# Patient Record
Sex: FEMALE | Race: BLACK OR AFRICAN AMERICAN | Employment: OTHER | URBAN - METROPOLITAN AREA
[De-identification: names, ages, dates, MRNs, and addresses within clinical notes are randomized per-mention and may not be internally consistent; named-entity substitution may affect disease eponyms.]

---

## 2017-01-04 ENCOUNTER — OFFICE VISIT (OUTPATIENT)
Dept: CARDIOLOGY CLINIC | Age: 82
End: 2017-01-04

## 2017-01-04 VITALS
SYSTOLIC BLOOD PRESSURE: 130 MMHG | OXYGEN SATURATION: 99 % | WEIGHT: 142 LBS | BODY MASS INDEX: 22.29 KG/M2 | DIASTOLIC BLOOD PRESSURE: 68 MMHG | HEIGHT: 67 IN | HEART RATE: 77 BPM

## 2017-01-04 DIAGNOSIS — R00.2 PALPITATIONS: ICD-10-CM

## 2017-01-04 DIAGNOSIS — I11.9 BENIGN HYPERTENSIVE HEART DISEASE WITHOUT HEART FAILURE: ICD-10-CM

## 2017-01-04 DIAGNOSIS — R53.83 FATIGUE, UNSPECIFIED TYPE: Primary | ICD-10-CM

## 2017-01-04 NOTE — MR AVS SNAPSHOT
Visit Information Date & Time Provider Department Dept. Phone Encounter #  
 1/4/2017  9:20 AM Jeane Naylor MD [facility] [address] [id number] Upcoming Health Maintenance Date Due DTaP/Tdap/Td series (1 - Tdap) 8/22/1949 ZOSTER VACCINE AGE 60> 8/22/1988 GLAUCOMA SCREENING Q2Y 8/22/1993 Pneumococcal 65+ Low/Medium Risk (1 of 2 - PCV13) 8/22/1993 MEDICARE YEARLY EXAM 8/22/1993 INFLUENZA AGE 9 TO ADULT 8/1/2016 Allergies as of 1/4/2017  Review Complete On: 1/4/2017 By: Jeane Naylor MD  
  
 Severity Noted Reaction Type Reactions Other Medication  04/11/2011    Other (comments) Allergy:  Most generic medication (dizziness, presyncope, equilibrium off, mental confusion/dazed feeling) Verapamil  04/11/2011    Other (comments) Symptomatic bradycardia and dizziness Zetia [Ezetimibe]  04/11/2011    Other (comments) Became weak and unable to walk, muscle aches Current Immunizations  Never Reviewed No immunizations on file. Not reviewed this visit You Were Diagnosed With   
  
 Codes Comments Palpitations    -  Primary ICD-10-CM: R00.2 ICD-9-CM: 785.1 Benign hypertensive heart disease without heart failure     ICD-10-CM: I11.9 ICD-9-CM: 402.10 Vitals BP Pulse Height(growth percentile) Weight(growth percentile) SpO2 BMI  
 130/68 77 5' 7\" (1.702 m) 142 lb (64.4 kg) 99% 22.24 kg/m2 Smoking Status Never Smoker Vitals History BMI and BSA Data Body Mass Index Body Surface Area  
 22.24 kg/m 2 1.74 m 2 Preferred Pharmacy Pharmacy Name Phone 100 [facility] [phone] Your Updated Medication List  
  
   
This list is accurate as of: 1/4/17 10:12 AM.  Always use your most recent med list.  
  
  
  
  
 ARTHROTEC 50  mg-mcg per tablet Generic drug:  diclofenac-miSOPROStol Take 1 Tab by mouth daily. aspirin 325 mg tablet Commonly known as:  ASPIRIN Take 325 mg by mouth daily. calcium-vitamin D 500 mg(1,250mg) -200 unit per tablet Commonly known as:  OYSTER SHELL Take 1 Tab by mouth two (2) times daily (with meals). metFORMIN 500 mg tablet Commonly known as:  GLUCOPHAGE Take 500 mg by mouth two (2) times daily (with meals). niacin  mg Tb24 Commonly known as:  Jammie Hashimoto Take  by mouth nightly. PROCARDIA XL 60 mg ER tablet Generic drug:  NIFEdipine ER  
TAKE 1 TABLET DAILY  
  
 triamterene-hydroCHLOROthiazide 37.5-25 mg per capsule Commonly known as:  [place] Shraddha[place] TAKE 1 CAPSULE DAILY Introducing [facility]! Marcie Huber introduces [employer] patient portal. Now you can access parts of your medical record, email your doctor's office, and request medication refills online. 1. In your internet browser, go to [email or web address] 2. Click on the First Time User? Click Here link in the Sign In box. You will see the New Member Sign Up page. 3. Enter your [employer] Access Code exactly as it appears below. You will not need to use this code after youve completed the sign-up process. If you do not sign up before the expiration date, you must request a new code. · [employer] Access Code: [email or web address] Expires: 1/29/2017  9:01 AM 
 
4. Enter the last four digits of your Social Security Number (xxxx) and Date of Birth (mm/dd/yyyy) as indicated and click Submit. You will be taken to the next sign-up page. 5. Create a [employer] ID. This will be your [employer] login ID and cannot be changed, so think of one that is secure and easy to remember. 6. Create a [employer] password. You can change your password at any time. 7. Enter your Password Reset Question and Answer. This can be used at a later time if you forget your password. 8. Enter your e-mail address.  You will receive e-mail notification when new information is available in [employer]. 9. Click Sign Up. You can now view and download portions of your medical record. 10. Click the Download Summary menu link to download a portable copy of your medical information. If you have questions, please visit the Frequently Asked Questions section of the [employer] website. Remember, [employer] is NOT to be used for urgent needs. For medical emergencies, dial 911. Now available from your iPhone and Android! Please provide this summary of care documentation to your next provider. Your primary care clinician is listed as Nilda Sosa. If you have any questions after today's visit, please call [phone].

## 2017-01-04 NOTE — PROGRESS NOTES
HISTORY OF PRESENT ILLNESS  Vannessa Douglas is a 80 y.o. female. HPI  She is complaining of fatigue. She has had this for a number of years. She denies chest pain, dyspnea, orthopnea or PND. She has had no palpitations, dizziness or syncope. She has had no symptoms to indicate TIA or amaurosis fugax. Her activity level has been above average. She saw my nurse practitioner at the end of October of 2016 with palpitations. At that time, she underwent a Holter monitor, which demonstrated sinus rhythm with occasional PVCs and also PACs. She had nine runs of supraventricular tachycardia  maximum eight beats in duration. She has since had no recurrent episodes of palpitations. This patient has a history of palpitations and hypertension. She was treated with verapamil in the past, which had to be discontinued because of symptomatic bradycardia with dizziness. Her Holter monitor revealed bradycardia with a heart rate as low as the upper 30s which has improved since verapamil has been discontinued. She has since been on Procardia with good blood pressure control. She has had no anginal chest pain and has a history of a negative stress test in the past. She had a great deal of myalgia from Zocor and finally stopped taking Zocor, with improvement. Review of Systems   Constitutional: Positive for malaise/fatigue. Negative for weight loss. HENT: Negative for hearing loss. Eyes: Negative for blurred vision and double vision. Respiratory: Negative for shortness of breath. Cardiovascular: Positive for palpitations. Negative for chest pain, orthopnea, claudication, leg swelling and PND. Gastrointestinal: Negative for blood in stool, heartburn and melena. Genitourinary: Negative for dysuria, frequency, hematuria and urgency. Musculoskeletal: Negative for back pain and joint pain. Skin: Negative for itching and rash.    Neurological: Negative for dizziness, loss of consciousness, weakness and headaches. Psychiatric/Behavioral: Negative for depression and memory loss. Physical Exam   Constitutional: She is oriented to person, place, and time. She appears well-developed and well-nourished. HENT:   Head: Normocephalic and atraumatic. Eyes: Conjunctivae are normal. Pupils are equal, round, and reactive to light. Neck: Normal range of motion. Neck supple. No JVD present. Cardiovascular: Normal rate, regular rhythm, S1 normal and S2 normal.   No extrasystoles are present. PMI is not displaced. Exam reveals no gallop and no friction rub. No murmur heard. Pulses:       Carotid pulses are 3+ on the right side, and 3+ on the left side. Pulmonary/Chest: Effort normal. She has no rales. Abdominal: Soft. There is no tenderness. Musculoskeletal: She exhibits no edema. Neurological: She is alert and oriented to person, place, and time. No cranial nerve deficit. Skin: Skin is warm and dry. Psychiatric: She has a normal mood and affect. Her behavior is normal.     Visit Vitals    /68    Pulse 77    Ht 5' 7\" (1.702 m)    Wt 64.4 kg (142 lb)    SpO2 99%    BMI 22.24 kg/m2       Past Medical History   Diagnosis Date    Cardiac echocardiogram 02/13/2007     [facility]:  EF 65-70%. No WMA. Mild LVH. No significant valve abnormalities.  Cardiac Holter monitor 11/10/2016     Sinus rhythm, avg 71 bpm (range 57-96 bpm). Occasional SVEs (mainly single, w/paired and 9 runs of SVT, max 8 beats).  History of bradycardia      with verapamil    Hypercholesteremia     Hypertension     Palpitations        Social History     Social History    Marital status:      Spouse name: N/A    Number of children: N/A    Years of education: N/A     Occupational History    Not on file.      Social History Main Topics    Smoking status: Never Smoker    Smokeless tobacco: Never Used    Alcohol use Yes      Comment: drinks alcohol in moderation    Drug use: No    Sexual activity: Not on file     Other Topics Concern    Not on file     Social History Narrative       Family History   Problem Relation Age of Onset    Hypertension Mother     Stroke Mother     Heart Disease Father        Past Surgical History   Procedure Laterality Date    Hx cataract removal  2001     bilateral       Current Outpatient Prescriptions   Medication Sig Dispense Refill    triamterene-hydrochlorothiazide (DYAZIDE) 37.5-25 mg per capsule TAKE 1 CAPSULE DAILY 90 Cap 3    PROCARDIA XL 60 mg ER tablet TAKE 1 TABLET DAILY 90 Tab 3    niacin ER (NIASPAN) 750 mg Tb24 Take  by mouth nightly.  calcium-vitamin D (OYSTER SHELL) 500 mg(1,250mg) -200 unit per tablet Take 1 Tab by mouth two (2) times daily (with meals).  metformin (GLUCOPHAGE) 500 mg tablet Take 500 mg by mouth two (2) times daily (with meals).  diclofenac-misoprostol (ARTHROTEC 50)  mg-mcg per tablet Take 1 Tab by mouth daily.  aspirin (ASPIRIN) 325 mg tablet Take 325 mg by mouth daily. EKG: unchanged from previous tracings, normal sinus rhythm, nonspecific ST and T waves changes. ASSESSMENT and PLAN  Encounter Diagnoses   Name Primary?  Fatigue, unspecified type Yes    Palpitations     Hypertension    Overall, she looks good for her age. Despite her fatigue, her activity level seems to be way above average for her age of [de-identified]. She shows no signs of cardiac decompensation. Her blood pressure has been under control. She has had no exertional chest discomfort or shortness of breath. She was advised that her fatigue may well be related to her advancing age yet her activity level seems to be way above average for her age. We discussed the atypical presentation of angina and we could consider a stress test, although, clinically, I doubt we are dealing with ischemic heart disease.   The patient would like to wait a while before proceeding with the stress test and I concurred with her with no reservation.

## 2017-01-04 NOTE — PROGRESS NOTES
1. Have you been to the ER, urgent care clinic since your last visit? Hospitalized since your last visit? No  2. Have you seen or consulted any other health care providers outside of the [address] [place] [address] since your last visit? Include any pap smears or colon screening.  No

## 2017-01-25 ENCOUNTER — HOSPITAL ENCOUNTER (OUTPATIENT)
Dept: LAB | Age: 82
Discharge: HOME | End: 2017-01-25
Payer: MEDICARE

## 2017-01-25 DIAGNOSIS — I10 ESSENTIAL HYPERTENSION, MALIGNANT: ICD-10-CM

## 2017-01-25 DIAGNOSIS — M13.0 UNSPECIFIED POLYARTHROPATHY OR POLYARTHRITIS, SITE UNSPECIFIED: ICD-10-CM

## 2017-01-25 LAB
ALBUMIN SERPL BCP-MCNC: 4.2 G/DL (ref 3.4–5)
ALBUMIN/GLOB SERPL: 1.2 {RATIO} (ref 0.8–1.7)
ALP SERPL-CCNC: 81 U/L (ref 45–117)
ALT SERPL-CCNC: 20 U/L (ref 13–56)
ANION GAP BLD CALC-SCNC: 10 MMOL/L (ref 3–18)
APPEARANCE UR: CLEAR
AST SERPL W P-5'-P-CCNC: 16 U/L (ref 15–37)
BACTERIA URNS QL MICRO: ABNORMAL /HPF
BASOPHILS # BLD AUTO: 0.1 K/UL (ref 0–0.1)
BASOPHILS # BLD: 2 % (ref 0–2)
BILIRUB SERPL-MCNC: 0.2 MG/DL (ref 0.2–1)
BILIRUB UR QL: NEGATIVE
BUN SERPL-MCNC: 19 MG/DL (ref 7–18)
BUN/CREAT SERPL: 17 (ref 12–20)
CALCIUM SERPL-MCNC: 9.4 MG/DL (ref 8.5–10.1)
CHLORIDE SERPL-SCNC: 103 MMOL/L (ref 100–108)
CK SERPL-CCNC: 146 U/L (ref 26–192)
CO2 SERPL-SCNC: 26 MMOL/L (ref 21–32)
COLOR UR: YELLOW
CREAT SERPL-MCNC: 1.13 MG/DL (ref 0.6–1.3)
CRP SERPL-MCNC: <0.3 MG/DL (ref 0–0.3)
DIFFERENTIAL METHOD BLD: ABNORMAL
EOSINOPHIL # BLD: 0.1 K/UL (ref 0–0.4)
EOSINOPHIL NFR BLD: 2 % (ref 0–5)
EPITH CASTS URNS QL MICRO: ABNORMAL /LPF (ref 0–5)
ERYTHROCYTE [DISTWIDTH] IN BLOOD BY AUTOMATED COUNT: 18.4 % (ref 11.6–14.5)
ERYTHROCYTE [SEDIMENTATION RATE] IN BLOOD: 33 MM/HR (ref 0–30)
GLOBULIN SER CALC-MCNC: 3.6 G/DL (ref 2–4)
GLUCOSE SERPL-MCNC: 117 MG/DL (ref 74–99)
GLUCOSE UR STRIP.AUTO-MCNC: NEGATIVE MG/DL
HCT VFR BLD AUTO: 32.5 % (ref 35–45)
HGB BLD-MCNC: 10.7 G/DL (ref 12–16)
HGB UR QL STRIP: NEGATIVE
KETONES UR QL STRIP.AUTO: NEGATIVE MG/DL
LEUKOCYTE ESTERASE UR QL STRIP.AUTO: ABNORMAL
LYMPHOCYTES # BLD AUTO: 43 % (ref 21–52)
LYMPHOCYTES # BLD: 2.7 K/UL (ref 0.9–3.6)
MCH RBC QN AUTO: 28.8 PG (ref 24–34)
MCHC RBC AUTO-ENTMCNC: 32.9 G/DL (ref 31–37)
MCV RBC AUTO: 87.4 FL (ref 74–97)
MONOCYTES # BLD: 0.5 K/UL (ref 0.05–1.2)
MONOCYTES NFR BLD AUTO: 8 % (ref 3–10)
NEUTS SEG # BLD: 2.9 K/UL (ref 1.8–8)
NEUTS SEG NFR BLD AUTO: 45 % (ref 40–73)
NITRITE UR QL STRIP.AUTO: NEGATIVE
PH UR STRIP: 6 [PH] (ref 5–8)
PLATELET # BLD AUTO: 359 K/UL (ref 135–420)
PMV BLD AUTO: 10.3 FL (ref 9.2–11.8)
POTASSIUM SERPL-SCNC: 4.5 MMOL/L (ref 3.5–5.5)
PROT SERPL-MCNC: 7.8 G/DL (ref 6.4–8.2)
PROT UR STRIP-MCNC: NEGATIVE MG/DL
RBC # BLD AUTO: 3.72 M/UL (ref 4.2–5.3)
RBC #/AREA URNS HPF: ABNORMAL /HPF (ref 0–5)
SODIUM SERPL-SCNC: 139 MMOL/L (ref 136–145)
SP GR UR REFRACTOMETRY: 1.02 (ref 1–1.03)
UROBILINOGEN UR QL STRIP.AUTO: 0.2 EU/DL (ref 0.2–1)
WBC # BLD AUTO: 6.3 K/UL (ref 4.6–13.2)
WBC URNS QL MICRO: ABNORMAL /HPF (ref 0–4)

## 2017-01-25 PROCEDURE — 85652 RBC SED RATE AUTOMATED: CPT | Performed by: SPECIALIST

## 2017-01-25 PROCEDURE — 80053 COMPREHEN METABOLIC PANEL: CPT | Performed by: SPECIALIST

## 2017-01-25 PROCEDURE — 81001 URINALYSIS AUTO W/SCOPE: CPT | Performed by: SPECIALIST

## 2017-01-25 PROCEDURE — 86140 C-REACTIVE PROTEIN: CPT | Performed by: SPECIALIST

## 2017-01-25 PROCEDURE — 36415 COLL VENOUS BLD VENIPUNCTURE: CPT | Performed by: SPECIALIST

## 2017-01-25 PROCEDURE — 82550 ASSAY OF CK (CPK): CPT | Performed by: SPECIALIST

## 2017-01-25 PROCEDURE — 85025 COMPLETE CBC W/AUTO DIFF WBC: CPT | Performed by: SPECIALIST

## 2017-02-06 RX ORDER — NIFEDIPINE 60 MG
TABLET, EXTENDED RELEASE 24 HR ORAL
Qty: 90 TAB | Refills: 2 | Status: SHIPPED | OUTPATIENT
Start: 2017-02-06 | End: 2017-11-07 | Stop reason: SDUPTHER

## 2017-03-02 RX ORDER — TRIAMTERENE AND HYDROCHLOROTHIAZIDE 37.5; 25 MG/1; MG/1
CAPSULE ORAL
Qty: 90 CAP | Refills: 3 | Status: SHIPPED | OUTPATIENT
Start: 2017-03-02 | End: 2018-02-27 | Stop reason: SDUPTHER

## 2017-07-11 ENCOUNTER — OFFICE VISIT (OUTPATIENT)
Dept: CARDIOLOGY CLINIC | Age: 82
End: 2017-07-11

## 2017-07-11 VITALS
HEIGHT: 67 IN | HEART RATE: 69 BPM | OXYGEN SATURATION: 98 % | DIASTOLIC BLOOD PRESSURE: 60 MMHG | WEIGHT: 142 LBS | SYSTOLIC BLOOD PRESSURE: 140 MMHG | BODY MASS INDEX: 22.29 KG/M2

## 2017-07-11 DIAGNOSIS — R00.2 PALPITATIONS: ICD-10-CM

## 2017-07-11 DIAGNOSIS — R53.83 FATIGUE, UNSPECIFIED TYPE: Primary | ICD-10-CM

## 2017-07-11 DIAGNOSIS — I11.9 BENIGN HYPERTENSIVE HEART DISEASE WITHOUT HEART FAILURE: ICD-10-CM

## 2017-07-11 NOTE — PROGRESS NOTES
1. Have you been to the ER, urgent care clinic since your last visit? Hospitalized since your last visit? No  2. Have you seen or consulted any other health care providers outside of the [facility] since your last visit? Include any pap smears or colon screening.  no

## 2017-07-11 NOTE — PATIENT INSTRUCTIONS
Continue current medications. No changes.    If you have any further questions or concerns, please contact our office. [phone]

## 2017-07-11 NOTE — PROGRESS NOTES
HISTORY OF PRESENT ILLNESS  Connor Walker is a 80 y.o. female. HPI  She is still complaining of easy fatigability. However, she seems to be reasonably active for her age. She is able to do a lot of household chores at home. She denies any cardiac symptoms. She denies chest pain, dyspnea, orthopnea or PND. She denies palpitations, dizziness or syncope. She used to have palpitations, but she is no longer experiencing palpitations. She denies any symptoms to indicate TIA or amaurosis fugax. She has a new family doctor and she apparently discontinued the metformin because of adequate hemoglobin A1Cs. This patient has a history of palpitations and hypertension. She was treated with verapamil in the past, which had to be discontinued because of symptomatic bradycardia with dizziness. Her Holter monitor revealed bradycardia with a heart rate as low as the upper 30s which has improved since verapamil has been discontinued. She has since been on Procardia with good blood pressure control. She has had no anginal chest pain and has a history of a negative stress test in the past. She had a great deal of myalgia from Zocor and finally stopped taking Zocor, with improvement. She saw my nurse practitioner at the end of October of 2016 with palpitations. At that time, she underwent a Holter monitor, which demonstrated sinus rhythm with occasional PVCs and also PACs. She had nine runs of supraventricular tachycardia  maximum eight beats in duration. She has since had no recurrent episodes of palpitations. Review of Systems   Constitutional: Positive for malaise/fatigue. Negative for weight loss. HENT: Negative for hearing loss. Eyes: Negative for blurred vision and double vision. Respiratory: Negative for shortness of breath. Cardiovascular: Negative for chest pain, palpitations, orthopnea, claudication, leg swelling and PND.    Gastrointestinal: Negative for blood in stool, heartburn and melena. Genitourinary: Negative for dysuria, frequency, hematuria and urgency. Musculoskeletal: Negative for back pain and joint pain. Skin: Negative for itching and rash. Neurological: Negative for dizziness, loss of consciousness, weakness and headaches. Psychiatric/Behavioral: Negative for depression and memory loss. Physical Exam   Constitutional: She is oriented to person, place, and time. She appears well-developed and well-nourished. HENT:   Head: Normocephalic and atraumatic. Eyes: Conjunctivae are normal. Pupils are equal, round, and reactive to light. Neck: Normal range of motion. Neck supple. No JVD present. Cardiovascular: Normal rate, regular rhythm, S1 normal and S2 normal.   No extrasystoles are present. PMI is not displaced. Exam reveals no gallop and no friction rub. No murmur heard. Pulses:       Carotid pulses are 3+ on the right side, and 3+ on the left side. Pulmonary/Chest: Effort normal. She has no rales. Abdominal: Soft. There is no tenderness. Musculoskeletal: She exhibits no edema. Neurological: She is alert and oriented to person, place, and time. No cranial nerve deficit. Skin: Skin is warm and dry. Psychiatric: She has a normal mood and affect. Her behavior is normal.     Visit Vitals    /60 (BP 1 Location: Left arm, BP Patient Position: Sitting)    Pulse 69    Ht 5' 7\" (1.702 m)    Wt 64.4 kg (142 lb)    SpO2 98%    BMI 22.24 kg/m2       Past Medical History:   Diagnosis Date    Cardiac echocardiogram 02/13/2007    [facility]:  EF 65-70%. No WMA. Mild LVH. No significant valve abnormalities.  Cardiac Holter monitor 11/10/2016    Sinus rhythm, avg 71 bpm (range 57-96 bpm). Occasional SVEs (mainly single, w/paired and 9 runs of SVT, max 8 beats).       History of bradycardia     with verapamil    Hypercholesteremia     Hypertension     Palpitations        Social History     Social History    Marital status:  Spouse name: N/A    Number of children: N/A    Years of education: N/A     Occupational History    Not on file. Social History Main Topics    Smoking status: Never Smoker    Smokeless tobacco: Never Used    Alcohol use Yes      Comment: drinks alcohol in moderation    Drug use: No    Sexual activity: Not on file     Other Topics Concern    Not on file     Social History Narrative       Family History   Problem Relation Age of Onset    Hypertension Mother     Stroke Mother     Heart Disease Father        Past Surgical History:   Procedure Laterality Date    HX CATARACT REMOVAL  2001    bilateral       Current Outpatient Prescriptions   Medication Sig Dispense Refill    triamterene-hydroCHLOROthiazide (DYAZIDE) 37.5-25 mg per capsule TAKE 1 CAPSULE DAILY 90 Cap 3    PROCARDIA XL 60 mg ER tablet TAKE 1 TABLET DAILY 90 Tab 2    niacin ER (NIASPAN) 750 mg Tb24 Take  by mouth nightly.  calcium-vitamin D (OYSTER SHELL) 500 mg(1,250mg) -200 unit per tablet Take 1 Tab by mouth two (2) times daily (with meals).  diclofenac-misoprostol (ARTHROTEC 50)  mg-mcg per tablet Take 1 Tab by mouth daily.  aspirin (ASPIRIN) 325 mg tablet Take 325 mg by mouth daily. EKG: unchanged from previous tracings, normal sinus rhythm, nonspecific ST and T waves changes, sinus arrhythmia vs PAC's  . ASSESSMENT and PLAN  Encounter Diagnoses   Name Primary?  Fatigue, unspecified type Yes    Palpitations     Hypertension    She has been doing quite well. She continues with fatigue; however, her activity level appears to be way above average for her age of [de-identified]. She has no cardiac findings to account for her fatigue. Her blood pressure has been under control. She is no longer experiencing palpitations. She did have some PVCs and PACs and short runs of supraventricular tachycardia in October of 2016. The metformin has been discontinued because of good hemoglobin A1Cs.   Her glucose control remains to be seen. I am in favor of continuing the metformin at this time.

## 2017-07-11 NOTE — MR AVS SNAPSHOT
Visit Information Date & Time Provider Department Dept. Phone Encounter #  
 7/11/2017 10:20 AM Nessa Hicks MD [facility] [phone] [id number] Upcoming Health Maintenance Date Due DTaP/Tdap/Td series (1 - Tdap) 8/22/1949 ZOSTER VACCINE AGE 60> 8/22/1988 GLAUCOMA SCREENING Q2Y 8/22/1993 Pneumococcal 65+ Low/Medium Risk (1 of 2 - PCV13) 8/22/1993 MEDICARE YEARLY EXAM 8/22/1993 INFLUENZA AGE 9 TO ADULT 8/1/2017 Allergies as of 7/11/2017  Review Complete On: 7/11/2017 By: Nessa Hicks MD  
  
 Severity Noted Reaction Type Reactions Other Medication  04/11/2011    Other (comments) Allergy:  Most generic medication (dizziness, presyncope, equilibrium off, mental confusion/dazed feeling) Verapamil  04/11/2011    Other (comments) Symptomatic bradycardia and dizziness Zetia [Ezetimibe]  04/11/2011    Other (comments) Became weak and unable to walk, muscle aches Current Immunizations  Never Reviewed No immunizations on file. Not reviewed this visit You Were Diagnosed With   
  
 Codes Comments Fatigue, unspecified type    -  Primary ICD-10-CM: R53.83 ICD-9-CM: 780.79 Palpitations     ICD-10-CM: R00.2 ICD-9-CM: 785.1 Benign hypertensive heart disease without heart failure     ICD-10-CM: I11.9 ICD-9-CM: 402.10 Vitals BP Pulse Height(growth percentile) Weight(growth percentile) SpO2 BMI  
 140/60 (BP 1 Location: Left arm, BP Patient Position: Sitting) 69 5' 7\" (1.702 m) 142 lb (64.4 kg) 98% 22.24 kg/m2 Smoking Status Never Smoker Vitals History BMI and BSA Data Body Mass Index Body Surface Area  
 22.24 kg/m 2 1.74 m 2 Preferred Pharmacy Pharmacy Name Phone 100 [facility] [phone] Your Updated Medication List  
  
   
 This list is accurate as of: 7/11/17 11:00 AM.  Always use your most recent med list.  
  
  
  
  
 ARTHROTEC 50  mg-mcg per tablet Generic drug:  diclofenac-miSOPROStol Take 1 Tab by mouth daily. aspirin 325 mg tablet Commonly known as:  ASPIRIN Take 325 mg by mouth daily. calcium-vitamin D 500 mg(1,250mg) -200 unit per tablet Commonly known as:  OYSTER SHELL Take 1 Tab by mouth two (2) times daily (with meals). niacin  mg Tb24 Commonly known as:  Llana Doris Take  by mouth nightly. PROCARDIA XL 60 mg ER tablet Generic drug:  NIFEdipine ER  
TAKE 1 TABLET DAILY  
  
 triamterene-hydroCHLOROthiazide 37.5-25 mg per capsule Commonly known as:  Phelan Shoemaker TAKE 1 CAPSULE DAILY We Performed the Following AMB POC EKG ROUTINE W/ 12 LEADS, INTER & REP [78540 CPT(R)] Please provide this summary of care documentation to your next provider. Your primary care clinician is listed as Meme Medellin. If you have any questions after today's visit, please call [phone].

## 2017-11-07 RX ORDER — NIFEDIPINE 60 MG
60 TABLET, EXTENDED RELEASE 24 HR ORAL DAILY
Qty: 90 TAB | Refills: 3 | Status: SHIPPED | OUTPATIENT
Start: 2017-11-07 | End: 2018-11-08 | Stop reason: SDUPTHER

## 2018-01-15 ENCOUNTER — OFFICE VISIT (OUTPATIENT)
Dept: CARDIOLOGY CLINIC | Age: 83
End: 2018-01-15

## 2018-01-15 VITALS
SYSTOLIC BLOOD PRESSURE: 138 MMHG | HEIGHT: 67 IN | WEIGHT: 150 LBS | BODY MASS INDEX: 23.54 KG/M2 | HEART RATE: 59 BPM | DIASTOLIC BLOOD PRESSURE: 70 MMHG | OXYGEN SATURATION: 94 %

## 2018-01-15 DIAGNOSIS — I11.9 BENIGN HYPERTENSIVE HEART DISEASE WITHOUT HEART FAILURE: ICD-10-CM

## 2018-01-15 DIAGNOSIS — R00.2 PALPITATIONS: Primary | ICD-10-CM

## 2018-01-15 DIAGNOSIS — Z78.9 INTOLERANCE OF DRUG: ICD-10-CM

## 2018-01-15 RX ORDER — NIACIN 750 MG/1
TABLET, EXTENDED RELEASE ORAL
COMMUNITY
End: 2019-01-28 | Stop reason: ALTCHOICE

## 2018-01-15 RX ORDER — METFORMIN HYDROCHLORIDE 500 MG/1
TABLET ORAL 2 TIMES DAILY WITH MEALS
COMMUNITY
End: 2018-01-15

## 2018-01-15 NOTE — PROGRESS NOTES
1. Have you been to the ER, urgent care clinic since your last visit? Hospitalized since your last visit? No    2. Have you seen or consulted any other health care providers outside of the [facility] since your last visit? Include any pap smears or colon screening.  no

## 2018-01-15 NOTE — MR AVS SNAPSHOT
Visit Information Date & Time Provider Department Dept. Phone Encounter #  
 1/15/2018  9:40 AM Sravan Davis MD [facility] [phone] [id number] Upcoming Health Maintenance Date Due DTaP/Tdap/Td series (1 - Tdap) 8/22/1949 ZOSTER VACCINE AGE 60> 6/22/1988 GLAUCOMA SCREENING Q2Y 8/22/1993 Pneumococcal 65+ Low/Medium Risk (1 of 2 - PCV13) 8/22/1993 MEDICARE YEARLY EXAM 8/22/1993 Influenza Age 5 to Adult 8/1/2017 Allergies as of 1/15/2018  Review Complete On: 1/15/2018 By: Sravan Davis MD  
  
 Severity Noted Reaction Type Reactions Other Medication  04/11/2011    Other (comments) Allergy:  Most generic medication (dizziness, presyncope, equilibrium off, mental confusion/dazed feeling) Verapamil  04/11/2011    Other (comments) Symptomatic bradycardia and dizziness Zetia [Ezetimibe]  04/11/2011    Other (comments) Became weak and unable to walk, muscle aches Current Immunizations  Never Reviewed No immunizations on file. Not reviewed this visit You Were Diagnosed With   
  
 Codes Comments Other fatigue    -  Primary ICD-10-CM: R53.83 ICD-9-CM: 780.79 Palpitations     ICD-10-CM: R00.2 ICD-9-CM: 785.1 Benign hypertensive heart disease without heart failure     ICD-10-CM: I11.9 ICD-9-CM: 402.10 Intolerance of drug     ICD-10-CM: Z78.9 ICD-9-CM: 995.27 Vitals BP Pulse Height(growth percentile) Weight(growth percentile) SpO2 BMI  
 138/70 (!) 59 5' 7\" (1.702 m) 150 lb (68 kg) 94% 23.49 kg/m2 Smoking Status Never Smoker Vitals History BMI and BSA Data Body Mass Index Body Surface Area  
 23.49 kg/m 2 1.79 m 2 Preferred Pharmacy Pharmacy Name Phone 100 [facility] [phone] Your Updated Medication List  
  
   
 This list is accurate as of: 1/15/18 10:33 AM.  Always use your most recent med list.  
  
  
  
  
 aspirin 325 mg tablet Commonly known as:  ASPIRIN Take 325 mg by mouth daily. niacin  mg Tb24 Commonly known as:  Sinda Poll Take  by mouth nightly. PROCARDIA XL 60 mg ER tablet Generic drug:  NIFEdipine ER Take 1 Tab by mouth daily. triamterene-hydroCHLOROthiazide 37.5-25 mg per capsule Commonly known as:  Kala Stephanie TAKE 1 CAPSULE DAILY We Performed the Following AMB POC EKG ROUTINE W/ 12 LEADS, INTER & REP [56717 CPT(R)] Please provide this summary of care documentation to your next provider. Your primary care clinician is listed as Chalino Hsu. If you have any questions after today's visit, please call [phone].

## 2018-01-15 NOTE — PROGRESS NOTES
HISTORY OF PRESENT ILLNESS  Connor Ureña is a 80 y.o. female. HPI  She has been feeling well. She denies any cardiac symptoms. She has had no chest pain, dyspnea, orthopnea or PND. She denies palpitations, dizziness or syncope. She has had no symptoms to indicate TIA or amaurosis fugax. Her blood pressure has been under control. She has been off metformin altogether as her diabetes has been under control. This patient has a history of palpitations and hypertension. She was treated with verapamil in the past, which had to be discontinued because of symptomatic bradycardia with dizziness. Her Holter monitor revealed bradycardia with a heart rate as low as the upper 30s which has improved since verapamil has been discontinued. She has since been on Procardia with good blood pressure control. She has had no anginal chest pain and has a history of a negative stress test in the past. She had a great deal of myalgia from Zocor and finally stopped taking Zocor, with improvement. She saw my nurse practitioner at the end of October of 2016 with palpitations.  At that time, she underwent a Holter monitor, which demonstrated sinus rhythm with occasional PVCs and also PACs.  She had nine runs of supraventricular tachycardia  maximum eight beats in duration.  She has since had no recurrent episodes of palpitations. Review of Systems   Constitutional: Negative for malaise/fatigue and weight loss. HENT: Negative for hearing loss. Eyes: Negative for blurred vision and double vision. Respiratory: Negative for shortness of breath. Cardiovascular: Negative for chest pain, palpitations, orthopnea, claudication, leg swelling and PND. Gastrointestinal: Negative for blood in stool, heartburn and melena. Genitourinary: Negative for dysuria, frequency, hematuria and urgency. Musculoskeletal: Negative for back pain and joint pain. Skin: Negative for itching and rash.    Neurological: Negative for dizziness, loss of consciousness and weakness. Psychiatric/Behavioral: Negative for depression and memory loss. Physical Exam   Constitutional: She is oriented to person, place, and time. She appears well-developed and well-nourished. HENT:   Head: Normocephalic and atraumatic. Eyes: Conjunctivae are normal. Pupils are equal, round, and reactive to light. Neck: Normal range of motion. Neck supple. No JVD present. Cardiovascular: Normal rate, regular rhythm, S1 normal and S2 normal.   No extrasystoles are present. PMI is not displaced. Exam reveals no gallop and no friction rub. No murmur heard. Pulses:       Carotid pulses are 3+ on the right side, and 3+ on the left side. Pulmonary/Chest: Effort normal. She has no rales. Abdominal: Soft. There is no tenderness. Musculoskeletal: She exhibits no edema. Neurological: She is alert and oriented to person, place, and time. No cranial nerve deficit. Skin: Skin is warm and dry. Psychiatric: She has a normal mood and affect. Her behavior is normal.     Visit Vitals    /70    Pulse (!) 59    Ht 5' 7\" (1.702 m)    Wt 68 kg (150 lb)    SpO2 94%    BMI 23.49 kg/m2       Past Medical History:   Diagnosis Date    Cardiac echocardiogram 02/13/2007    [facility]:  EF 65-70%. No WMA. Mild LVH. No significant valve abnormalities.  Cardiac Holter monitor 11/10/2016    Sinus rhythm, avg 71 bpm (range 57-96 bpm). Occasional SVEs (mainly single, w/paired and 9 runs of SVT, max 8 beats).  History of bradycardia     with verapamil    Hypercholesteremia     Hypertension     Palpitations        Social History     Social History    Marital status:      Spouse name: N/A    Number of children: N/A    Years of education: N/A     Occupational History    Not on file.      Social History Main Topics    Smoking status: Never Smoker    Smokeless tobacco: Never Used    Alcohol use Yes      Comment: drinks alcohol in moderation  Drug use: No    Sexual activity: Not on file     Other Topics Concern    Not on file     Social History Narrative       Family History   Problem Relation Age of Onset    Hypertension Mother     Stroke Mother     Heart Disease Father        Past Surgical History:   Procedure Laterality Date    HX CATARACT REMOVAL  2001    bilateral       Current Outpatient Prescriptions   Medication Sig Dispense Refill    niacin ER (NIASPAN) 750 mg Tb24 Take  by mouth nightly.  PROCARDIA XL 60 mg ER tablet Take 1 Tab by mouth daily. 90 Tab 3    triamterene-hydroCHLOROthiazide (DYAZIDE) 37.5-25 mg per capsule TAKE 1 CAPSULE DAILY 90 Cap 3    aspirin (ASPIRIN) 325 mg tablet Take 325 mg by mouth daily. EKG: unchanged from previous tracings, normal sinus rhythm, nonspecific ST and T waves changes, sinus arrhythmia  . ASSESSMENT and PLAN  Encounter Diagnoses   Name Primary?  Palpitations Yes    Hypertension     Intolerance to statin because of myalgia. She has been doing very well. She is no longer experiencing palpitations. She has had no symptoms to indicate angina or cardiac decompensation. Her blood pressure has been under control. She looks good for her age of 80. At this point, I would not pursue further cardiac diagnostic workup.

## 2018-02-27 RX ORDER — TRIAMTERENE AND HYDROCHLOROTHIAZIDE 37.5; 25 MG/1; MG/1
CAPSULE ORAL
Qty: 90 CAP | Refills: 3 | Status: SHIPPED | OUTPATIENT
Start: 2018-02-27 | End: 2019-02-22 | Stop reason: SDUPTHER

## 2018-07-24 ENCOUNTER — OFFICE VISIT (OUTPATIENT)
Dept: CARDIOLOGY CLINIC | Age: 83
End: 2018-07-24

## 2018-07-24 VITALS
HEART RATE: 75 BPM | OXYGEN SATURATION: 98 % | WEIGHT: 147 LBS | HEIGHT: 67 IN | SYSTOLIC BLOOD PRESSURE: 112 MMHG | BODY MASS INDEX: 23.07 KG/M2 | DIASTOLIC BLOOD PRESSURE: 60 MMHG

## 2018-07-24 DIAGNOSIS — R00.2 PALPITATIONS: Primary | ICD-10-CM

## 2018-07-24 DIAGNOSIS — Z78.9 INTOLERANCE OF DRUG: ICD-10-CM

## 2018-07-24 DIAGNOSIS — I11.9 BENIGN HYPERTENSIVE HEART DISEASE WITHOUT HEART FAILURE: ICD-10-CM

## 2018-07-24 NOTE — MR AVS SNAPSHOT
[place] [id number] [place] [id number] [phone] Patient: Jareth Navarrete MRN: [id number] IPP:[id number] Visit Information Date & Time Provider Department Dept. Phone Encounter #  
 7/24/2018  9:20 AM Ezekiel Benz MD [facility] [id number] Follow-up Instructions Follow-up and Disposition History Upcoming Health Maintenance Date Due DTaP/Tdap/Td series (1 - Tdap) 8/22/1949 ZOSTER VACCINE AGE 60> 6/22/1988 GLAUCOMA SCREENING Q2Y 8/22/1993 Pneumococcal 65+ Low/Medium Risk (1 of 2 - PCV13) 8/22/1993 MEDICARE YEARLY EXAM 3/14/2018 Influenza Age 5 to Adult 8/1/2018 Allergies as of 7/24/2018  Review Complete On: 7/24/2018 By: Ezekiel Benz MD  
  
 Severity Noted Reaction Type Reactions Other Medication  04/11/2011    Other (comments) Allergy:  Most generic medication (dizziness, presyncope, equilibrium off, mental confusion/dazed feeling) Verapamil  04/11/2011    Other (comments) Symptomatic bradycardia and dizziness Zetia [Ezetimibe]  04/11/2011    Other (comments) Became weak and unable to walk, muscle aches Current Immunizations  Never Reviewed No immunizations on file. Not reviewed this visit You Were Diagnosed With   
  
 Codes Comments Palpitations    -  Primary ICD-10-CM: R00.2 ICD-9-CM: 785.1 Benign hypertensive heart disease without heart failure     ICD-10-CM: I11.9 ICD-9-CM: 402.10 Intolerance of drug     ICD-10-CM: Z78.9 ICD-9-CM: 995.27 Vitals BP Pulse Height(growth percentile) Weight(growth percentile) SpO2 BMI  
 112/60 75 5' 7\" (1.702 m) 147 lb (66.7 kg) 98% 23.02 kg/m2 Smoking Status Never Smoker Vitals History BMI and BSA Data Body Mass Index Body Surface Area 23.02 kg/m 2 1.78 m 2 Preferred Pharmacy Pharmacy Name Phone [facility], [facility] [phone] Your Updated Medication List  
  
   
This list is accurate as of 7/24/18 10:05 AM.  Always use your most recent med list.  
  
  
  
  
 aspirin 325 mg tablet Commonly known as:  ASPIRIN Take 325 mg by mouth daily. niacin  mg Tb24 Commonly known as:  Lajune Drape Take  by mouth nightly. PROCARDIA XL 60 mg ER tablet Generic drug:  NIFEdipine ER Take 1 Tab by mouth daily. triamterene-hydroCHLOROthiazide 37.5-25 mg per capsule Commonly known as:  Lavenia Stoddard TAKE 1 CAPSULE DAILY We Performed the Following AMB POC EKG ROUTINE W/ 12 LEADS, INTER & REP [35791 CPT(R)] Introducing [facility]! [facility] introduces [employer] patient portal. Now you can access parts of your medical record, email your doctor's office, and request medication refills online. 1. In your internet browser, go to [email or web address] 2. Click on the First Time User? Click Here link in the Sign In box. You will see the New Member Sign Up page. 3. Enter your [employer] Access Code exactly as it appears below. You will not need to use this code after youve completed the sign-up process. If you do not sign up before the expiration date, you must request a new code. · [employer] Access Code: [email or web address] Expires: 10/22/2018  8:45 AM 
 
4. Enter the last four digits of your Social Security Number (xxxx) and Date of Birth (mm/dd/yyyy) as indicated and click Submit. You will be taken to the next sign-up page. 5. Create a [employer] ID. This will be your [employer] login ID and cannot be changed, so think of one that is secure and easy to remember. 6. Create a [employer] password. You can change your password at any time. 7. Enter your Password Reset Question and Answer. This can be used at a later time if you forget your password. 8. Enter your e-mail address. You will receive e-mail notification when new information is available in [address] 9. Click Sign Up. You can now view and download portions of your medical record. 10. Click the Download Summary menu link to download a portable copy of your medical information. If you have questions, please visit the Frequently Asked Questions section of the [employer] website. Remember, [employer] is NOT to be used for urgent needs. For medical emergencies, dial 911. Now available from your iPhone and Android! Please provide this summary of care documentation to your next provider. Your primary care clinician is listed as Rocky [place]. If you have any questions after today's visit, please call [phone].

## 2018-07-24 NOTE — PROGRESS NOTES
HISTORY OF PRESENT ILLNESS  Connor Sepulveda is a 80 y.o. female. HPI  She has been feeling well. She denies any cardiac symptoms. She has had no recurrent palpitation. She denies chest pain, dyspnea, orthopnea, PND. She denies dizziness or syncope. She denies any symptoms of TIA or amaurosis fugax. She remains active for her age of 80 going on 80. This patient has a history of palpitations and hypertension. She was treated with verapamil in the past, which had to be discontinued because of symptomatic bradycardia with dizziness. Her Holter monitor revealed bradycardia with a heart rate as low as the upper 30s which has improved since verapamil has been discontinued. She has since been on Procardia with good blood pressure control. She has had no anginal chest pain and has a history of a negative stress test in the past. She had a great deal of myalgia from Zocor and finally stopped taking Zocor, with improvement. She saw my nurse practitioner at the end of October of 2016 with palpitations.  At that time, she underwent a Holter monitor, which demonstrated sinus rhythm with occasional PVCs and also PACs.  She had nine runs of supraventricular tachycardia  maximum eight beats in duration.  She has since had no recurrent episodes of palpitations. Review of Systems   Constitutional: Negative for malaise/fatigue and weight loss. HENT: Negative for hearing loss. Eyes: Negative for blurred vision and double vision. Respiratory: Negative for shortness of breath. Cardiovascular: Negative for chest pain, palpitations, orthopnea, claudication, leg swelling and PND. Gastrointestinal: Negative for blood in stool, heartburn and melena. Genitourinary: Negative for dysuria, frequency, hematuria and urgency. Musculoskeletal: Negative for back pain and joint pain. Skin: Negative for itching and rash. Neurological: Negative for dizziness, loss of consciousness and weakness.    Psychiatric/Behavioral: Negative for depression and memory loss. Physical Exam   Constitutional: She is oriented to person, place, and time. She appears well-developed and well-nourished. HENT:   Head: Normocephalic and atraumatic. Eyes: Conjunctivae are normal. Pupils are equal, round, and reactive to light. Neck: Normal range of motion. Neck supple. No JVD present. Cardiovascular: Normal rate, regular rhythm, S1 normal and S2 normal.   No extrasystoles are present. PMI is not displaced. Exam reveals no gallop and no friction rub. No murmur heard. Pulses:       Carotid pulses are 3+ on the right side, and 3+ on the left side. Pulmonary/Chest: Effort normal. She has no rales. Abdominal: Soft. There is no tenderness. Musculoskeletal: She exhibits no edema. Neurological: She is alert and oriented to person, place, and time. No cranial nerve deficit. Skin: Skin is warm and dry. Psychiatric: She has a normal mood and affect. Her behavior is normal.     Visit Vitals    /60    Pulse 75    Ht 5' 7\" (1.702 m)    Wt 66.7 kg (147 lb)    SpO2 98%    BMI 23.02 kg/m2       Past Medical History:   Diagnosis Date    Cardiac echocardiogram 02/13/2007    [facility]:  EF 65-70%. No WMA. Mild LVH. No significant valve abnormalities.  Cardiac Holter monitor 11/10/2016    Sinus rhythm, avg 71 bpm (range 57-96 bpm). Occasional SVEs (mainly single, w/paired and 9 runs of SVT, max 8 beats).  History of bradycardia     with verapamil    Hypercholesteremia     Hypertension     Palpitations        Social History     Social History    Marital status:      Spouse name: N/A    Number of children: N/A    Years of education: N/A     Occupational History    Not on file.      Social History Main Topics    Smoking status: Never Smoker    Smokeless tobacco: Never Used    Alcohol use Yes      Comment: drinks alcohol in moderation    Drug use: No    Sexual activity: Not on file     Other Topics Concern  Not on file     Social History Narrative       Family History   Problem Relation Age of Onset    Hypertension Mother     Stroke Mother     Heart Disease Father        Past Surgical History:   Procedure Laterality Date    HX CATARACT REMOVAL  2001    bilateral       Current Outpatient Prescriptions   Medication Sig Dispense Refill    triamterene-hydroCHLOROthiazide (DYAZIDE) 37.5-25 mg per capsule TAKE 1 CAPSULE DAILY 90 Cap 3    niacin ER (NIASPAN) 750 mg Tb24 Take  by mouth nightly.  PROCARDIA XL 60 mg ER tablet Take 1 Tab by mouth daily. 90 Tab 3    aspirin (ASPIRIN) 325 mg tablet Take 325 mg by mouth daily. EKG: unchanged from previous tracings, normal sinus rhythm, nonspecific ST and T waves changes, PAC's noted, or sinus arrhythmia  . ASSESSMENT and PLAN  Encounter Diagnoses   Name Primary?  Palpitations Yes    Hypertension     Intolerance to statin because of myalgia. She has been doing well. She has had no significant palpitation lately She does have some PAC's or sinus arrhythmia. Her blood pressure has been under control. Her activity level is satisfactory for her age. For now, she will continue current medical regimen.

## 2018-11-08 RX ORDER — NIFEDIPINE 60 MG
60 TABLET, EXTENDED RELEASE 24 HR ORAL DAILY
Qty: 90 TAB | Refills: 3 | Status: SHIPPED | OUTPATIENT
Start: 2018-11-08 | End: 2019-11-18 | Stop reason: SDUPTHER

## 2019-01-28 ENCOUNTER — OFFICE VISIT (OUTPATIENT)
Dept: CARDIOLOGY CLINIC | Age: 84
End: 2019-01-28

## 2019-01-28 VITALS
OXYGEN SATURATION: 99 % | DIASTOLIC BLOOD PRESSURE: 64 MMHG | HEART RATE: 70 BPM | HEIGHT: 67 IN | BODY MASS INDEX: 23.39 KG/M2 | WEIGHT: 149 LBS | SYSTOLIC BLOOD PRESSURE: 150 MMHG

## 2019-01-28 DIAGNOSIS — I11.9 BENIGN HYPERTENSIVE HEART DISEASE WITHOUT HEART FAILURE: ICD-10-CM

## 2019-01-28 DIAGNOSIS — Z78.9 INTOLERANCE OF DRUG: ICD-10-CM

## 2019-01-28 DIAGNOSIS — R00.2 PALPITATIONS: Primary | ICD-10-CM

## 2019-01-28 RX ORDER — GLUCOSAMINE/CHONDR SU A SOD 167-133 MG
500 CAPSULE ORAL
COMMUNITY
End: 2020-08-06

## 2019-01-28 NOTE — PROGRESS NOTES
HISTORY OF PRESENT ILLNESS Momo Nguyen is a 80 y.o. female. HPI She has been doing well. She denies any cardiac symptoms. She denies chest pain, dyspnea, orthopnea, PND. She denies palpitations at this time. No dizziness or syncope. She denies any symptoms of TIA or amaurosis fugax. She looks very good for her age of 80. Her renal function is adequate with creatinine 1.2. Her HbA1c was 6.2%. Her blood pressure has been very good for her age. This patient has a history of palpitations and hypertension. She was treated with verapamil in the past, which had to be discontinued because of symptomatic bradycardia with dizziness. Her Holter monitor revealed bradycardia with a heart rate as low as the upper 30s which has improved since verapamil has been discontinued. She has since been on Procardia with good blood pressure control. She has had no anginal chest pain and has a history of a negative stress test in the past. She had a great deal of myalgia from Zocor and finally stopped taking Zocor, with improvement. She saw my nurse practitioner at the end of October of 2016 with palpitations.  At that time, she underwent a Holter monitor, which demonstrated sinus rhythm with occasional PVCs and also PACs.  She had nine runs of supraventricular tachycardia  maximum eight beats in duration.  She has since had no recurrent episodes of palpitations. Review of Systems Constitutional: Negative for malaise/fatigue and weight loss. HENT: Negative for hearing loss. Eyes: Negative for blurred vision and double vision. Respiratory: Negative for shortness of breath. Cardiovascular: Negative for chest pain, palpitations, orthopnea, claudication, leg swelling and PND. Gastrointestinal: Negative for blood in stool, heartburn and melena. Genitourinary: Negative for dysuria, frequency, hematuria and urgency. Musculoskeletal: Negative for back pain and joint pain. Skin: Negative for itching and rash. Neurological: Negative for dizziness, loss of consciousness and weakness. Psychiatric/Behavioral: Negative for depression and memory loss. Physical Exam  
Constitutional: She is oriented to person, place, and time. She appears well-developed and well-nourished. HENT:  
Head: Normocephalic and atraumatic. Eyes: Conjunctivae are normal. Pupils are equal, round, and reactive to light. Neck: Normal range of motion. Neck supple. No JVD present. Cardiovascular: Normal rate, regular rhythm, S1 normal and S2 normal.  No extrasystoles are present. PMI is not displaced. Exam reveals no gallop and no friction rub. No murmur heard. Pulses: 
     Carotid pulses are 3+ on the right side, and 3+ on the left side. Pulmonary/Chest: Effort normal. She has no rales. Abdominal: Soft. There is no tenderness. Musculoskeletal: She exhibits no edema. Neurological: She is alert and oriented to person, place, and time. No cranial nerve deficit. Skin: Skin is warm and dry. Psychiatric: She has a normal mood and affect. Her behavior is normal.  
 
Visit Vitals /64 Pulse 70 Ht 5' 7\" (1.702 m) Wt 67.6 kg (149 lb) SpO2 99% BMI 23.34 kg/m² Past Medical History:  
Diagnosis Date  Cardiac echocardiogram 02/13/2007 [facility]:  EF 65-70%. No WMA. Mild LVH. No significant valve abnormalities.  Cardiac Holter monitor 11/10/2016 Sinus rhythm, avg 71 bpm (range 57-96 bpm). Occasional SVEs (mainly single, w/paired and 9 runs of SVT, max 8 beats).  History of bradycardia   
 with verapamil  Hypercholesteremia  Hypertension  Palpitations Social History Socioeconomic History  Marital status:  Spouse name: Not on file  Number of children: Not on file  Years of education: Not on file  Highest education level: Not on file Social Needs  Financial resource strain: Not on file  Food insecurity - worry: Not on file  Food insecurity - inability: Not on file  Transportation needs - medical: Not on file  Transportation needs - non-medical: Not on file Occupational History  Not on file Tobacco Use  Smoking status: Never Smoker  Smokeless tobacco: Never Used Substance and Sexual Activity  Alcohol use: Yes Comment: drinks alcohol in moderation  Drug use: No  
 Sexual activity: Not on file Other Topics Concern  Not on file Social History Narrative  Not on file Family History Problem Relation Age of Onset  Hypertension Mother  Stroke Mother  Heart Disease Father Past Surgical History:  
Procedure Laterality Date  HX CATARACT REMOVAL  2001  
 bilateral  
 
 
Current Outpatient Medications Medication Sig Dispense Refill  nicotinic acid (NIACIN) 500 mg tablet Take 500 mg by mouth Daily (before breakfast).  PROCARDIA XL 60 mg ER tablet Take 1 Tab by mouth daily. 90 Tab 3  
 triamterene-hydroCHLOROthiazide (DYAZIDE) 37.5-25 mg per capsule TAKE 1 CAPSULE DAILY 90 Cap 3  
 aspirin (ASPIRIN) 325 mg tablet Take 325 mg by mouth daily. EKG: unchanged from previous tracings, normal sinus rhythm, nonspecific ST and T waves changes, PAC's noted, sinus arrhythmia [facility] ASSESSMENT and PLAN Encounter Diagnoses Name Primary?  Palpitations Yes  Hypertension  Intolerance to statin because of myalgia. She has been doing well. She has had no symptoms to indicate angina or cardiac decompensation. Her blood pressure has been under control for her age. She has had no significant palpitations at this time but continues to have some PAC's. For now, continue on current medical regimen.

## 2019-01-28 NOTE — PROGRESS NOTES
Ana Gee presents today for Chief Complaint Patient presents with  Follow-up 6 month - no cardiac complaints Connor [place] Every preferred language for health care discussion is english/other. Is someone accompanying this pt? No  
 
Is the patient using any DME equipment during OV? No  
 
Depression Screening: No flowsheet data found. Learning Assessment: 
Learning Assessment 11/24/2014 PRIMARY LEARNER Patient HIGHEST LEVEL OF EDUCATION - PRIMARY LEARNER  -  
BARRIERS PRIMARY LEARNER -  
CO-LEARNER CAREGIVER -  
PRIMARY LANGUAGE ENGLISH  NEED -  
LEARNER PREFERENCE PRIMARY READING  
ANSWERED BY Patient RELATIONSHIP SELF Abuse Screening: No flowsheet data found. Fall Risk No flowsheet data found. Pt currently taking Antiplatelet therapy? ASA Coordination of Care: 1. Have you been to the ER, urgent care clinic since your last visit? Hospitalized since your last visit? No  
 
2. Have you seen or consulted any other health care providers outside of the [address] [place] [address] since your last visit? Include any pap smears or colon screening.  NO

## 2019-02-25 RX ORDER — TRIAMTERENE AND HYDROCHLOROTHIAZIDE 37.5; 25 MG/1; MG/1
CAPSULE ORAL
Qty: 90 CAP | Refills: 3 | Status: SHIPPED | OUTPATIENT
Start: 2019-02-25 | End: 2020-02-18

## 2019-07-29 ENCOUNTER — OFFICE VISIT (OUTPATIENT)
Dept: CARDIOLOGY CLINIC | Age: 84
End: 2019-07-29

## 2019-07-29 VITALS
SYSTOLIC BLOOD PRESSURE: 138 MMHG | HEIGHT: 67 IN | BODY MASS INDEX: 23.7 KG/M2 | DIASTOLIC BLOOD PRESSURE: 66 MMHG | WEIGHT: 151 LBS | OXYGEN SATURATION: 98 % | HEART RATE: 73 BPM

## 2019-07-29 DIAGNOSIS — I11.9 BENIGN HYPERTENSIVE HEART DISEASE WITHOUT HEART FAILURE: ICD-10-CM

## 2019-07-29 DIAGNOSIS — Z78.9 INTOLERANCE OF DRUG: ICD-10-CM

## 2019-07-29 DIAGNOSIS — R00.2 PALPITATIONS: Primary | ICD-10-CM

## 2019-07-29 NOTE — PROGRESS NOTES
HISTORY OF PRESENT ILLNESS  Connor Sullivan is a 80 y.o. female. HPI  She looks very good for her age of 80 going on 80. She has had no chest pain, dyspnea orthopnea, PND. She denies palpitations, dizziness or syncope. She has had no symptoms to indicate TIA or amaurosis fugax. She remains reasonably active. She walked from the parking lot to our office which was a long way with no difficulties. She has normal renal function and hemoglobin A1c is in the lower 6% range. Her blood pressure has been under control. This patient has a history of palpitations and hypertension. She was treated with verapamil in the past, which had to be discontinued because of symptomatic bradycardia with dizziness. Her Holter monitor revealed bradycardia with a heart rate as low as the upper 30s which has improved since verapamil has been discontinued. She has since been on Procardia with good blood pressure control. She has had no anginal chest pain and has a history of a negative stress test in the past. She had a great deal of myalgia from Zocor and finally stopped taking Zocor, with improvement. She saw my nurse practitioner at the end of October of 2016 with palpitations.  At that time, she underwent a Holter monitor, which demonstrated sinus rhythm with occasional PVCs and also PACs.  She had nine runs of supraventricular tachycardia  maximum eight beats in duration.  She has since had no recurrent episodes of palpitations. Review of Systems   Constitutional: Negative for malaise/fatigue and weight loss. HENT: Negative for hearing loss. Eyes: Negative for blurred vision and double vision. Respiratory: Negative for shortness of breath. Cardiovascular: Negative for chest pain, palpitations, orthopnea, claudication, leg swelling and PND. Gastrointestinal: Negative for blood in stool, heartburn and melena. Genitourinary: Negative for dysuria, frequency, hematuria and urgency.    Musculoskeletal: Negative for back pain and joint pain. Skin: Negative for itching and rash. Neurological: Negative for dizziness and loss of consciousness. Psychiatric/Behavioral: Negative for depression and memory loss. Physical Exam   Constitutional: She is oriented to person, place, and time. She appears well-developed and well-nourished. HENT:   Head: Normocephalic and atraumatic. Eyes: Pupils are equal, round, and reactive to light. Conjunctivae are normal.   Neck: Normal range of motion. Neck supple. No JVD present. Cardiovascular: Normal rate, regular rhythm, S1 normal and S2 normal.  No extrasystoles are present. PMI is not displaced. Exam reveals no gallop and no friction rub. No murmur heard. Pulses:       Carotid pulses are 3+ on the right side, and 3+ on the left side. Pulmonary/Chest: Effort normal. She has no rales. Abdominal: Soft. There is no tenderness. Musculoskeletal: She exhibits no edema. Neurological: She is alert and oriented to person, place, and time. No cranial nerve deficit. Skin: Skin is warm and dry. Psychiatric: She has a normal mood and affect. Her behavior is normal.     Visit Vitals  /66   Pulse 73   Ht 5' 7\" (1.702 m)   Wt 68.5 kg (151 lb)   SpO2 98%   BMI 23.65 kg/m²       Past Medical History:   Diagnosis Date    Cardiac echocardiogram 02/13/2007    [facility]:  EF 65-70%. No WMA. Mild LVH. No significant valve abnormalities.  Cardiac Holter monitor 11/10/2016    Sinus rhythm, avg 71 bpm (range 57-96 bpm). Occasional SVEs (mainly single, w/paired and 9 runs of SVT, max 8 beats).       History of bradycardia     with verapamil    Hypercholesteremia     Hypertension     Palpitations        Social History     Socioeconomic History    Marital status:      Spouse name: Not on file    Number of children: Not on file    Years of education: Not on file    Highest education level: Not on file   Occupational History    Not on file   Social Needs    Financial resource strain: Not on file    Food insecurity:     Worry: Not on file     Inability: Not on file    Transportation needs:     Medical: Not on file     Non-medical: Not on file   Tobacco Use    Smoking status: Never Smoker    Smokeless tobacco: Never Used   Substance and Sexual Activity    Alcohol use: Yes     Comment: drinks alcohol in moderation    Drug use: No    Sexual activity: Not on file   Lifestyle    Physical activity:     Days per week: Not on file     Minutes per session: Not on file    Stress: Not on file   Relationships    Social connections:     Talks on phone: Not on file     Gets together: Not on file     Attends Cheondoism service: Not on file     Active member of club or organization: Not on file     Attends meetings of clubs or organizations: Not on file     Relationship status: Not on file    Intimate partner violence:     Fear of current or ex partner: Not on file     Emotionally abused: Not on file     Physically abused: Not on file     Forced sexual activity: Not on file   Other Topics Concern    Not on file   Social History Narrative    Not on file       Family History   Problem Relation Age of Onset    Hypertension Mother     Stroke Mother     Heart Disease Father        Past Surgical History:   Procedure Laterality Date    HX CATARACT REMOVAL  2001    bilateral       Current Outpatient Medications   Medication Sig Dispense Refill    triamterene-hydroCHLOROthiazide (DYAZIDE) 37.5-25 mg per capsule TAKE 1 CAPSULE DAILY 90 Cap 3    nicotinic acid (NIACIN) 500 mg tablet Take 500 mg by mouth Daily (before breakfast).  PROCARDIA XL 60 mg ER tablet Take 1 Tab by mouth daily. 90 Tab 3    aspirin (ASPIRIN) 325 mg tablet Take 325 mg by mouth daily. EKG: unchanged from previous tracings, normal sinus rhythm, nonspecific ST and T waves changes, occasional PVC noted, unifocal, sinus arrhythmia  . ASSESSMENT and PLAN  Encounter Diagnoses   Name Primary?     Palpitations Yes    Hypertension     Intolerance to statin because of myalgia. She has been feeling quite well. She has had no symptoms to indicate angina or cardiac decompensation. She has had very little if any palpitation at this time. Her blood pressure has been under control. Her renal function is still normal for her age of [address] going on 80. For now, continue on current medical regimen.

## 2019-07-29 NOTE — PROGRESS NOTES
Berta Zarate presents today for   Chief Complaint   Patient presents with    Palpitations     6 month        Celmari Headley preferred language for health care discussion is english/other. Is someone accompanying this pt? no    Is the patient using any DME equipment during [address]? no    Depression Screening:  3 most recent PHQ Screens 7/29/2019   Little interest or pleasure in doing things Not at all   Feeling down, depressed, irritable, or hopeless Not at all   Total Score PHQ 2 0       Learning Assessment:  Learning Assessment 11/24/2014   PRIMARY LEARNER Patient   HIGHEST LEVEL OF EDUCATION - PRIMARY LEARNER  -   BARRIERS PRIMARY LEARNER -   CO-LEARNER CAREGIVER -   PRIMARY LANGUAGE ENGLISH    NEED -   LEARNER PREFERENCE PRIMARY READING   ANSWERED BY Patient    RELATIONSHIP SELF       Abuse Screening:  Abuse Screening Questionnaire 7/29/2019   Do you ever feel afraid of your partner? N   Are you in a relationship with someone who physically or mentally threatens you? N   Is it safe for you to go home? Y       Fall Risk  Fall Risk Assessment, last 12 mths 7/29/2019   Able to walk? Yes   Fall in past 12 months? No       Pt currently taking Anticoagulant therapy? no    Coordination of Care:  1. Have you been to the ER, urgent care clinic since your last visit? Hospitalized since your last visit? no    2. Have you seen or consulted any other health care providers outside of the [address] [place] [address] since your last visit? Include any pap smears or colon screening.  no

## 2019-11-18 RX ORDER — NIFEDIPINE 60 MG
60 TABLET, EXTENDED RELEASE 24 HR ORAL DAILY
Qty: 90 TAB | Refills: 3 | Status: SHIPPED | OUTPATIENT
Start: 2019-11-18 | End: 2020-10-27

## 2020-02-06 ENCOUNTER — OFFICE VISIT (OUTPATIENT)
Dept: CARDIOLOGY CLINIC | Age: 85
End: 2020-02-06

## 2020-02-06 VITALS
BODY MASS INDEX: 23.39 KG/M2 | OXYGEN SATURATION: 98 % | HEIGHT: 67 IN | WEIGHT: 149 LBS | HEART RATE: 72 BPM | DIASTOLIC BLOOD PRESSURE: 58 MMHG | SYSTOLIC BLOOD PRESSURE: 150 MMHG

## 2020-02-06 DIAGNOSIS — R00.2 PALPITATIONS: Primary | ICD-10-CM

## 2020-02-06 DIAGNOSIS — I11.9 BENIGN HYPERTENSIVE HEART DISEASE WITHOUT HEART FAILURE: ICD-10-CM

## 2020-02-06 DIAGNOSIS — Z78.9 INTOLERANCE OF DRUG: ICD-10-CM

## 2020-02-06 RX ORDER — CETIRIZINE HCL 10 MG
TABLET ORAL
COMMUNITY
Start: 2020-01-08 | End: 2021-05-05

## 2020-02-06 RX ORDER — DICLOFENAC SODIUM AND MISOPROSTOL 50; 200 MG/1; UG/1
TABLET, DELAYED RELEASE ORAL
Refills: 0 | COMMUNITY
Start: 2019-11-05

## 2020-02-06 NOTE — PROGRESS NOTES
HISTORY OF PRESENT ILLNESS  Connor Echeverria is a 80 y.o. female. HPI  She has been feeling well. She does indicate that she is slowing down. She gets tired more easily than she used to. She denies any cardiac symptoms other than rare palpitations which last for a few seconds at a time. She has had no chest pain, dyspnea, orthopnea, PND. She has had no dizziness or syncope. She has had no symptoms to indicate TIA or amaurosis fugax. Her blood pressure has been good and under control for her age. This patient has a history of palpitations and hypertension. She was treated with verapamil in the past, which had to be discontinued because of symptomatic bradycardia with dizziness. Her Holter monitor revealed bradycardia with a heart rate as low as the upper 30s which has improved since verapamil has been discontinued. She has since been on Procardia with good blood pressure control. She has had no anginal chest pain and has a history of a negative stress test in the past. She had a great deal of myalgia from Zocor and finally stopped taking Zocor, with improvement. She saw my nurse practitioner at the end of October of 2016 with palpitations.  At that time, she underwent a Holter monitor, which demonstrated sinus rhythm with occasional PVCs and also PACs.  She had nine runs of supraventricular tachycardia  maximum eight beats in duration.  She has since had no recurrent episodes of palpitations. Review of Systems   Constitutional: Negative for malaise/fatigue and weight loss. HENT: Negative for hearing loss. Eyes: Negative for blurred vision and double vision. Respiratory: Negative for shortness of breath. Cardiovascular: Positive for palpitations. Negative for chest pain, orthopnea, claudication, leg swelling and PND. Gastrointestinal: Negative for blood in stool, heartburn and melena. Genitourinary: Negative for dysuria, frequency, hematuria and urgency.    Musculoskeletal: Negative for back pain and joint pain. Skin: Negative for itching and rash. Neurological: Negative for dizziness and loss of consciousness. Psychiatric/Behavioral: Negative for depression and memory loss. Physical Exam   Constitutional: She is oriented to person, place, and time. She appears well-developed and well-nourished. HENT:   Head: Normocephalic and atraumatic. Eyes: Pupils are equal, round, and reactive to light. Conjunctivae are normal.   Neck: Normal range of motion. Neck supple. No JVD present. Cardiovascular: Normal rate, regular rhythm, S1 normal and S2 normal.  Extrasystoles are present. PMI is not displaced. Exam reveals no gallop and no friction rub. No murmur heard. Pulses:       Carotid pulses are 3+ on the right side and 3+ on the left side. Pulmonary/Chest: Effort normal. She has no rales. Abdominal: Soft. There is no abdominal tenderness. Musculoskeletal:         General: No edema. Neurological: She is alert and oriented to person, place, and time. No cranial nerve deficit. Skin: Skin is warm and dry. Psychiatric: She has a normal mood and affect. Her behavior is normal.     Visit Vitals  /58   Pulse 72   Ht 5' 7\" (1.702 m)   Wt 67.6 kg (149 lb)   SpO2 98%   BMI 23.34 kg/m²       Past Medical History:   Diagnosis Date    Cardiac echocardiogram 02/13/2007    [facility]:  EF 65-70%. No WMA. Mild LVH. No significant valve abnormalities.  Cardiac Holter monitor 11/10/2016    Sinus rhythm, avg 71 bpm (range 57-96 bpm). Occasional SVEs (mainly single, w/paired and 9 runs of SVT, max 8 beats).       History of bradycardia     with verapamil    Hypercholesteremia     Hypertension     Palpitations        Social History     Socioeconomic History    Marital status:      Spouse name: Not on file    Number of children: Not on file    Years of education: Not on file    Highest education level: Not on file   Occupational History    Not on file   Social Needs    Financial resource strain: Not on file    Food insecurity:     Worry: Not on file     Inability: Not on file    Transportation needs:     Medical: Not on file     Non-medical: Not on file   Tobacco Use    Smoking status: Never Smoker    Smokeless tobacco: Never Used   Substance and Sexual Activity    Alcohol use: Yes     Comment: drinks alcohol in moderation    Drug use: No    Sexual activity: Not on file   Lifestyle    Physical activity:     Days per week: Not on file     Minutes per session: Not on file    Stress: Not on file   Relationships    Social connections:     Talks on phone: Not on file     Gets together: Not on file     Attends Hinduism service: Not on file     Active member of club or organization: Not on file     Attends meetings of clubs or organizations: Not on file     Relationship status: Not on file    Intimate partner violence:     Fear of current or ex partner: Not on file     Emotionally abused: Not on file     Physically abused: Not on file     Forced sexual activity: Not on file   Other Topics Concern    Not on file   Social History Narrative    Not on file       Family History   Problem Relation Age of Onset    Hypertension Mother     Stroke Mother     Heart Disease Father        Past Surgical History:   Procedure Laterality Date    HX CATARACT REMOVAL  2001    bilateral       Current Outpatient Medications   Medication Sig Dispense Refill    diclofenac-miSOPROStol (ARTHROTEC 50)  mg-mcg per tablet take 1 tablet by mouth once daily  0    PROCARDIA XL 60 mg ER tablet Take 1 Tab by mouth daily. 90 Tab 3    triamterene-hydroCHLOROthiazide (DYAZIDE) 37.5-25 mg per capsule TAKE 1 CAPSULE DAILY 90 Cap 3    nicotinic acid (NIACIN) 500 mg tablet Take 500 mg by mouth Daily (before breakfast).  aspirin (ASPIRIN) 325 mg tablet Take 325 mg by mouth daily.       cetirizine (ZYRTEC) 10 mg tablet take 1 tablet by mouth once daily for allergies         EKG: unchanged from previous tracings, normal sinus rhythm, nonspecific ST and T waves changes, PAC's noted. ASSESSMENT and PLAN  Encounter Diagnoses   Name Primary?  Palpitations Yes    Hypertension     Intolerance to statin because of myalgia. She continues to do well. She has had very little palpitation lately. On electrocardiogram, she has predominantly PACs. She has not had any prolonged palpitation to indicate recurrent supraventricular tachycardia. She has had no symptoms to indicate angina or cardiac decompensation. Her blood pressure has been good for her age thus far. For now, continue on current medical regimen. Her future cardiac care will be carried over to Dr. Reji Quintanilla in June 2020 upon my custodial. I had a chance to introduce Mrs. Smith to Dr. Reji Quintanilla in the office today.

## 2020-02-06 NOTE — PROGRESS NOTES
Tyra Paez presents today for   Chief Complaint   Patient presents with    Palpitations     6 MONTH F/U - no cardiac complaints       Connor Ferreira preferred language for health care discussion is english/other. Is someone accompanying this pt? no    Is the patient using any DME equipment during [address]? no    Depression Screening:  3 most recent PHQ Screens 7/29/2019   Little interest or pleasure in doing things Not at all   Feeling down, depressed, irritable, or hopeless Not at all   Total Score PHQ 2 0       Learning Assessment:  Learning Assessment 11/24/2014   PRIMARY LEARNER Patient   HIGHEST LEVEL OF EDUCATION - PRIMARY LEARNER  -   BARRIERS PRIMARY LEARNER -   CO-LEARNER CAREGIVER -   PRIMARY LANGUAGE ENGLISH    NEED -   LEARNER PREFERENCE PRIMARY READING   ANSWERED BY Patient    RELATIONSHIP SELF       Abuse Screening:  Abuse Screening Questionnaire 7/29/2019   Do you ever feel afraid of your partner? N   Are you in a relationship with someone who physically or mentally threatens you? N   Is it safe for you to go home? Y       Fall Risk  Fall Risk Assessment, last 12 mths 7/29/2019   Able to walk? Yes   Fall in past 12 months? No       Pt currently taking Anticoagulant therapy? no    Coordination of Care:  1. Have you been to the ER, urgent care clinic since your last visit? Hospitalized since your last visit? no    2. Have you seen or consulted any other health care providers outside of the [address] [place] [address] since your last visit? Include any pap smears or colon screening.  no

## 2020-02-18 RX ORDER — TRIAMTERENE AND HYDROCHLOROTHIAZIDE 37.5; 25 MG/1; MG/1
CAPSULE ORAL
Qty: 90 CAP | Refills: 4 | Status: SHIPPED | OUTPATIENT
Start: 2020-02-18 | End: 2021-05-18 | Stop reason: SDUPTHER

## 2020-08-06 ENCOUNTER — OFFICE VISIT (OUTPATIENT)
Dept: CARDIOLOGY CLINIC | Age: 85
End: 2020-08-06

## 2020-08-06 VITALS
OXYGEN SATURATION: 98 % | DIASTOLIC BLOOD PRESSURE: 64 MMHG | SYSTOLIC BLOOD PRESSURE: 148 MMHG | HEART RATE: 58 BPM | BODY MASS INDEX: 23.23 KG/M2 | WEIGHT: 148 LBS | HEIGHT: 67 IN

## 2020-08-06 DIAGNOSIS — R00.2 PALPITATIONS: Primary | ICD-10-CM

## 2020-08-06 DIAGNOSIS — I11.9 BENIGN HYPERTENSIVE HEART DISEASE WITHOUT HEART FAILURE: ICD-10-CM

## 2020-08-06 DIAGNOSIS — Z78.9 INTOLERANCE OF DRUG: ICD-10-CM

## 2020-08-06 RX ORDER — ASPIRIN 81 MG/1
81 TABLET ORAL DAILY
Qty: 30 TAB | Refills: 5 | Status: SHIPPED | OUTPATIENT
Start: 2020-08-06

## 2020-08-06 NOTE — PROGRESS NOTES
Kush Centeno    Chief Complaint   Patient presents with    Follow-up     6 month follow up        HPI    Kush Cneteno is a 80 y.o. with no known coronary disease here for her routine follow-up care due to history of SVT, PVCs and PACs as well as dyslipidemia with statin intolerance. She was on verapamil in the past, which had to be discontinued because of symptomatic bradycardia with dizziness. Her Holter monitor revealed bradycardia with a heart rate as low as the upper 30s which has improved since verapamil has been discontinued. She has since been on Procardia with good blood pressure control. She has had no anginal chest pain and has a history of a negative stress test in the past. She had a great deal of myalgia from Zocor and finally stopped taking Zocor, with improvement. She saw my nurse practitioner at the end of October of 2016 with palpitations.  At that time, she underwent a Holter monitor, which demonstrated sinus rhythm with occasional PVCs and also PACs.  She had nine runs of supraventricular tachycardia  maximum eight beats in duration.  She has since had no recurrent episodes of palpitations.        Past Medical History:   Diagnosis Date    Cardiac echocardiogram 02/13/2007    [facility]:  EF 65-70%. No WMA. Mild LVH. No significant valve abnormalities.  Cardiac Holter monitor 11/10/2016    Sinus rhythm, avg 71 bpm (range 57-96 bpm). Occasional SVEs (mainly single, w/paired and 9 runs of SVT, max 8 beats).       History of bradycardia     with verapamil    Hypercholesteremia     Hypertension     Palpitations        Past Surgical History:   Procedure Laterality Date    HX CATARACT REMOVAL  2001    bilateral       Current Outpatient Medications   Medication Sig Dispense Refill    triamterene-hydroCHLOROthiazide (DYAZIDE) 37.5-25 mg per capsule TAKE 1 CAPSULE DAILY 90 Cap 4    diclofenac-miSOPROStol (ARTHROTEC 50)  mg-mcg per tablet take 1 tablet by mouth once daily 0    cetirizine (ZYRTEC) 10 mg tablet take 1 tablet by mouth once daily for allergies      PROCARDIA XL 60 mg ER tablet Take 1 Tab by mouth daily. 90 Tab 3    nicotinic acid (NIACIN) 500 mg tablet Take 500 mg by mouth Daily (before breakfast).  aspirin (ASPIRIN) 325 mg tablet Take 325 mg by mouth daily.          Allergies   Allergen Reactions    Other Medication Other (comments)     Allergy:  Most generic medication (dizziness, presyncope, equilibrium off, mental confusion/dazed feeling)    Verapamil Other (comments)     Symptomatic bradycardia and dizziness    Zetia [Ezetimibe] Other (comments)     Became weak and unable to walk, muscle aches       Social History     Socioeconomic History    Marital status:      Spouse name: Not on file    Number of children: Not on file    Years of education: Not on file    Highest education level: Not on file   Occupational History    Not on file   Social Needs    Financial resource strain: Not on file    Food insecurity     Worry: Not on file     Inability: Not on file    Transportation needs     Medical: Not on file     Non-medical: Not on file   Tobacco Use    Smoking status: Never Smoker    Smokeless tobacco: Never Used   Substance and Sexual Activity    Alcohol use: Yes     Comment: drinks alcohol in moderation    Drug use: No    Sexual activity: Not on file   Lifestyle    Physical activity     Days per week: Not on file     Minutes per session: Not on file    Stress: Not on file   Relationships    Social connections     Talks on phone: Not on file     Gets together: Not on file     Attends Holiness service: Not on file     Active member of club or organization: Not on file     Attends meetings of clubs or organizations: Not on file     Relationship status: Not on file    Intimate partner violence     Fear of current or ex partner: Not on file     Emotionally abused: Not on file     Physically abused: Not on file     Forced sexual activity: Not on file   Other Topics Concern    Not on file   Social History Narrative    Not on file    has a son who lives at her house, but does all ADLs on her own    FH: n/a    Review of Systems    14 pt Review of Systems is negative unless otherwise mentioned in the HPI. Wt Readings from Last 3 Encounters:   02/06/20 67.6 kg (149 lb)   07/29/19 68.5 kg (151 lb)   01/28/19 67.6 kg (149 lb)     Temp Readings from Last 3 Encounters:   No data found for Temp     BP Readings from Last 3 Encounters:   02/06/20 150/58   07/29/19 138/66   01/28/19 150/64     Pulse Readings from Last 3 Encounters:   02/06/20 72   07/29/19 73   01/28/19 70       Physical Exam:    Visit Vitals  Ht 5' 7\" (1.702 m)   BMI 23.34 kg/m²      Physical Exam  HENT:      Head: Normocephalic and atraumatic. Eyes:      Pupils: Pupils are equal, round, and reactive to light. Cardiovascular:      Rate and Rhythm: Normal rate and regular rhythm. Heart sounds: Normal heart sounds. No murmur. No friction rub. No gallop. Pulmonary:      Effort: Pulmonary effort is normal. No respiratory distress. Breath sounds: Normal breath sounds. No wheezing or rales. Chest:      Chest wall: No tenderness. Abdominal:      General: Bowel sounds are normal.      Palpations: Abdomen is soft. Musculoskeletal:         General: No tenderness. Skin:     General: Skin is warm and dry. Neurological:      Mental Status: She is alert and oriented to person, place, and time. EKG: unchanged from previous tracings, normal sinus rhythm, nonspecific ST and T waves changes, PAC's noted.      No results found for: CHOL, CHOLPOCT, CHOLX, CHLST, CHOLV, HDL, HDLPOC, HDLP, LDL, LDLCPOC, LDLC, DLDLP, VLDLC, VLDL, TGLX, TRIGL, TRIGP, TGLPOCT, CHHD, CHHDX    Impression and Plan:  Melo Ba is a 80 y.o. with:    1.) H/o SVT, PACs, PVCs, known/ stable  2.) H/o dyslipidemia, with statin intolerance  3.) No h/o CVA  4.) Normal LV function  5.) HTN, well controlled    1.) Stop Niacin today  2.) Can reduce ASA to 81 mg daily  3.) Overall doing well, BP well controlled  4.) RTC 6 months    Overall this patient does extraordinarily well for her age. No she has been on niacin for a while and tolerates it but I just do not see that there is significant benefit at her age to be this aggressive. Particularly she is never had a stroke and has no known documented coronary disease-so I see no benefit in using over 81 mg of aspirin and niacin has not been shown to prevent future ASCVD events. She was comfortable with this medication change. Thank you for allowing me to participate in the care of your patient, please do not hesitate to call with questions or concerns.     [address],    Charlene Roman, DO

## 2020-08-06 NOTE — PROGRESS NOTES
Eliseo Chrisire presents today for   Chief Complaint   Patient presents with    Follow-up     6 month follow up        lEiseo Lopez preferred language for health care discussion is english/other. Is someone accompanying this pt? no    Is the patient using any DME equipment during [address]? no    Depression Screening:  3 most recent PHQ Screens 8/6/2020   Little interest or pleasure in doing things Not at all   Feeling down, depressed, irritable, or hopeless Not at all   Total Score PHQ 2 0       Learning Assessment:  Learning Assessment 11/24/2014   PRIMARY LEARNER Patient   HIGHEST LEVEL OF EDUCATION - PRIMARY LEARNER  -   BARRIERS PRIMARY LEARNER -   [address] [place] [address]    NEED -   LEARNER PREFERENCE PRIMARY READING   ANSWERED BY Patient    RELATIONSHIP SELF       Abuse Screening:  Abuse Screening Questionnaire 8/6/2020   Do you ever feel afraid of your partner? N   Are you in a relationship with someone who physically or mentally threatens you? N   Is it safe for you to go home? Y       Fall Risk  Fall Risk Assessment, last 12 mths 8/6/2020   Able to walk? Yes   Fall in past 12 months? No       Pt currently taking Anticoagulant therapy? 325mg ASA every day     Coordination of Care:  1. Have you been to the ER, urgent care clinic since your last visit? Hospitalized since your last visit? no    2. Have you seen or consulted any other health care providers outside of the [address] [place] [address] since your last visit? Include any pap smears or colon screening.  no

## 2020-10-27 RX ORDER — NIFEDIPINE 60 MG
TABLET, EXTENDED RELEASE 24 HR ORAL
Qty: 90 TAB | Refills: 3 | Status: SHIPPED | OUTPATIENT
Start: 2020-10-27 | End: 2021-10-22

## 2021-05-05 ENCOUNTER — OFFICE VISIT (OUTPATIENT)
Dept: CARDIOLOGY CLINIC | Age: 86
End: 2021-05-05
Payer: MEDICARE

## 2021-05-05 VITALS
WEIGHT: 154 LBS | DIASTOLIC BLOOD PRESSURE: 78 MMHG | HEIGHT: 67 IN | HEART RATE: 74 BPM | SYSTOLIC BLOOD PRESSURE: 130 MMHG | OXYGEN SATURATION: 98 % | BODY MASS INDEX: 24.17 KG/M2

## 2021-05-05 DIAGNOSIS — R00.2 PALPITATIONS: Primary | ICD-10-CM

## 2021-05-05 PROCEDURE — G8536 NO DOC ELDER MAL SCRN: HCPCS | Performed by: INTERNAL MEDICINE

## 2021-05-05 PROCEDURE — G8420 CALC BMI NORM PARAMETERS: HCPCS | Performed by: INTERNAL MEDICINE

## 2021-05-05 PROCEDURE — 93000 ELECTROCARDIOGRAM COMPLETE: CPT | Performed by: INTERNAL MEDICINE

## 2021-05-05 PROCEDURE — G8427 DOCREV CUR MEDS BY ELIG CLIN: HCPCS | Performed by: INTERNAL MEDICINE

## 2021-05-05 PROCEDURE — G8510 SCR DEP NEG, NO PLAN REQD: HCPCS | Performed by: INTERNAL MEDICINE

## 2021-05-05 PROCEDURE — 1101F PT FALLS ASSESS-DOCD LE1/YR: CPT | Performed by: INTERNAL MEDICINE

## 2021-05-05 PROCEDURE — 1090F PRES/ABSN URINE INCON ASSESS: CPT | Performed by: INTERNAL MEDICINE

## 2021-05-05 PROCEDURE — 99214 OFFICE O/P EST MOD 30 MIN: CPT | Performed by: INTERNAL MEDICINE

## 2021-05-05 NOTE — PROGRESS NOTES
Jeronimo Lopez presents today for   Chief Complaint   Patient presents with    Follow-up     9 month f/u       Connor Latif preferred language for health care discussion is english/other. Is someone accompanying this pt? no    Is the patient using any DME equipment during [address]? no    Depression Screening:  3 most recent PHQ Screens 5/5/2021   Little interest or pleasure in doing things Not at all   Feeling down, depressed, irritable, or hopeless Not at all   Total Score PHQ 2 0       Learning Assessment:  Learning Assessment 11/24/2014   PRIMARY LEARNER Patient   HIGHEST LEVEL OF EDUCATION - PRIMARY LEARNER  -   BARRIERS PRIMARY LEARNER -   [facility]    NEED -   LEARNER PREFERENCE PRIMARY READING   ANSWERED BY Patient    RELATIONSHIP SELF       Abuse Screening:  Abuse Screening Questionnaire 5/5/2021   Do you ever feel afraid of your partner? N   Are you in a relationship with someone who physically or mentally threatens you? N   Is it safe for you to go home? Y       Fall Risk  Fall Risk Assessment, last 12 mths 5/5/2021   Able to walk? Yes   Fall in past 12 months? 0   Do you feel unsteady? 0   Are you worried about falling 0       Pt currently taking Anticoagulant therapy? no    Coordination of Care:  1. Have you been to the ER, urgent care clinic since your last visit? Hospitalized since your last visit? no    2. Have you seen or consulted any other health care providers outside of the [address] [place] [address] since your last visit? Include any pap smears or colon screening.  no

## 2021-05-05 NOTE — PROGRESS NOTES
Kush Centeno    Chief Complaint   Patient presents with    Follow-up     9 month f/u       HPI    Kush Centeno is a 80 y.o. with no known coronary disease here for her routine follow-up care due to history of SVT, PVCs and PACs as well as dyslipidemia with statin intolerance. She was on verapamil in the past, which had to be discontinued because of symptomatic bradycardia with dizziness. Her Holter monitor revealed bradycardia with a heart rate as low as the upper 30s which has improved since verapamil has been discontinued. She has since been on Procardia with good blood pressure control. She has had no anginal chest pain and has a history of a negative stress test in the past. She had a great deal of myalgia from Zocor and finally stopped taking Zocor, with improvement. She saw my nurse practitioner at the end of October of 2016 with palpitations.  At that time, she underwent a Holter monitor, which demonstrated sinus rhythm with occasional PVCs and also PACs.  She had nine runs of supraventricular tachycardia  maximum eight beats in duration.  She has since had no recurrent episodes of palpitations.        Past Medical History:   Diagnosis Date    Cardiac echocardiogram 02/13/2007    [facility]:  EF 65-70%. No WMA. Mild LVH. No significant valve abnormalities.  Cardiac Holter monitor 11/10/2016    Sinus rhythm, avg 71 bpm (range 57-96 bpm). Occasional SVEs (mainly single, w/paired and 9 runs of SVT, max 8 beats).  History of bradycardia     with verapamil    Hypercholesteremia     Hypertension     Palpitations        Past Surgical History:   Procedure Laterality Date    HX CATARACT REMOVAL  2001    bilateral       Current Outpatient Medications   Medication Sig Dispense Refill    Procardia XL 60 mg ER tablet TAKE 1 TABLET DAILY 90 Tab 3    aspirin delayed-release 81 mg tablet Take 1 Tab by mouth daily.  30 Tab 5    triamterene-hydroCHLOROthiazide (DYAZIDE) 37.5-25 mg per capsule TAKE 1 CAPSULE DAILY 90 Cap 4    diclofenac-miSOPROStol (ARTHROTEC 50)  mg-mcg per tablet take 1 tablet by mouth once daily  0       Allergies   Allergen Reactions    Other Medication Other (comments)     Allergy:  Most generic medication (dizziness, presyncope, equilibrium off, mental confusion/dazed feeling)    Verapamil Other (comments)     Symptomatic bradycardia and dizziness    Zetia [Ezetimibe] Other (comments)     Became weak and unable to walk, muscle aches       Social History     Socioeconomic History    Marital status:      Spouse name: Not on file    Number of children: Not on file    Years of education: Not on file    Highest education level: Not on file   Occupational History    Not on file   Social Needs    Financial resource strain: Not on file    Food insecurity     Worry: Not on file     Inability: Not on file    Transportation needs     Medical: Not on file     Non-medical: Not on file   Tobacco Use    Smoking status: Never Smoker    Smokeless tobacco: Never Used   Substance and Sexual Activity    Alcohol use: Yes     Comment: drinks alcohol in moderation    Drug use: No    Sexual activity: Not on file   Lifestyle    Physical activity     Days per week: Not on file     Minutes per session: Not on file    Stress: Not on file   Relationships    Social connections     Talks on phone: Not on file     Gets together: Not on file     Attends Mormon service: Not on file     Active member of club or organization: Not on file     Attends meetings of clubs or organizations: Not on file     Relationship status: Not on file    Intimate partner violence     Fear of current or ex partner: Not on file     Emotionally abused: Not on file     Physically abused: Not on file     Forced sexual activity: Not on file   Other Topics Concern    Not on file   Social History Narrative    Not on file    has a son who lives at her house, but does all ADLs on her own    FH: n/a    Review of Systems    14 pt Review of Systems is negative unless otherwise mentioned in the HPI. Wt Readings from Last 3 Encounters:   05/05/21 69.9 kg (154 lb)   08/06/20 67.1 kg (148 lb)   02/06/20 67.6 kg (149 lb)     Temp Readings from Last 3 Encounters:   No data found for Temp     BP Readings from Last 3 Encounters:   05/05/21 130/78   08/06/20 148/64   02/06/20 150/58     Pulse Readings from Last 3 Encounters:   05/05/21 74   08/06/20 (!) 58   02/06/20 72       Physical Exam:    Visit Vitals  /78 (BP 1 Location: Left upper arm, BP Patient Position: Sitting, BP Cuff Size: Adult)   Pulse 74   Ht 5' 7\" (1.702 m)   Wt 69.9 kg (154 lb)   SpO2 98%   BMI 24.12 kg/m²      Physical Exam  HENT:      Head: Normocephalic and atraumatic. Eyes:      Pupils: Pupils are equal, round, and reactive to light. Cardiovascular:      Rate and Rhythm: Normal rate and regular rhythm. Heart sounds: Normal heart sounds. No murmur. No friction rub. No gallop. Pulmonary:      Effort: Pulmonary effort is normal. No respiratory distress. Breath sounds: Normal breath sounds. No wheezing or rales. Chest:      Chest wall: No tenderness. Abdominal:      General: Bowel sounds are normal.      Palpations: Abdomen is soft. Musculoskeletal:         General: No tenderness. Skin:     General: Skin is warm and dry. Neurological:      Mental Status: She is alert and oriented to person, place, and time. EKG: unchanged from previous tracings, normal sinus rhythm, nonspecific ST and T waves changes, PAC's noted.      No results found for: CHOL, CHOLPOCT, CHOLX, CHLST, CHOLV, HDL, HDLPOC, HDLP, LDL, LDLCPOC, LDLC, DLDLP, VLDLC, VLDL, TGLX, TRIGL, TRIGP, TGLPOCT, CHHD, CHHDX    Impression and Plan:  Richy Vargas is a 80 y.o. with:    1.) H/o SVT, PACs, PVCs, known/ stable  2.) H/o dyslipidemia, with statin intolerance  3.) No h/o CVA  4.) Normal LV function  5.) HTN, well controlled    1.) Stopped Niacin last time, reduced her ASA and only continued on BP meds  2.) Doing really well from CV standpoint, can RTC yearly      Thank you for allowing me to participate in the care of your patient, please do not hesitate to call with questions or concerns. Follow-up and Dispositions    · Return in about 1 year (around 5/5/2022).      [facility],    Gwen Standard, DO

## 2021-05-19 RX ORDER — TRIAMTERENE AND HYDROCHLOROTHIAZIDE 37.5; 25 MG/1; MG/1
CAPSULE ORAL
Qty: 90 CAPSULE | Refills: 3 | Status: SHIPPED | OUTPATIENT
Start: 2021-05-19 | End: 2022-05-20 | Stop reason: SDUPTHER

## 2021-10-22 RX ORDER — NIFEDIPINE 60 MG
TABLET, EXTENDED RELEASE 24 HR ORAL
Qty: 90 TABLET | Refills: 3 | Status: SHIPPED | OUTPATIENT
Start: 2021-10-22 | End: 2022-10-26

## 2022-05-06 ENCOUNTER — OFFICE VISIT (OUTPATIENT)
Dept: CARDIOLOGY CLINIC | Age: 87
End: 2022-05-06
Payer: MEDICARE

## 2022-05-06 VITALS — HEIGHT: 67 IN | HEART RATE: 65 BPM | BODY MASS INDEX: 24.01 KG/M2 | OXYGEN SATURATION: 97 % | WEIGHT: 153 LBS

## 2022-05-06 DIAGNOSIS — R00.2 PALPITATIONS: Primary | ICD-10-CM

## 2022-05-06 DIAGNOSIS — I11.9 BENIGN HYPERTENSIVE HEART DISEASE WITHOUT HEART FAILURE: ICD-10-CM

## 2022-05-06 DIAGNOSIS — Z78.9 INTOLERANCE OF DRUG: ICD-10-CM

## 2022-05-06 PROCEDURE — 1101F PT FALLS ASSESS-DOCD LE1/YR: CPT | Performed by: INTERNAL MEDICINE

## 2022-05-06 PROCEDURE — G8427 DOCREV CUR MEDS BY ELIG CLIN: HCPCS | Performed by: INTERNAL MEDICINE

## 2022-05-06 PROCEDURE — 99214 OFFICE O/P EST MOD 30 MIN: CPT | Performed by: INTERNAL MEDICINE

## 2022-05-06 PROCEDURE — 93000 ELECTROCARDIOGRAM COMPLETE: CPT | Performed by: INTERNAL MEDICINE

## 2022-05-06 PROCEDURE — G8510 SCR DEP NEG, NO PLAN REQD: HCPCS | Performed by: INTERNAL MEDICINE

## 2022-05-06 PROCEDURE — G8420 CALC BMI NORM PARAMETERS: HCPCS | Performed by: INTERNAL MEDICINE

## 2022-05-06 PROCEDURE — 1090F PRES/ABSN URINE INCON ASSESS: CPT | Performed by: INTERNAL MEDICINE

## 2022-05-06 PROCEDURE — G8536 NO DOC ELDER MAL SCRN: HCPCS | Performed by: INTERNAL MEDICINE

## 2022-05-06 RX ORDER — BUTALBITAL, ACETAMINOPHEN AND CAFFEINE 50; 325; 40 MG/1; MG/1; MG/1
TABLET ORAL
COMMUNITY
Start: 2022-03-22

## 2022-05-06 RX ORDER — SERTRALINE HYDROCHLORIDE 50 MG/1
50 TABLET, FILM COATED ORAL DAILY
COMMUNITY
Start: 2022-03-22

## 2022-05-06 RX ORDER — FLUTICASONE PROPIONATE 50 MCG
SPRAY, SUSPENSION (ML) NASAL
COMMUNITY
Start: 2022-02-11

## 2022-05-06 RX ORDER — CETIRIZINE HCL 10 MG
10 TABLET ORAL DAILY
COMMUNITY
Start: 2022-02-11

## 2022-05-06 NOTE — PROGRESS NOTES
Tonegiles Gela presents today for   Chief Complaint   Patient presents with    Follow-up     1 year        Connor Smith preferred language for health care discussion is english/other. Is someone accompanying this pt? no    Is the patient using any DME equipment during [address]? [place]     Depression Screening:  3 most recent PHQ Screens 5/6/2022   Little interest or pleasure in doing things Not at all   Feeling down, depressed, irritable, or hopeless Not at all   Total Score PHQ 2 0       Learning Assessment:  Learning Assessment 11/24/2014   PRIMARY LEARNER Patient   HIGHEST LEVEL OF EDUCATION - PRIMARY LEARNER  -   BARRIERS PRIMARY LEARNER -   CO-LEARNER CAREGIVER -   PRIMARY LANGUAGE ENGLISH    NEED -   LEARNER PREFERENCE PRIMARY READING   ANSWERED BY Patient    RELATIONSHIP SELF       Abuse Screening:  Abuse Screening Questionnaire 5/5/2021   Do you ever feel afraid of your partner? N   Are you in a relationship with someone who physically or mentally threatens you? N   Is it safe for you to go home? Y       Fall Risk  Fall Risk Assessment, last 12 mths 5/6/2022   Able to walk? Yes   Fall in past 12 months? 0   Do you feel unsteady? 0   Are you worried about falling 0       Pt currently taking Anticoagulant therapy? ASA 81mg every day     Coordination of Care:  1. Have you been to the ER, urgent care clinic since your last visit? Hospitalized since your last visit? no    2. Have you seen or consulted any other health care providers outside of the [address] [place] [address] since your last visit? Include any pap smears or colon screening.  no

## 2022-05-06 NOTE — PROGRESS NOTES
Kush Centeno    Chief Complaint   Patient presents with    Follow-up     1 year        HPI    Kush Centeno is a 80 y.o. with no known coronary disease here for her routine follow-up care due to history of SVT, PVCs and PACs as well as dyslipidemia with statin intolerance. She was on verapamil in the past, which had to be discontinued because of symptomatic bradycardia with dizziness. Her Holter monitor revealed bradycardia with a heart rate as low as the upper 30s which has improved since verapamil has been discontinued. She has since been on Procardia with good blood pressure control. She has had no anginal chest pain and has a history of a negative stress test in the past. She had a great deal of myalgia from Zocor and finally stopped taking Zocor, with improvement. She saw my nurse practitioner at the end of October of 2016 with palpitations.  At that time, she underwent a Holter monitor, which demonstrated sinus rhythm with occasional PVCs and also PACs.  She had nine runs of supraventricular tachycardia  maximum eight beats in duration.  She has since had no recurrent episodes of palpitations.        Past Medical History:   Diagnosis Date    Cardiac echocardiogram 02/13/2007    [facility]:  EF 65-70%. No WMA. Mild LVH. No significant valve abnormalities.  Cardiac Holter monitor 11/10/2016    Sinus rhythm, avg 71 bpm (range 57-96 bpm). Occasional SVEs (mainly single, w/paired and 9 runs of SVT, max 8 beats).  History of bradycardia     with verapamil    Hypercholesteremia     Hypertension     Palpitations        Past Surgical History:   Procedure Laterality Date    HX CATARACT REMOVAL  2001    bilateral       Current Outpatient Medications   Medication Sig Dispense Refill    butalbital-acetaminophen-caffeine (FIORICET, ESGIC) -40 mg per tablet take 1 tablet by mouth every 12 hours if needed      cetirizine (ZYRTEC) 10 mg tablet Take 10 mg by mouth daily.       fluticasone propionate (FLONASE) 50 mcg/actuation nasal spray instill 1 spray into each nostril twice a day      sertraline (ZOLOFT) 50 mg tablet Take 50 mg by mouth daily.  Procardia XL 60 mg ER tablet TAKE 1 TABLET DAILY 90 Tablet 3    triamterene-hydroCHLOROthiazide (DYAZIDE) 37.5-25 mg per capsule Take 1 capsule by mouth once daily. 90 Capsule 3    aspirin delayed-release 81 mg tablet Take 1 Tab by mouth daily. 30 Tab 5    diclofenac-miSOPROStol (ARTHROTEC 50)  mg-mcg per tablet take 1 tablet by mouth once daily  0       Allergies   Allergen Reactions    Other Medication Other (comments)     Allergy:  Most generic medication (dizziness, presyncope, equilibrium off, mental confusion/dazed feeling)    Verapamil Other (comments)     Symptomatic bradycardia and dizziness    Zetia [Ezetimibe] Other (comments)     Became weak and unable to walk, muscle aches       Social History     Socioeconomic History    Marital status:      Spouse name: Not on file    Number of children: Not on file    Years of education: Not on file    Highest education level: Not on file   Occupational History    Not on file   Tobacco Use    Smoking status: Never Smoker    Smokeless tobacco: Never Used   Substance and Sexual Activity    Alcohol use: Yes     Comment: drinks alcohol in moderation    Drug use: No    Sexual activity: Not on file   Other Topics Concern    Not on file   Social History Narrative    Not on file     Social Determinants of Health     Financial Resource Strain:     Difficulty of Paying Living Expenses: Not on file   Food Insecurity:     Worried About Running Out of Food in the Last Year: Not on file    Dianelys of Food in the Last Year: Not on file   Transportation Needs:     Lack of Transportation (Medical): Not on file    Lack of Transportation (Non-Medical):  Not on file   Physical Activity:     Days of Exercise per Week: Not on file    Minutes of Exercise per Session: Not on file   Stress:     Feeling of Stress : Not on file   Social Connections:     Frequency of Communication with Friends and Family: Not on file    Frequency of Social Gatherings with Friends and Family: Not on file    Attends Confucianism Services: Not on file    Active Member of Clubs or Organizations: Not on file    Attends Club or Organization Meetings: Not on file    Marital Status: Not on file   Intimate Partner Violence:     Fear of Current or Ex-Partner: Not on file    Emotionally Abused: Not on file    Physically Abused: Not on file    Sexually Abused: Not on file   Housing Stability:     Unable to Pay for Housing in the Last Year: Not on file    Number of [place] in the Last Year: Not on file    Unstable Housing in the Last Year: Not on file    has a son who lives at her house, but does all ADLs on her own    FH: n/a    Review of Systems    14 pt Review of Systems is negative unless otherwise mentioned in the HPI. Wt Readings from Last 3 Encounters:   05/06/22 69.4 kg (153 lb)   05/05/21 69.9 kg (154 lb)   08/06/20 67.1 kg (148 lb)     Temp Readings from Last 3 Encounters:   No data found for Temp     BP Readings from Last 3 Encounters:   05/05/21 130/78   08/06/20 148/64   02/06/20 150/58     Pulse Readings from Last 3 Encounters:   05/06/22 65   05/05/21 74   08/06/20 (!) 58       Physical Exam:    Visit Vitals  Pulse 65   Ht 5' 7\" (1.702 m)   Wt 69.4 kg (153 lb)   SpO2 97%   BMI 23.96 kg/m²      Physical Exam  HENT:      Head: Normocephalic and atraumatic. Eyes:      Pupils: Pupils are equal, round, and reactive to light. Cardiovascular:      Rate and Rhythm: Normal rate and regular rhythm. Heart sounds: Normal heart sounds. No murmur heard. No friction rub. No gallop. Pulmonary:      Effort: Pulmonary effort is normal. No respiratory distress. Breath sounds: Normal breath sounds. No wheezing or rales. Chest:      Chest wall: No tenderness.    Abdominal:      General: Bowel sounds are normal.      Palpations: Abdomen is soft. Musculoskeletal:         General: No tenderness. Skin:     General: Skin is warm and dry. Neurological:      Mental Status: She is alert and oriented to person, place, and time. EKG: unchanged from previous tracings, normal sinus rhythm, nonspecific ST and T waves changes, PAC's noted. No results found for: CHOL, CHOLPOCT, CHOLX, CHLST, CHOLV, HDL, HDLPOC, HDLP, LDL, LDLCPOC, LDLC, DLDLP, VLDLC, VLDL, TGLX, TRIGL, TRIGP, TGLPOCT, CHHD, CHHDX    Impression and Plan:  Estelle Bowden is a 80 y.o. with:    1.) H/o SVT, PACs, PVCs, known/ stable  2.) H/o dyslipidemia, with statin intolerance  3.) No h/o CVA  4.) Normal LV function  5.) HTN, well controlled    1.) Stopped Niacin last time, reduced her ASA and only continued on BP meds  2.) Doing really well from CV standpoint, can RTC yearly for HTN    No new complaints  35 mins    Thank you for allowing me to participate in the care of your patient, please do not hesitate to call with questions or concerns.     [address],    Pascual Pascal, DO

## 2022-05-20 RX ORDER — TRIAMTERENE AND HYDROCHLOROTHIAZIDE 37.5; 25 MG/1; MG/1
CAPSULE ORAL
Qty: 90 CAPSULE | Refills: 3 | OUTPATIENT
Start: 2022-05-20

## 2022-05-27 RX ORDER — TRIAMTERENE AND HYDROCHLOROTHIAZIDE 37.5; 25 MG/1; MG/1
CAPSULE ORAL
Qty: 90 CAPSULE | Refills: 3 | Status: SHIPPED | OUTPATIENT
Start: 2022-05-27

## 2022-08-21 ENCOUNTER — APPOINTMENT (OUTPATIENT)
Dept: GENERAL RADIOLOGY | Age: 87
End: 2022-08-21
Attending: STUDENT IN AN ORGANIZED HEALTH CARE EDUCATION/TRAINING PROGRAM
Payer: MEDICARE

## 2022-08-21 ENCOUNTER — HOSPITAL ENCOUNTER (EMERGENCY)
Age: 87
Discharge: HOME | End: 2022-08-21
Attending: STUDENT IN AN ORGANIZED HEALTH CARE EDUCATION/TRAINING PROGRAM
Payer: MEDICARE

## 2022-08-21 VITALS
OXYGEN SATURATION: 100 % | TEMPERATURE: 98.1 F | WEIGHT: 147 LBS | RESPIRATION RATE: 16 BRPM | HEART RATE: 94 BPM | DIASTOLIC BLOOD PRESSURE: 72 MMHG | SYSTOLIC BLOOD PRESSURE: 165 MMHG | BODY MASS INDEX: 23.02 KG/M2

## 2022-08-21 DIAGNOSIS — M25.559 HIP PAIN: Primary | ICD-10-CM

## 2022-08-21 PROCEDURE — 74011250636 HC RX REV CODE- 250/636: Performed by: STUDENT IN AN ORGANIZED HEALTH CARE EDUCATION/TRAINING PROGRAM

## 2022-08-21 PROCEDURE — 96372 THER/PROPH/DIAG INJ SC/IM: CPT

## 2022-08-21 PROCEDURE — 74011250637 HC RX REV CODE- 250/637: Performed by: STUDENT IN AN ORGANIZED HEALTH CARE EDUCATION/TRAINING PROGRAM

## 2022-08-21 PROCEDURE — 99284 EMERGENCY DEPT VISIT MOD MDM: CPT

## 2022-08-21 PROCEDURE — 74011000250 HC RX REV CODE- 250: Performed by: STUDENT IN AN ORGANIZED HEALTH CARE EDUCATION/TRAINING PROGRAM

## 2022-08-21 PROCEDURE — 73502 X-RAY EXAM HIP UNI 2-3 VIEWS: CPT

## 2022-08-21 RX ORDER — KETOROLAC TROMETHAMINE 15 MG/ML
15 INJECTION, SOLUTION INTRAMUSCULAR; INTRAVENOUS ONCE
Status: DISCONTINUED | OUTPATIENT
Start: 2022-08-21 | End: 2022-08-21

## 2022-08-21 RX ORDER — OXYCODONE AND ACETAMINOPHEN 5; 325 MG/1; MG/1
1 TABLET ORAL
Status: COMPLETED | OUTPATIENT
Start: 2022-08-21 | End: 2022-08-21

## 2022-08-21 RX ORDER — KETOROLAC TROMETHAMINE 15 MG/ML
15 INJECTION, SOLUTION INTRAMUSCULAR; INTRAVENOUS ONCE
Status: COMPLETED | OUTPATIENT
Start: 2022-08-21 | End: 2022-08-21

## 2022-08-21 RX ORDER — LIDOCAINE 4 G/100G
1 PATCH TOPICAL EVERY 24 HOURS
Status: DISCONTINUED | OUTPATIENT
Start: 2022-08-21 | End: 2022-08-21 | Stop reason: HOSPADM

## 2022-08-21 RX ORDER — DICLOFENAC SODIUM 10 MG/G
2 GEL TOPICAL 4 TIMES DAILY
Qty: 1 EACH | Refills: 0 | Status: SHIPPED | OUTPATIENT
Start: 2022-08-21

## 2022-08-21 RX ORDER — OXYCODONE AND ACETAMINOPHEN 5; 325 MG/1; MG/1
1 TABLET ORAL
Qty: 12 TABLET | Refills: 0 | Status: SHIPPED | OUTPATIENT
Start: 2022-08-21 | End: 2022-08-24

## 2022-08-21 RX ADMIN — OXYCODONE HYDROCHLORIDE AND ACETAMINOPHEN 1 TABLET: 5; 325 TABLET ORAL at 12:37

## 2022-08-21 RX ADMIN — KETOROLAC TROMETHAMINE 15 MG: 15 INJECTION, SOLUTION INTRAMUSCULAR; INTRAVENOUS at 10:10

## 2022-08-21 NOTE — ED NOTES
Purposeful rounding completed:    Side rails up x 1:  YES  Bed in low position and wheels locked: YES  Call bell within reach: YES  Comfort addressed: YES    Toileting needs addressed: YES  Plan of care reviewed/updated with patient and or family members: YES  IV site assessed: YES  Pain assessed and addressed: YES, 0    Patient laying in bed, eyes open, watching TV. No respiratory distress observed.

## 2022-08-21 NOTE — ED TRIAGE NOTES
\"I woke up around 0400 this morning with sever left hip pain. \" Patient denies trauma, or recent fall.

## 2022-08-21 NOTE — ED PROVIDER NOTES
EMERGENCY DEPARTMENT HISTORY AND PHYSICAL EXAM    9:59 AM      Date: 8/21/2022  Patient Name: Enedelia Roberto    History of Presenting Illness     Chief Complaint   Patient presents with    Hip Pain         History Provided By: Patient  Location/Duration/Severity/Modifying factors   Patient is a 60-year-old female with history of hypertension and osteoarthritis presenting due to left hip pain. Patient states that she woke up at around 4 AM this morning with pain in her left hip. Patient denies any trauma to the area. Patient states that she took some Tylenol, tried a hot pack and also tried a hot bath but did not help her pain so she presented emergency department. Patient has been able to ambulate without difficulty. Patient also does not have any complaint of decree sensation or strength in her lower extremity. PCP: Rober Eli DO    Current Facility-Administered Medications   Medication Dose Route Frequency Provider Last Rate Last Admin    ketorolac (TORADOL) injection 15 mg  15 mg IntraVENous ONCE Jazzmine Harris MD        lidocaine 4 % patch 1 Patch  1 Patch TransDERmal Q24H Jazzmine Harris MD         Current Outpatient Medications   Medication Sig Dispense Refill    triamterene-hydroCHLOROthiazide (DYAZIDE) 37.5-25 mg per capsule Take 1 capsule by mouth once daily. 90 Capsule 3    butalbital-acetaminophen-caffeine (FIORICET, ESGIC) -40 mg per tablet take 1 tablet by mouth every 12 hours if needed      cetirizine (ZYRTEC) 10 mg tablet Take 10 mg by mouth daily. fluticasone propionate (FLONASE) 50 mcg/actuation nasal spray instill 1 spray into each nostril twice a day      sertraline (ZOLOFT) 50 mg tablet Take 50 mg by mouth daily. Procardia XL 60 mg ER tablet TAKE 1 TABLET DAILY 90 Tablet 3    aspirin delayed-release 81 mg tablet Take 1 Tab by mouth daily.  30 Tab 5    diclofenac-miSOPROStol (ARTHROTEC 50)  mg-mcg per tablet take 1 tablet by mouth once daily  0 Past History     Past Medical History:  Past Medical History:   Diagnosis Date    Cardiac echocardiogram 02/13/2007    [facility]:  EF 65-70%. No WMA. Mild LVH. No significant valve abnormalities. Cardiac Holter monitor 11/10/2016    Sinus rhythm, avg 71 bpm (range 57-96 bpm). Occasional SVEs (mainly single, w/paired and 9 runs of SVT, max 8 beats). History of bradycardia     with verapamil    Hypercholesteremia     Hypertension     Palpitations        Past Surgical History:  Past Surgical History:   Procedure Laterality Date    HX CATARACT REMOVAL  2001    bilateral       Family History:  Family History   Problem Relation Age of Onset    Hypertension Mother     Stroke Mother     Heart Disease Father        Social History:  Social History     Tobacco Use    Smoking status: Never    Smokeless tobacco: Never   Substance Use Topics    Alcohol use: Yes     Comment: drinks alcohol in moderation    Drug use: No       Allergies: Allergies   Allergen Reactions    Other Medication Other (comments)     Allergy:  Most generic medication (dizziness, presyncope, equilibrium off, mental confusion/dazed feeling)    Verapamil Other (comments)     Symptomatic bradycardia and dizziness    Zetia [Ezetimibe] Other (comments)     Became weak and unable to walk, muscle aches         Review of Systems       Review of Systems   Musculoskeletal:         Left hip pain       Physical Exam   Visit Vitals  BP (!) 171/73   Pulse 94   Temp 98.1 °F (36.7 °C)   Resp 16   Wt 66.7 kg (147 lb)   SpO2 100%   BMI 23.02 kg/m²         Physical Exam  Vitals and nursing note reviewed. Constitutional:       General: She is not in acute distress. Appearance: Normal appearance. She is not ill-appearing. HENT:      Head: Normocephalic and atraumatic. Abdominal:      General: Abdomen is flat. Palpations: Abdomen is soft. Tenderness: There is no abdominal tenderness. Musculoskeletal:         General: No swelling.  Normal range of motion. Cervical back: Normal range of motion. Comments: Mild tenderness to left hip no erythema swelling or bruising noted. Patient holds leg internally for comfort but is able to rotate leg externally and move leg in all normal range of motion with full strength to flexion extension at hip knee. Distal pulses including DP and PT palpable normal perfusion in lower extremity. Skin:     General: Skin is warm and dry. Neurological:      General: No focal deficit present. Mental Status: She is alert. Sensory: No sensory deficit. Motor: No weakness. Psychiatric:         Mood and Affect: Mood normal.         Behavior: Behavior normal.         Thought Content: Thought content normal.         Judgment: Judgment normal.         Diagnostic Study Results     Labs -  No results found for this or any previous visit (from the past 12 hour(s)). Radiologic Studies -   XR PELV 3 V    (Results Pending)   XR HIP LT W OR WO PELV 2-3 VWS    (Results Pending)         Medical Decision Making   I am the first provider for this patient. I reviewed the vital signs, available nursing notes, past medical history, past surgical history, family history and social history. Vital Signs-Reviewed the patient's vital signs. EKG:     Records Reviewed: Nursing Notes (Time of Review: 9:59 AM)    ED Course: Progress Notes, Reevaluation, and Consults:         Provider Notes (Medical Decision Making):   MDM  Number of Diagnoses or Management Options  Diagnosis management comments: Patient is a 51-year-old female with history of hypertension and osteoarthritis presenting due to left hip pain.   Patient presenting with symptoms that seem to be musculoskeletal in nature possibly worsening of her known osteoarthritis, less likely fracture dislocation without any trauma but will get x-ray imaging to assess, do not believe any vascular necrosis or vascular insufficiency/ischemia as patient has normal perfusion and palpable pulses in left extremity and only with focal tenderness to the left hip. We will try to treat symptoms with IM Toradol and lidocaine patch. If this does not work then we will attempt a trial of p.o. Percocet. Since patient able to ambulate without difficulty and has full strength in her lower extremity likely will discharge home. Procedures    Critical Care Time: none      Diagnosis     Clinical Impression: No diagnosis found. Disposition: Home    Follow-up Information    None          Patient's Medications   Start Taking    No medications on file   Continue Taking    ASPIRIN DELAYED-RELEASE 81 MG TABLET    Take 1 Tab by mouth daily. BUTALBITAL-ACETAMINOPHEN-CAFFEINE (FIORICET, ESGIC) -40 MG PER TABLET    take 1 tablet by mouth every 12 hours if needed    CETIRIZINE (ZYRTEC) 10 MG TABLET    Take 10 mg by mouth daily. DICLOFENAC-MISOPROSTOL (ARTHROTEC 50)  MG-MCG PER TABLET    take 1 tablet by mouth once daily    FLUTICASONE PROPIONATE (FLONASE) 50 MCG/ACTUATION NASAL SPRAY    instill 1 spray into each nostril twice a day    PROCARDIA XL 60 MG ER TABLET    TAKE 1 TABLET DAILY    SERTRALINE (ZOLOFT) 50 MG TABLET    Take 50 mg by mouth daily. TRIAMTERENE-HYDROCHLOROTHIAZIDE (DYAZIDE) 37.5-25 MG PER CAPSULE    Take 1 capsule by mouth once daily. These Medications have changed    No medications on file   Stop Taking    No medications on file     Disclaimer: Sections of this note are dictated using utilizing voice recognition software. Minor typographical errors may be present. If questions arise, please do not hesitate to contact me or call our department.

## 2022-08-21 NOTE — DISCHARGE INSTRUCTIONS
Please take medications as prescribed. Please return to the ER with any new or worsening symptoms or any other concerns. Please return to the Emergency Department if you develop a fever, chills, cannot eat or drink due to nausea or vomiting, or if any of your symptoms worsen. Also, It is extremely important that you follow-up with a primary care physician and if you do not have one currently use the contact information provided to obtain an appointment. If none was provided please call the number on the back of your insurance card to locate a Primary care doctor. Many offices have \"cancellation lists\" that you can ask to be placed on; should a patient with an earlier appointment cancel you will be notified to take their place. Please return to the Emergency Room immediately if your symptoms worsen. Please carefully read all discharge instructions    [email or web address]    What are GoodRx coupons? GoodRx coupons will help you pay less than the cash price for your prescription. [employer] free to use and are accepted at virtually every [place] pharmacy.   Your pharmacist will know how to enter the codes on the coupon to pull up the lowest discount available

## 2022-10-26 RX ORDER — NIFEDIPINE 60 MG
TABLET, EXTENDED RELEASE 24 HR ORAL
Qty: 90 TABLET | Refills: 3 | Status: SHIPPED | OUTPATIENT
Start: 2022-10-26

## 2023-05-05 ENCOUNTER — OFFICE VISIT (OUTPATIENT)
Age: 88
End: 2023-05-05

## 2023-05-05 VITALS
BODY MASS INDEX: 24.33 KG/M2 | SYSTOLIC BLOOD PRESSURE: 122 MMHG | HEART RATE: 63 BPM | OXYGEN SATURATION: 98 % | DIASTOLIC BLOOD PRESSURE: 68 MMHG | HEIGHT: 67 IN | WEIGHT: 155 LBS

## 2023-05-05 DIAGNOSIS — I11.9 HYPERTENSIVE HEART DISEASE WITHOUT HEART FAILURE: ICD-10-CM

## 2023-05-05 DIAGNOSIS — Z78.9 OTHER SPECIFIED HEALTH STATUS: ICD-10-CM

## 2023-05-05 DIAGNOSIS — R00.2 PALPITATIONS: Primary | ICD-10-CM

## 2023-05-05 RX ORDER — CYCLOBENZAPRINE HCL 5 MG
5 TABLET ORAL EVERY 12 HOURS
COMMUNITY
Start: 2023-03-02

## 2023-05-05 ASSESSMENT — PATIENT HEALTH QUESTIONNAIRE - PHQ9
SUM OF ALL RESPONSES TO PHQ QUESTIONS 1-9: 0
2. FEELING DOWN, DEPRESSED OR HOPELESS: 0
1. LITTLE INTEREST OR PLEASURE IN DOING THINGS: 0
SUM OF ALL RESPONSES TO PHQ9 QUESTIONS 1 & 2: 0
SUM OF ALL RESPONSES TO PHQ QUESTIONS 1-9: 0

## 2023-05-05 NOTE — PROGRESS NOTES
Carley Fitzpatrick    Chief Complaint   Patient presents with    Follow-up     1 year        HPI    Carley Fitzpatrick is a 80 y.o. with no known coronary disease here for her routine follow-up care due to history of SVT, PVCs and PACs as well as dyslipidemia with statin intolerance. She was on verapamil in the past, which had to be discontinued because of symptomatic bradycardia with dizziness. Her Holter monitor revealed bradycardia with a heart rate as low as the upper 30s which has improved since verapamil has been discontinued. She has since been on Procardia with good blood pressure control. She has had no anginal chest pain and has a history of a negative stress test in the past. She had a great deal of myalgia from Zocor and finally stopped taking Zocor, with improvement. She saw my nurse practitioner at the end of October of 2016 with palpitations. At that time, she underwent a Holter monitor, which demonstrated sinus rhythm with occasional PVCs and also PACs. She had nine runs of supraventricular tachycardia - maximum eight beats in duration. She has since had no recurrent episodes of palpitations. Past Medical History:   Diagnosis Date    Cardiac echocardiogram 02/13/2007    [facility]:  EF 65-70%. No WMA. Mild LVH. No significant valve abnormalities. Cardiac Holter monitor 11/10/2016    Sinus rhythm, avg 71 bpm (range 57-96 bpm). Occasional SVEs (mainly single, w/paired and 9 runs of SVT, max 8 beats). History of bradycardia     with verapamil    Hypercholesteremia     Hypertension     Palpitations        Past Surgical History:   Procedure Laterality Date    HX CATARACT REMOVAL  2001    bilateral       Current Outpatient Medications   Medication Sig Dispense Refill    butalbital-acetaminophen-caffeine (FIORICET, ESGIC) -40 mg per tablet take 1 tablet by mouth every 12 hours if needed      cetirizine (ZYRTEC) 10 mg tablet Take 10 mg by mouth daily.       fluticasone

## 2023-05-05 NOTE — PROGRESS NOTES
Trisha Rivero presents today for   Chief Complaint   Patient presents with    1 Year Follow Up     1 year HTN f/u     Shortness of Breath     Sob with exertion        Yee Morgan preferred language for health care discussion is english/other. Is someone accompanying this pt? no    Is the patient using any DME equipment during [address]? yes    Depression Screening:  Depression: Not at risk    PHQ-2 Score: 0        Learning Assessment:  Who is the primary learner? Patient    What is the preferred language for health care of the primary learner? ENGLISH    How does the primary learner prefer to learn new concepts? DEMONSTRATION    Answered By patient    Relationship to Learner SELF           Pt currently taking Anticoagulant therapy? no    Pt currently taking Antiplatelet therapy ? ASA 81 mg 1x daily       Coordination of Care:  1. Have you been to the ER, urgent care clinic since your last visit? Hospitalized since your last visit? no    2. Have you seen or consulted any other health care providers outside of the [address] [place] [address] since your last visit? Include any pap smears or colon screening.  no

## 2023-05-09 RX ORDER — TRIAMTERENE AND HYDROCHLOROTHIAZIDE 37.5; 25 MG/1; MG/1
CAPSULE ORAL
Qty: 90 CAPSULE | Refills: 3 | Status: SHIPPED | OUTPATIENT
Start: 2023-05-09

## 2023-11-30 ENCOUNTER — TRANSCRIBE ORDERS (OUTPATIENT)
Facility: HOSPITAL | Age: 88
End: 2023-11-30

## 2023-11-30 ENCOUNTER — HOSPITAL ENCOUNTER (OUTPATIENT)
Facility: HOSPITAL | Age: 88
Discharge: HOME | End: 2023-11-30
Payer: MEDICARE

## 2023-11-30 DIAGNOSIS — M13.0 POLYARTHRITIS: ICD-10-CM

## 2023-11-30 DIAGNOSIS — M79.609 PAIN OF MULTIPLE EXTREMITIES: ICD-10-CM

## 2023-11-30 DIAGNOSIS — M13.0 POLYARTHRITIS: Primary | ICD-10-CM

## 2023-11-30 LAB
ALBUMIN SERPL-MCNC: 3.6 G/DL (ref 3.4–5)
ALBUMIN/GLOB SERPL: 1.1 (ref 0.8–1.7)
ALP SERPL-CCNC: 109 U/L (ref 45–117)
ALT SERPL-CCNC: 27 U/L (ref 13–56)
ANION GAP SERPL CALC-SCNC: 5 MMOL/L (ref 3–18)
APPEARANCE UR: CLEAR
AST SERPL-CCNC: 23 U/L (ref 10–38)
BACTERIA URNS QL MICRO: ABNORMAL /HPF
BASOPHILS # BLD: 0.1 K/UL (ref 0–0.1)
BASOPHILS NFR BLD: 1 % (ref 0–2)
BILIRUB SERPL-MCNC: 0.4 MG/DL (ref 0.2–1)
BILIRUB UR QL: NEGATIVE
BUN SERPL-MCNC: 22 MG/DL (ref 7–18)
BUN/CREAT SERPL: 19 (ref 12–20)
CALCIUM SERPL-MCNC: 8.9 MG/DL (ref 8.5–10.1)
CHLORIDE SERPL-SCNC: 103 MMOL/L (ref 100–111)
CK SERPL-CCNC: 90 U/L (ref 26–192)
CO2 SERPL-SCNC: 28 MMOL/L (ref 21–32)
COLOR UR: YELLOW
CREAT SERPL-MCNC: 1.15 MG/DL (ref 0.6–1.3)
CRP SERPL-MCNC: 0.4 MG/DL (ref 0–0.3)
DIFFERENTIAL METHOD BLD: ABNORMAL
EOSINOPHIL # BLD: 0.2 K/UL (ref 0–0.4)
EOSINOPHIL NFR BLD: 3 % (ref 0–5)
EPITH CASTS URNS QL MICRO: ABNORMAL /LPF (ref 0–5)
ERYTHROCYTE [DISTWIDTH] IN BLOOD BY AUTOMATED COUNT: 15 % (ref 11.6–14.5)
ERYTHROCYTE [SEDIMENTATION RATE] IN BLOOD: 16 MM/HR (ref 0–30)
GLOBULIN SER CALC-MCNC: 3.4 G/DL (ref 2–4)
GLUCOSE SERPL-MCNC: 113 MG/DL (ref 74–99)
GLUCOSE UR STRIP.AUTO-MCNC: NEGATIVE MG/DL
HCT VFR BLD AUTO: 37.3 % (ref 35–45)
HGB BLD-MCNC: 12.9 G/DL (ref 12–16)
HGB UR QL STRIP: NEGATIVE
IMM GRANULOCYTES # BLD AUTO: 0 K/UL (ref 0–0.04)
IMM GRANULOCYTES NFR BLD AUTO: 0 % (ref 0–0.5)
KETONES UR QL STRIP.AUTO: NEGATIVE MG/DL
LEUKOCYTE ESTERASE UR QL STRIP.AUTO: ABNORMAL
LYMPHOCYTES # BLD: 3 K/UL (ref 0.9–3.6)
LYMPHOCYTES NFR BLD: 42 % (ref 21–52)
MCH RBC QN AUTO: 34 PG (ref 24–34)
MCHC RBC AUTO-ENTMCNC: 34.6 G/DL (ref 31–37)
MCV RBC AUTO: 98.4 FL (ref 78–100)
MONOCYTES # BLD: 0.7 K/UL (ref 0.05–1.2)
MONOCYTES NFR BLD: 10 % (ref 3–10)
NEUTS SEG # BLD: 3.2 K/UL (ref 1.8–8)
NEUTS SEG NFR BLD: 44 % (ref 40–73)
NITRITE UR QL STRIP.AUTO: NEGATIVE
NRBC # BLD: 0 K/UL (ref 0–0.01)
NRBC BLD-RTO: 0 PER 100 WBC
PH UR STRIP: 6.5 (ref 5–8)
PLATELET # BLD AUTO: 278 K/UL (ref 135–420)
PMV BLD AUTO: 10.4 FL (ref 9.2–11.8)
POTASSIUM SERPL-SCNC: 4.3 MMOL/L (ref 3.5–5.5)
PROT SERPL-MCNC: 7 G/DL (ref 6.4–8.2)
PROT UR STRIP-MCNC: NEGATIVE MG/DL
RBC # BLD AUTO: 3.79 M/UL (ref 4.2–5.3)
RBC #/AREA URNS HPF: NEGATIVE /HPF (ref 0–5)
SODIUM SERPL-SCNC: 136 MMOL/L (ref 136–145)
SP GR UR REFRACTOMETRY: 1.01 (ref 1–1.03)
UROBILINOGEN UR QL STRIP.AUTO: 0.2 EU/DL (ref 0.2–1)
WBC # BLD AUTO: 7.1 K/UL (ref 4.6–13.2)
WBC URNS QL MICRO: ABNORMAL /HPF (ref 0–4)

## 2023-11-30 PROCEDURE — 86140 C-REACTIVE PROTEIN: CPT

## 2023-11-30 PROCEDURE — 82550 ASSAY OF CK (CPK): CPT

## 2023-11-30 PROCEDURE — 81003 URINALYSIS AUTO W/O SCOPE: CPT

## 2023-11-30 PROCEDURE — 80053 COMPREHEN METABOLIC PANEL: CPT

## 2023-11-30 PROCEDURE — 81015 MICROSCOPIC EXAM OF URINE: CPT

## 2023-11-30 PROCEDURE — 85025 COMPLETE CBC W/AUTO DIFF WBC: CPT

## 2023-11-30 PROCEDURE — 36415 COLL VENOUS BLD VENIPUNCTURE: CPT

## 2023-11-30 PROCEDURE — 85652 RBC SED RATE AUTOMATED: CPT

## 2024-05-10 ENCOUNTER — OFFICE VISIT (OUTPATIENT)
Age: 89
End: 2024-05-10
Payer: MEDICARE

## 2024-05-10 VITALS
HEART RATE: 67 BPM | WEIGHT: 157 LBS | DIASTOLIC BLOOD PRESSURE: 72 MMHG | BODY MASS INDEX: 24.64 KG/M2 | OXYGEN SATURATION: 98 % | SYSTOLIC BLOOD PRESSURE: 152 MMHG | HEIGHT: 67 IN

## 2024-05-10 DIAGNOSIS — R00.2 PALPITATIONS: ICD-10-CM

## 2024-05-10 DIAGNOSIS — I11.9 HYPERTENSIVE HEART DISEASE WITHOUT HEART FAILURE: Primary | ICD-10-CM

## 2024-05-10 DIAGNOSIS — Z78.9 OTHER SPECIFIED HEALTH STATUS: ICD-10-CM

## 2024-05-10 PROCEDURE — 99214 OFFICE O/P EST MOD 30 MIN: CPT | Performed by: INTERNAL MEDICINE

## 2024-05-10 PROCEDURE — G8428 CUR MEDS NOT DOCUMENT: HCPCS | Performed by: INTERNAL MEDICINE

## 2024-05-10 PROCEDURE — 1123F ACP DISCUSS/DSCN MKR DOCD: CPT | Performed by: INTERNAL MEDICINE

## 2024-05-10 PROCEDURE — G8420 CALC BMI NORM PARAMETERS: HCPCS | Performed by: INTERNAL MEDICINE

## 2024-05-10 PROCEDURE — 1090F PRES/ABSN URINE INCON ASSESS: CPT | Performed by: INTERNAL MEDICINE

## 2024-05-10 PROCEDURE — 1036F TOBACCO NON-USER: CPT | Performed by: INTERNAL MEDICINE

## 2024-05-10 PROCEDURE — 93000 ELECTROCARDIOGRAM COMPLETE: CPT | Performed by: INTERNAL MEDICINE

## 2024-05-10 RX ORDER — LEVOTHYROXINE SODIUM 0.05 MG/1
50 TABLET ORAL DAILY
COMMUNITY
Start: 2024-02-16

## 2024-05-10 ASSESSMENT — PATIENT HEALTH QUESTIONNAIRE - PHQ9
SUM OF ALL RESPONSES TO PHQ QUESTIONS 1-9: 0
1. LITTLE INTEREST OR PLEASURE IN DOING THINGS: NOT AT ALL
2. FEELING DOWN, DEPRESSED OR HOPELESS: NOT AT ALL
SUM OF ALL RESPONSES TO PHQ QUESTIONS 1-9: 0
SUM OF ALL RESPONSES TO PHQ QUESTIONS 1-9: 0
SUM OF ALL RESPONSES TO PHQ9 QUESTIONS 1 & 2: 0
SUM OF ALL RESPONSES TO PHQ QUESTIONS 1-9: 0

## 2024-05-10 NOTE — PROGRESS NOTES
Yee Alegria    Chief Complaint   Patient presents with    Follow-up     1 year        HPI    Yee Alegria is a 89 y.o. with no known coronary disease here for her routine follow-up care due to history of SVT, PVCs and PACs as well as dyslipidemia with statin intolerance.    She was on verapamil in the past, which had to be discontinued because of symptomatic bradycardia with dizziness. Her Holter monitor revealed bradycardia with a heart rate as low as the upper 30s which has improved since verapamil has been discontinued. She has since been on Procardia with good blood pressure control. She has had no anginal chest pain and has a history of a negative stress test in the past. She had a great deal of myalgia from Zocor and finally stopped taking Zocor, with improvement.  She saw my nurse practitioner at the end of October of 2016 with palpitations.  At that time, she underwent a Holter monitor, which demonstrated sinus rhythm with occasional PVCs and also PACs.  She had nine runs of supraventricular tachycardia - maximum eight beats in duration.  She has since had no recurrent episodes of palpitations.        Past Medical History:   Diagnosis Date    Cardiac echocardiogram 02/13/2007    [facility]:  EF 65-70%.  No WMA.  Mild LVH.  No significant valve abnormalities.    Cardiac Holter monitor 11/10/2016    Sinus rhythm, avg 71 bpm (range 57-96 bpm).  Occasional SVEs (mainly single, w/paired and 9 runs of SVT, max 8 beats).      History of bradycardia     with verapamil    Hypercholesteremia     Hypertension     Palpitations        Past Surgical History:   Procedure Laterality Date    HX CATARACT REMOVAL  2001    bilateral       Current Outpatient Medications   Medication Sig Dispense Refill    butalbital-acetaminophen-caffeine (FIORICET, ESGIC) -40 mg per tablet take 1 tablet by mouth every 12 hours if needed      cetirizine (ZYRTEC) 10 mg tablet Take 10 mg by mouth daily.      fluticasone

## 2025-04-25 ENCOUNTER — APPOINTMENT (OUTPATIENT)
Facility: HOSPITAL | Age: 89
End: 2025-04-25
Payer: MEDICARE

## 2025-04-25 ENCOUNTER — HOSPITAL ENCOUNTER (EMERGENCY)
Facility: HOSPITAL | Age: 89
Discharge: HOME | End: 2025-04-25
Payer: MEDICARE

## 2025-04-25 VITALS
DIASTOLIC BLOOD PRESSURE: 56 MMHG | HEART RATE: 86 BPM | HEIGHT: 67 IN | BODY MASS INDEX: 23.07 KG/M2 | RESPIRATION RATE: 18 BRPM | SYSTOLIC BLOOD PRESSURE: 134 MMHG | TEMPERATURE: 98.9 F | WEIGHT: 147 LBS | OXYGEN SATURATION: 99 %

## 2025-04-25 DIAGNOSIS — M54.50 ACUTE LOW BACK PAIN, UNSPECIFIED BACK PAIN LATERALITY, UNSPECIFIED WHETHER SCIATICA PRESENT: Primary | ICD-10-CM

## 2025-04-25 PROCEDURE — 99284 EMERGENCY DEPT VISIT MOD MDM: CPT

## 2025-04-25 PROCEDURE — 6370000000 HC RX 637 (ALT 250 FOR IP)

## 2025-04-25 PROCEDURE — 72110 X-RAY EXAM L-2 SPINE 4/>VWS: CPT

## 2025-04-25 PROCEDURE — 73700 CT LOWER EXTREMITY W/O DYE: CPT

## 2025-04-25 RX ORDER — HYDROCODONE BITARTRATE AND ACETAMINOPHEN 5; 325 MG/1; MG/1
1 TABLET ORAL EVERY 6 HOURS PRN
Qty: 10 TABLET | Refills: 0 | Status: SHIPPED | OUTPATIENT
Start: 2025-04-25 | End: 2025-04-28

## 2025-04-25 RX ORDER — DIAZEPAM 2 MG/1
2 TABLET ORAL ONCE
Status: DISCONTINUED | OUTPATIENT
Start: 2025-04-25 | End: 2025-04-25

## 2025-04-25 RX ORDER — DIAZEPAM 2 MG/1
2 TABLET ORAL EVERY 12 HOURS PRN
Qty: 10 TABLET | Refills: 0 | Status: SHIPPED | OUTPATIENT
Start: 2025-04-25 | End: 2025-04-30

## 2025-04-25 RX ORDER — DIAZEPAM 5 MG/1
2.5 TABLET ORAL ONCE
Status: COMPLETED | OUTPATIENT
Start: 2025-04-25 | End: 2025-04-25

## 2025-04-25 RX ORDER — LIDOCAINE 50 MG/G
1 PATCH TOPICAL DAILY
Qty: 10 PATCH | Refills: 0 | Status: ON HOLD | OUTPATIENT
Start: 2025-04-25 | End: 2025-05-05

## 2025-04-25 RX ORDER — IBUPROFEN 400 MG/1
400 TABLET, FILM COATED ORAL EVERY 8 HOURS PRN
Qty: 20 TABLET | Refills: 0 | Status: ON HOLD | OUTPATIENT
Start: 2025-04-25

## 2025-04-25 RX ORDER — IBUPROFEN 400 MG/1
400 TABLET, FILM COATED ORAL
Status: COMPLETED | OUTPATIENT
Start: 2025-04-25 | End: 2025-04-25

## 2025-04-25 RX ORDER — LIDOCAINE 4 G/G
1 PATCH TOPICAL
Status: DISCONTINUED | OUTPATIENT
Start: 2025-04-25 | End: 2025-04-25 | Stop reason: HOSPADM

## 2025-04-25 RX ORDER — HYDROCODONE BITARTRATE AND ACETAMINOPHEN 5; 325 MG/1; MG/1
1 TABLET ORAL
Refills: 0 | Status: COMPLETED | OUTPATIENT
Start: 2025-04-25 | End: 2025-04-25

## 2025-04-25 RX ADMIN — HYDROCODONE BITARTRATE AND ACETAMINOPHEN 1 TABLET: 5; 325 TABLET ORAL at 14:23

## 2025-04-25 RX ADMIN — DIAZEPAM 2.5 MG: 5 TABLET ORAL at 14:23

## 2025-04-25 RX ADMIN — IBUPROFEN 400 MG: 400 TABLET, FILM COATED ORAL at 14:23

## 2025-04-25 ASSESSMENT — PAIN SCALES - GENERAL: PAINLEVEL_OUTOF10: 9

## 2025-04-25 NOTE — ED TRIAGE NOTES
Pt in ED with c/o right hip pain, right leg pain, and back pain onset 3 days. Pt states she was fine when she went to bed and when she got up the next morning she couldn't walk

## 2025-04-26 ASSESSMENT — ENCOUNTER SYMPTOMS
NAUSEA: 0
FACIAL SWELLING: 0
VOMITING: 0
CHOKING: 0
ABDOMINAL PAIN: 0
COUGH: 0
CHEST TIGHTNESS: 0
SINUS PAIN: 0
SHORTNESS OF BREATH: 0
BACK PAIN: 1
DIARRHEA: 0
WHEEZING: 0

## 2025-05-02 ENCOUNTER — HOSPITAL ENCOUNTER (INPATIENT)
Facility: HOSPITAL | Age: 89
LOS: 11 days | Discharge: NURSING, REHAB OR LONG-TERM CARE | DRG: 871 | End: 2025-05-13
Attending: EMERGENCY MEDICINE | Admitting: STUDENT IN AN ORGANIZED HEALTH CARE EDUCATION/TRAINING PROGRAM
Payer: MEDICARE

## 2025-05-02 ENCOUNTER — APPOINTMENT (OUTPATIENT)
Facility: HOSPITAL | Age: 89
DRG: 871 | End: 2025-05-02
Payer: MEDICARE

## 2025-05-02 DIAGNOSIS — E03.9 HYPOTHYROIDISM, UNSPECIFIED TYPE: ICD-10-CM

## 2025-05-02 DIAGNOSIS — I21.4 NSTEMI (NON-ST ELEVATED MYOCARDIAL INFARCTION) (HCC): ICD-10-CM

## 2025-05-02 DIAGNOSIS — E83.51 HYPOCALCEMIA: ICD-10-CM

## 2025-05-02 DIAGNOSIS — I24.9 ACUTE CORONARY SYNDROME (HCC): ICD-10-CM

## 2025-05-02 DIAGNOSIS — M48.00 CENTRAL STENOSIS OF SPINAL CANAL: ICD-10-CM

## 2025-05-02 DIAGNOSIS — D72.829 LEUKOCYTOSIS, UNSPECIFIED TYPE: ICD-10-CM

## 2025-05-02 DIAGNOSIS — N17.9 AKI (ACUTE KIDNEY INJURY): ICD-10-CM

## 2025-05-02 DIAGNOSIS — I48.91 ATRIAL FIBRILLATION WITH RAPID VENTRICULAR RESPONSE (HCC): ICD-10-CM

## 2025-05-02 DIAGNOSIS — R33.8 ACUTE URINARY RETENTION: ICD-10-CM

## 2025-05-02 DIAGNOSIS — A41.9 SEPSIS, DUE TO UNSPECIFIED ORGANISM, UNSPECIFIED WHETHER ACUTE ORGAN DYSFUNCTION PRESENT (HCC): Primary | ICD-10-CM

## 2025-05-02 DIAGNOSIS — Z51.5 HOSPICE CARE: ICD-10-CM

## 2025-05-02 LAB
ALBUMIN SERPL-MCNC: 2 G/DL (ref 3.4–5)
ALBUMIN/GLOB SERPL: 0.5 (ref 0.8–1.7)
ALP SERPL-CCNC: 236 U/L (ref 45–117)
ALT SERPL-CCNC: 23 U/L (ref 10–35)
ANION GAP BLD CALC-SCNC: ABNORMAL MMOL/L (ref 10–20)
ANION GAP SERPL CALC-SCNC: 26 MMOL/L (ref 3–18)
ANION GAP SERPL CALC-SCNC: 26 MMOL/L (ref 3–18)
APPEARANCE UR: ABNORMAL
APTT PPP: 30.5 SEC (ref 23–36.4)
APTT PPP: 67.1 SEC (ref 23–36.4)
AST SERPL-CCNC: 35 U/L (ref 10–38)
B PERT DNA SPEC QL NAA+PROBE: NOT DETECTED
BACTERIA URNS QL MICRO: ABNORMAL /HPF
BASE DEFICIT BLD-SCNC: 15.1 MMOL/L
BASOPHILS # BLD: 0 K/UL (ref 0–0.1)
BASOPHILS NFR BLD: 0 % (ref 0–2)
BILIRUB DIRECT SERPL-MCNC: <0.2 MG/DL (ref 0–0.2)
BILIRUB SERPL-MCNC: 0.3 MG/DL (ref 0.2–1)
BILIRUB UR QL: NEGATIVE
BORDETELLA PARAPERTUSSIS BY PCR: NOT DETECTED
BUN SERPL-MCNC: 69 MG/DL (ref 6–23)
BUN SERPL-MCNC: 69 MG/DL (ref 6–23)
BUN/CREAT SERPL: 22 (ref 12–20)
BUN/CREAT SERPL: 23 (ref 12–20)
C PNEUM DNA SPEC QL NAA+PROBE: NOT DETECTED
CA-I BLD-MCNC: 0.97 MMOL/L (ref 1.15–1.33)
CALCIUM SERPL-MCNC: 8.2 MG/DL (ref 8.5–10.1)
CALCIUM SERPL-MCNC: 8.2 MG/DL (ref 8.5–10.1)
CHLORIDE BLD-SCNC: 105 MMOL/L (ref 98–107)
CHLORIDE SERPL-SCNC: 92 MMOL/L (ref 98–107)
CHLORIDE SERPL-SCNC: 93 MMOL/L (ref 98–107)
CK SERPL-CCNC: 88 U/L (ref 26–192)
CO2 BLD-SCNC: 10 MMOL/L (ref 22–29)
CO2 SERPL-SCNC: 10 MMOL/L (ref 21–32)
CO2 SERPL-SCNC: 11 MMOL/L (ref 21–32)
COLOR UR: YELLOW
CREAT BLD-MCNC: 3.39 MG/DL (ref 0.6–1.3)
CREAT SERPL-MCNC: 3.07 MG/DL (ref 0.6–1.3)
CREAT SERPL-MCNC: 3.08 MG/DL (ref 0.6–1.3)
DIFFERENTIAL METHOD BLD: ABNORMAL
EKG ATRIAL RATE: 100 BPM
EKG DIAGNOSIS: NORMAL
EKG DIAGNOSIS: NORMAL
EKG P AXIS: 92 DEGREES
EKG P-R INTERVAL: 128 MS
EKG Q-T INTERVAL: 316 MS
EKG Q-T INTERVAL: 356 MS
EKG QRS DURATION: 70 MS
EKG QRS DURATION: 74 MS
EKG QTC CALCULATION (BAZETT): 459 MS
EKG QTC CALCULATION (BAZETT): 500 MS
EKG R AXIS: 43 DEGREES
EKG R AXIS: 6 DEGREES
EKG T AXIS: 75 DEGREES
EKG T AXIS: 96 DEGREES
EKG VENTRICULAR RATE: 100 BPM
EKG VENTRICULAR RATE: 151 BPM
EOSINOPHIL # BLD: 0.29 K/UL (ref 0–0.4)
EOSINOPHIL NFR BLD: 1 % (ref 0–5)
EPITH CASTS URNS QL MICRO: ABNORMAL /LPF (ref 0–5)
ERYTHROCYTE [DISTWIDTH] IN BLOOD BY AUTOMATED COUNT: 15.9 % (ref 11.6–14.5)
FLUAV SUBTYP SPEC NAA+PROBE: NOT DETECTED
FLUBV RNA SPEC QL NAA+PROBE: NOT DETECTED
GLOBULIN SER CALC-MCNC: 3.8 G/DL (ref 2–4)
GLUCOSE BLD-MCNC: 173 MG/DL (ref 74–99)
GLUCOSE SERPL-MCNC: 157 MG/DL (ref 74–108)
GLUCOSE SERPL-MCNC: 161 MG/DL (ref 74–108)
GLUCOSE UR STRIP.AUTO-MCNC: 100 MG/DL
HADV DNA SPEC QL NAA+PROBE: NOT DETECTED
HCO3 BLD-SCNC: 10.3 MMOL/L (ref 21–28)
HCOV 229E RNA SPEC QL NAA+PROBE: NOT DETECTED
HCOV HKU1 RNA SPEC QL NAA+PROBE: NOT DETECTED
HCOV NL63 RNA SPEC QL NAA+PROBE: NOT DETECTED
HCOV OC43 RNA SPEC QL NAA+PROBE: NOT DETECTED
HCT VFR BLD AUTO: 31.7 % (ref 35–45)
HGB BLD-MCNC: 11.3 G/DL (ref 12–16)
HGB UR QL STRIP: NEGATIVE
HMPV RNA SPEC QL NAA+PROBE: NOT DETECTED
HPIV1 RNA SPEC QL NAA+PROBE: NOT DETECTED
HPIV2 RNA SPEC QL NAA+PROBE: NOT DETECTED
HPIV3 RNA SPEC QL NAA+PROBE: NOT DETECTED
HPIV4 RNA SPEC QL NAA+PROBE: NOT DETECTED
IMM GRANULOCYTES # BLD AUTO: 0 K/UL (ref 0–0.04)
IMM GRANULOCYTES NFR BLD AUTO: 0 % (ref 0–0.5)
INR PPP: 1.4 (ref 0.9–1.1)
KETONES UR QL STRIP.AUTO: ABNORMAL MG/DL
LACTATE BLD-SCNC: 2.08 MMOL/L (ref 0.4–2)
LACTATE BLD-SCNC: 2.09 MMOL/L (ref 0.4–2)
LACTATE BLD-SCNC: 3.14 MMOL/L (ref 0.4–2)
LACTATE BLD-SCNC: 3.87 MMOL/L (ref 0.4–2)
LACTATE BLD-SCNC: 4.17 MMOL/L (ref 0.4–2)
LEUKOCYTE ESTERASE UR QL STRIP.AUTO: ABNORMAL
LIPASE SERPL-CCNC: 14 U/L (ref 13–75)
LYMPHOCYTES # BLD: 2.31 K/UL (ref 0.9–3.6)
LYMPHOCYTES NFR BLD: 8 % (ref 21–52)
M PNEUMO DNA SPEC QL NAA+PROBE: NOT DETECTED
MAGNESIUM SERPL-MCNC: 2.6 MG/DL (ref 1.6–2.6)
MCH RBC QN AUTO: 31.5 PG (ref 24–34)
MCHC RBC AUTO-ENTMCNC: 35.6 G/DL (ref 31–37)
MCV RBC AUTO: 88.3 FL (ref 78–100)
MONOCYTES # BLD: 5.2 K/UL (ref 0.05–1.2)
MONOCYTES NFR BLD: 18 % (ref 3–10)
NEUTS BAND NFR BLD MANUAL: 1 %
NEUTS SEG # BLD: 21.1 K/UL (ref 1.8–8)
NEUTS SEG NFR BLD: 72 % (ref 40–73)
NITRITE UR QL STRIP.AUTO: NEGATIVE
NRBC # BLD: 0 K/UL (ref 0–0.01)
NRBC BLD-RTO: 0 PER 100 WBC
PCO2 BLDV: 23.9 MMHG (ref 41–51)
PH BLDV: 7.25 (ref 7.32–7.42)
PH UR STRIP: 5 (ref 5–8)
PLATELET # BLD AUTO: 518 K/UL (ref 135–420)
PLATELET COMMENT: ABNORMAL
PMV BLD AUTO: 11 FL (ref 9.2–11.8)
PO2 BLDV: 39 MMHG (ref 25–40)
POTASSIUM BLD-SCNC: 3.6 MMOL/L (ref 3.5–5.1)
POTASSIUM SERPL-SCNC: 3.7 MMOL/L (ref 3.5–5.5)
POTASSIUM SERPL-SCNC: 4.4 MMOL/L (ref 3.5–5.5)
PROCALCITONIN SERPL-MCNC: 1.78 NG/ML
PROT SERPL-MCNC: 5.8 G/DL (ref 6.4–8.2)
PROT UR STRIP-MCNC: 30 MG/DL
PROTHROMBIN TIME: 17.1 SEC (ref 11.9–14.9)
RBC # BLD AUTO: 3.59 M/UL (ref 4.2–5.3)
RBC #/AREA URNS HPF: ABNORMAL /HPF (ref 0–5)
RBC MORPH BLD: ABNORMAL
RSV RNA SPEC QL NAA+PROBE: NOT DETECTED
RV+EV RNA SPEC QL NAA+PROBE: NOT DETECTED
SAO2 % BLD: 66 % (ref 94–98)
SARS-COV-2 RNA RESP QL NAA+PROBE: NOT DETECTED
SERVICE CMNT-IMP: ABNORMAL
SERVICE CMNT-IMP: ABNORMAL
SODIUM BLD-SCNC: 130 MMOL/L (ref 136–145)
SODIUM SERPL-SCNC: 128 MMOL/L (ref 136–145)
SODIUM SERPL-SCNC: 130 MMOL/L (ref 136–145)
SP GR UR REFRACTOMETRY: 1.02 (ref 1–1.03)
SPECIMEN SITE: ABNORMAL
T4 FREE SERPL-MCNC: <0.2 NG/DL (ref 0.9–1.7)
TROPONIN T SERPL HS-MCNC: 40.4 NG/L (ref 0–14)
TROPONIN T SERPL HS-MCNC: 50.9 NG/L (ref 0–14)
TROPONIN T SERPL HS-MCNC: 63.6 NG/L (ref 0–14)
TSH, 3RD GENERATION: 0.03 UIU/ML (ref 0.27–4.2)
UROBILINOGEN UR QL STRIP.AUTO: 0.2 EU/DL (ref 0.2–1)
WBC # BLD AUTO: 28.9 K/UL (ref 4.6–13.2)
WBC URNS QL MICRO: ABNORMAL /HPF (ref 0–4)

## 2025-05-02 PROCEDURE — 51702 INSERT TEMP BLADDER CATH: CPT

## 2025-05-02 PROCEDURE — 6370000000 HC RX 637 (ALT 250 FOR IP)

## 2025-05-02 PROCEDURE — 87086 URINE CULTURE/COLONY COUNT: CPT

## 2025-05-02 PROCEDURE — 83690 ASSAY OF LIPASE: CPT

## 2025-05-02 PROCEDURE — 96365 THER/PROPH/DIAG IV INF INIT: CPT

## 2025-05-02 PROCEDURE — 84145 PROCALCITONIN (PCT): CPT

## 2025-05-02 PROCEDURE — 80048 BASIC METABOLIC PNL TOTAL CA: CPT

## 2025-05-02 PROCEDURE — 94761 N-INVAS EAR/PLS OXIMETRY MLT: CPT

## 2025-05-02 PROCEDURE — 80076 HEPATIC FUNCTION PANEL: CPT

## 2025-05-02 PROCEDURE — 85025 COMPLETE CBC W/AUTO DIFF WBC: CPT

## 2025-05-02 PROCEDURE — 85014 HEMATOCRIT: CPT

## 2025-05-02 PROCEDURE — 84436 ASSAY OF TOTAL THYROXINE: CPT

## 2025-05-02 PROCEDURE — 2580000003 HC RX 258: Performed by: NURSE PRACTITIONER

## 2025-05-02 PROCEDURE — 83605 ASSAY OF LACTIC ACID: CPT

## 2025-05-02 PROCEDURE — 2580000003 HC RX 258

## 2025-05-02 PROCEDURE — 6360000002 HC RX W HCPCS: Performed by: NURSE PRACTITIONER

## 2025-05-02 PROCEDURE — 84484 ASSAY OF TROPONIN QUANT: CPT

## 2025-05-02 PROCEDURE — 93010 ELECTROCARDIOGRAM REPORT: CPT | Performed by: INTERNAL MEDICINE

## 2025-05-02 PROCEDURE — 72148 MRI LUMBAR SPINE W/O DYE: CPT

## 2025-05-02 PROCEDURE — 84132 ASSAY OF SERUM POTASSIUM: CPT

## 2025-05-02 PROCEDURE — 87077 CULTURE AEROBIC IDENTIFY: CPT

## 2025-05-02 PROCEDURE — 93005 ELECTROCARDIOGRAM TRACING: CPT

## 2025-05-02 PROCEDURE — 99285 EMERGENCY DEPT VISIT HI MDM: CPT

## 2025-05-02 PROCEDURE — 84295 ASSAY OF SERUM SODIUM: CPT

## 2025-05-02 PROCEDURE — 1100000000 HC RM PRIVATE

## 2025-05-02 PROCEDURE — 82330 ASSAY OF CALCIUM: CPT

## 2025-05-02 PROCEDURE — 96366 THER/PROPH/DIAG IV INF ADDON: CPT

## 2025-05-02 PROCEDURE — 2500000003 HC RX 250 WO HCPCS: Performed by: NURSE PRACTITIONER

## 2025-05-02 PROCEDURE — 87154 CUL TYP ID BLD PTHGN 6+ TRGT: CPT

## 2025-05-02 PROCEDURE — 82550 ASSAY OF CK (CPK): CPT

## 2025-05-02 PROCEDURE — 96376 TX/PRO/DX INJ SAME DRUG ADON: CPT

## 2025-05-02 PROCEDURE — 96367 TX/PROPH/DG ADDL SEQ IV INF: CPT

## 2025-05-02 PROCEDURE — 82947 ASSAY GLUCOSE BLOOD QUANT: CPT

## 2025-05-02 PROCEDURE — 96375 TX/PRO/DX INJ NEW DRUG ADDON: CPT

## 2025-05-02 PROCEDURE — 6370000000 HC RX 637 (ALT 250 FOR IP): Performed by: NURSE PRACTITIONER

## 2025-05-02 PROCEDURE — 87186 SC STD MICRODIL/AGAR DIL: CPT

## 2025-05-02 PROCEDURE — 82803 BLOOD GASES ANY COMBINATION: CPT

## 2025-05-02 PROCEDURE — 99223 1ST HOSP IP/OBS HIGH 75: CPT | Performed by: NURSE PRACTITIONER

## 2025-05-02 PROCEDURE — 84443 ASSAY THYROID STIM HORMONE: CPT

## 2025-05-02 PROCEDURE — 0202U NFCT DS 22 TRGT SARS-COV-2: CPT

## 2025-05-02 PROCEDURE — 85610 PROTHROMBIN TIME: CPT

## 2025-05-02 PROCEDURE — 6360000002 HC RX W HCPCS

## 2025-05-02 PROCEDURE — 96361 HYDRATE IV INFUSION ADD-ON: CPT

## 2025-05-02 PROCEDURE — 70450 CT HEAD/BRAIN W/O DYE: CPT

## 2025-05-02 PROCEDURE — 81001 URINALYSIS AUTO W/SCOPE: CPT

## 2025-05-02 PROCEDURE — 74176 CT ABD & PELVIS W/O CONTRAST: CPT

## 2025-05-02 PROCEDURE — 93005 ELECTROCARDIOGRAM TRACING: CPT | Performed by: NURSE PRACTITIONER

## 2025-05-02 PROCEDURE — 2500000003 HC RX 250 WO HCPCS

## 2025-05-02 PROCEDURE — 71045 X-RAY EXAM CHEST 1 VIEW: CPT

## 2025-05-02 PROCEDURE — 73502 X-RAY EXAM HIP UNI 2-3 VIEWS: CPT

## 2025-05-02 PROCEDURE — 84480 ASSAY TRIIODOTHYRONINE (T3): CPT

## 2025-05-02 PROCEDURE — 85730 THROMBOPLASTIN TIME PARTIAL: CPT

## 2025-05-02 PROCEDURE — 83735 ASSAY OF MAGNESIUM: CPT

## 2025-05-02 PROCEDURE — 84439 ASSAY OF FREE THYROXINE: CPT

## 2025-05-02 PROCEDURE — 87040 BLOOD CULTURE FOR BACTERIA: CPT

## 2025-05-02 RX ORDER — KETOROLAC TROMETHAMINE 15 MG/ML
15 INJECTION, SOLUTION INTRAMUSCULAR; INTRAVENOUS
Status: COMPLETED | OUTPATIENT
Start: 2025-05-02 | End: 2025-05-02

## 2025-05-02 RX ORDER — VANCOMYCIN 1.5 G/300ML
1500 INJECTION, SOLUTION INTRAVENOUS ONCE
Status: COMPLETED | OUTPATIENT
Start: 2025-05-02 | End: 2025-05-02

## 2025-05-02 RX ORDER — POTASSIUM CHLORIDE 7.45 MG/ML
10 INJECTION INTRAVENOUS PRN
Status: DISCONTINUED | OUTPATIENT
Start: 2025-05-02 | End: 2025-05-02

## 2025-05-02 RX ORDER — ACETAMINOPHEN 325 MG/1
650 TABLET ORAL EVERY 6 HOURS PRN
Status: DISCONTINUED | OUTPATIENT
Start: 2025-05-02 | End: 2025-05-12

## 2025-05-02 RX ORDER — POTASSIUM CHLORIDE 1500 MG/1
40 TABLET, EXTENDED RELEASE ORAL PRN
Status: DISCONTINUED | OUTPATIENT
Start: 2025-05-02 | End: 2025-05-02

## 2025-05-02 RX ORDER — CALCIUM GLUCONATE 94 MG/ML
2000 INJECTION, SOLUTION INTRAVENOUS
Status: COMPLETED | OUTPATIENT
Start: 2025-05-02 | End: 2025-05-02

## 2025-05-02 RX ORDER — HEPARIN SODIUM 1000 [USP'U]/ML
60 INJECTION, SOLUTION INTRAVENOUS; SUBCUTANEOUS PRN
Status: DISCONTINUED | OUTPATIENT
Start: 2025-05-02 | End: 2025-05-10

## 2025-05-02 RX ORDER — ACETAMINOPHEN 500 MG
1000 TABLET ORAL
Status: COMPLETED | OUTPATIENT
Start: 2025-05-02 | End: 2025-05-02

## 2025-05-02 RX ORDER — VANCOMYCIN 1.25 G/250ML
25 INJECTION, SOLUTION INTRAVENOUS ONCE
Status: DISCONTINUED | OUTPATIENT
Start: 2025-05-02 | End: 2025-05-02

## 2025-05-02 RX ORDER — SODIUM CHLORIDE 0.9 % (FLUSH) 0.9 %
5-40 SYRINGE (ML) INJECTION PRN
Status: DISCONTINUED | OUTPATIENT
Start: 2025-05-02 | End: 2025-05-13 | Stop reason: HOSPADM

## 2025-05-02 RX ORDER — MORPHINE SULFATE 2 MG/ML
2 INJECTION, SOLUTION INTRAMUSCULAR; INTRAVENOUS
Refills: 0 | Status: COMPLETED | OUTPATIENT
Start: 2025-05-02 | End: 2025-05-02

## 2025-05-02 RX ORDER — LEVOTHYROXINE SODIUM ANHYDROUS 100 UG/5ML
100 INJECTION, POWDER, LYOPHILIZED, FOR SOLUTION INTRAVENOUS DAILY
Status: DISCONTINUED | OUTPATIENT
Start: 2025-05-02 | End: 2025-05-03 | Stop reason: SDUPTHER

## 2025-05-02 RX ORDER — DILTIAZEM HYDROCHLORIDE 30 MG/1
30 TABLET, FILM COATED ORAL EVERY 6 HOURS SCHEDULED
Status: DISCONTINUED | OUTPATIENT
Start: 2025-05-02 | End: 2025-05-05

## 2025-05-02 RX ORDER — OXYCODONE HYDROCHLORIDE 5 MG/1
5 TABLET ORAL EVERY 4 HOURS PRN
Refills: 0 | Status: DISCONTINUED | OUTPATIENT
Start: 2025-05-02 | End: 2025-05-06

## 2025-05-02 RX ORDER — DILTIAZEM HYDROCHLORIDE 5 MG/ML
0.25 INJECTION INTRAVENOUS
Status: COMPLETED | OUTPATIENT
Start: 2025-05-02 | End: 2025-05-02

## 2025-05-02 RX ORDER — HEPARIN SODIUM 10000 [USP'U]/100ML
5-30 INJECTION, SOLUTION INTRAVENOUS CONTINUOUS
Status: DISCONTINUED | OUTPATIENT
Start: 2025-05-02 | End: 2025-05-10

## 2025-05-02 RX ORDER — 0.9 % SODIUM CHLORIDE 0.9 %
1000 INTRAVENOUS SOLUTION INTRAVENOUS ONCE
Status: COMPLETED | OUTPATIENT
Start: 2025-05-02 | End: 2025-05-02

## 2025-05-02 RX ORDER — MORPHINE SULFATE 2 MG/ML
2 INJECTION, SOLUTION INTRAMUSCULAR; INTRAVENOUS
Status: COMPLETED | OUTPATIENT
Start: 2025-05-02 | End: 2025-05-02

## 2025-05-02 RX ORDER — ONDANSETRON 2 MG/ML
4 INJECTION INTRAMUSCULAR; INTRAVENOUS EVERY 6 HOURS PRN
Status: DISCONTINUED | OUTPATIENT
Start: 2025-05-02 | End: 2025-05-12 | Stop reason: SDUPTHER

## 2025-05-02 RX ORDER — ONDANSETRON 2 MG/ML
4 INJECTION INTRAMUSCULAR; INTRAVENOUS
Status: COMPLETED | OUTPATIENT
Start: 2025-05-02 | End: 2025-05-02

## 2025-05-02 RX ORDER — ACETAMINOPHEN 650 MG/1
650 SUPPOSITORY RECTAL EVERY 6 HOURS PRN
Status: DISCONTINUED | OUTPATIENT
Start: 2025-05-02 | End: 2025-05-13 | Stop reason: HOSPADM

## 2025-05-02 RX ORDER — SODIUM CHLORIDE 9 MG/ML
INJECTION, SOLUTION INTRAVENOUS PRN
Status: DISCONTINUED | OUTPATIENT
Start: 2025-05-02 | End: 2025-05-12

## 2025-05-02 RX ORDER — ASPIRIN 325 MG
325 TABLET ORAL ONCE
Status: COMPLETED | OUTPATIENT
Start: 2025-05-02 | End: 2025-05-02

## 2025-05-02 RX ORDER — LIDOCAINE 4 G/G
1 PATCH TOPICAL DAILY
Status: DISCONTINUED | OUTPATIENT
Start: 2025-05-03 | End: 2025-05-13 | Stop reason: HOSPADM

## 2025-05-02 RX ORDER — DILTIAZEM HYDROCHLORIDE 30 MG/1
90 TABLET, FILM COATED ORAL
Status: DISCONTINUED | OUTPATIENT
Start: 2025-05-02 | End: 2025-05-02

## 2025-05-02 RX ORDER — SODIUM CHLORIDE, SODIUM LACTATE, POTASSIUM CHLORIDE, AND CALCIUM CHLORIDE .6; .31; .03; .02 G/100ML; G/100ML; G/100ML; G/100ML
1000 INJECTION, SOLUTION INTRAVENOUS ONCE
Status: DISCONTINUED | OUTPATIENT
Start: 2025-05-02 | End: 2025-05-02

## 2025-05-02 RX ORDER — DILTIAZEM HYDROCHLORIDE 30 MG/1
30 TABLET, FILM COATED ORAL
Status: COMPLETED | OUTPATIENT
Start: 2025-05-02 | End: 2025-05-02

## 2025-05-02 RX ORDER — POLYETHYLENE GLYCOL 3350 17 G/17G
17 POWDER, FOR SOLUTION ORAL DAILY PRN
Status: DISCONTINUED | OUTPATIENT
Start: 2025-05-02 | End: 2025-05-12

## 2025-05-02 RX ORDER — OXYCODONE HYDROCHLORIDE 10 MG/1
10 TABLET ORAL EVERY 6 HOURS PRN
Refills: 0 | Status: DISCONTINUED | OUTPATIENT
Start: 2025-05-02 | End: 2025-05-06

## 2025-05-02 RX ORDER — SODIUM CHLORIDE 9 MG/ML
INJECTION, SOLUTION INTRAVENOUS CONTINUOUS
Status: DISCONTINUED | OUTPATIENT
Start: 2025-05-02 | End: 2025-05-03

## 2025-05-02 RX ORDER — DIAZEPAM 10 MG/2ML
2.5 INJECTION, SOLUTION INTRAMUSCULAR; INTRAVENOUS ONCE
Status: DISCONTINUED | OUTPATIENT
Start: 2025-05-02 | End: 2025-05-02

## 2025-05-02 RX ORDER — LIDOCAINE 4 G/G
1 PATCH TOPICAL
Status: DISCONTINUED | OUTPATIENT
Start: 2025-05-02 | End: 2025-05-02

## 2025-05-02 RX ORDER — HEPARIN SODIUM 1000 [USP'U]/ML
30 INJECTION, SOLUTION INTRAVENOUS; SUBCUTANEOUS PRN
Status: DISCONTINUED | OUTPATIENT
Start: 2025-05-02 | End: 2025-05-10

## 2025-05-02 RX ORDER — ASPIRIN 81 MG/1
81 TABLET ORAL DAILY
Status: DISCONTINUED | OUTPATIENT
Start: 2025-05-03 | End: 2025-05-10

## 2025-05-02 RX ORDER — HEPARIN SODIUM 1000 [USP'U]/ML
60 INJECTION, SOLUTION INTRAVENOUS; SUBCUTANEOUS ONCE
Status: COMPLETED | OUTPATIENT
Start: 2025-05-02 | End: 2025-05-02

## 2025-05-02 RX ORDER — SODIUM CHLORIDE, SODIUM LACTATE, POTASSIUM CHLORIDE, AND CALCIUM CHLORIDE .6; .31; .03; .02 G/100ML; G/100ML; G/100ML; G/100ML
1000 INJECTION, SOLUTION INTRAVENOUS ONCE
Status: COMPLETED | OUTPATIENT
Start: 2025-05-02 | End: 2025-05-02

## 2025-05-02 RX ORDER — DIAZEPAM 10 MG/2ML
2.5 INJECTION, SOLUTION INTRAMUSCULAR; INTRAVENOUS
Status: DISCONTINUED | OUTPATIENT
Start: 2025-05-02 | End: 2025-05-02

## 2025-05-02 RX ORDER — ONDANSETRON 4 MG/1
4 TABLET, ORALLY DISINTEGRATING ORAL EVERY 8 HOURS PRN
Status: DISCONTINUED | OUTPATIENT
Start: 2025-05-02 | End: 2025-05-13 | Stop reason: HOSPADM

## 2025-05-02 RX ORDER — SODIUM CHLORIDE 0.9 % (FLUSH) 0.9 %
5-40 SYRINGE (ML) INJECTION EVERY 12 HOURS SCHEDULED
Status: DISCONTINUED | OUTPATIENT
Start: 2025-05-02 | End: 2025-05-13 | Stop reason: HOSPADM

## 2025-05-02 RX ORDER — DIAZEPAM 2 MG/1
2 TABLET ORAL EVERY 6 HOURS PRN
Status: ON HOLD | COMMUNITY
End: 2025-05-13 | Stop reason: HOSPADM

## 2025-05-02 RX ADMIN — VANCOMYCIN 1500 MG: 1.5 INJECTION, SOLUTION INTRAVENOUS at 11:17

## 2025-05-02 RX ADMIN — SODIUM CHLORIDE: 0.9 INJECTION, SOLUTION INTRAVENOUS at 22:04

## 2025-05-02 RX ADMIN — SERTRALINE HYDROCHLORIDE 50 MG: 50 TABLET ORAL at 22:49

## 2025-05-02 RX ADMIN — DILTIAZEM HYDROCHLORIDE 16.5 MG: 5 INJECTION, SOLUTION INTRAVENOUS at 11:19

## 2025-05-02 RX ADMIN — SODIUM CHLORIDE, SODIUM LACTATE, POTASSIUM CHLORIDE, AND CALCIUM CHLORIDE 1000 ML: .6; .31; .03; .02 INJECTION, SOLUTION INTRAVENOUS at 15:13

## 2025-05-02 RX ADMIN — DILTIAZEM HYDROCHLORIDE 30 MG: 30 TABLET, FILM COATED ORAL at 16:08

## 2025-05-02 RX ADMIN — CEFEPIME 2000 MG: 2 INJECTION, POWDER, FOR SOLUTION INTRAVENOUS at 09:15

## 2025-05-02 RX ADMIN — KETOROLAC TROMETHAMINE 15 MG: 15 INJECTION, SOLUTION INTRAMUSCULAR; INTRAVENOUS at 08:12

## 2025-05-02 RX ADMIN — MORPHINE SULFATE 2 MG: 2 INJECTION, SOLUTION INTRAMUSCULAR; INTRAVENOUS at 11:30

## 2025-05-02 RX ADMIN — MORPHINE SULFATE 2 MG: 2 INJECTION, SOLUTION INTRAMUSCULAR; INTRAVENOUS at 17:02

## 2025-05-02 RX ADMIN — HEPARIN SODIUM 4000 UNITS: 1000 INJECTION, SOLUTION INTRAVENOUS; SUBCUTANEOUS at 15:59

## 2025-05-02 RX ADMIN — SODIUM CHLORIDE, PRESERVATIVE FREE 10 ML: 5 INJECTION INTRAVENOUS at 22:05

## 2025-05-02 RX ADMIN — ASPIRIN 325 MG: 325 TABLET ORAL at 16:47

## 2025-05-02 RX ADMIN — ONDANSETRON 4 MG: 2 INJECTION, SOLUTION INTRAMUSCULAR; INTRAVENOUS at 09:15

## 2025-05-02 RX ADMIN — HEPARIN SODIUM 2000 UNITS: 1000 INJECTION INTRAVENOUS; SUBCUTANEOUS at 22:01

## 2025-05-02 RX ADMIN — DILTIAZEM HYDROCHLORIDE 30 MG: 30 TABLET, FILM COATED ORAL at 22:49

## 2025-05-02 RX ADMIN — MORPHINE SULFATE 2 MG: 2 INJECTION, SOLUTION INTRAMUSCULAR; INTRAVENOUS at 19:14

## 2025-05-02 RX ADMIN — MORPHINE SULFATE 2 MG: 2 INJECTION, SOLUTION INTRAMUSCULAR; INTRAVENOUS at 09:15

## 2025-05-02 RX ADMIN — SODIUM CHLORIDE 1000 ML: 0.9 INJECTION, SOLUTION INTRAVENOUS at 09:17

## 2025-05-02 RX ADMIN — ACETAMINOPHEN 1000 MG: 500 TABLET, FILM COATED ORAL at 08:11

## 2025-05-02 RX ADMIN — LEVOTHYROXINE SODIUM ANHYDROUS 100 MCG: 100 INJECTION, POWDER, LYOPHILIZED, FOR SOLUTION INTRAVENOUS at 15:10

## 2025-05-02 RX ADMIN — HEPARIN SODIUM 12 UNITS/KG/HR: 10000 INJECTION, SOLUTION INTRAVENOUS at 16:06

## 2025-05-02 RX ADMIN — CALCIUM GLUCONATE 2000 MG: 98 INJECTION INTRAVENOUS at 11:10

## 2025-05-02 ASSESSMENT — PAIN DESCRIPTION - ORIENTATION
ORIENTATION: RIGHT
ORIENTATION: LOWER
ORIENTATION: RIGHT;LEFT
ORIENTATION: RIGHT
ORIENTATION: RIGHT
ORIENTATION: LEFT

## 2025-05-02 ASSESSMENT — PAIN SCALES - GENERAL
PAINLEVEL_OUTOF10: 10
PAINLEVEL_OUTOF10: 8
PAINLEVEL_OUTOF10: 0
PAINLEVEL_OUTOF10: 9
PAINLEVEL_OUTOF10: 5
PAINLEVEL_OUTOF10: 5
PAINLEVEL_OUTOF10: 9
PAINLEVEL_OUTOF10: 10
PAINLEVEL_OUTOF10: 6

## 2025-05-02 ASSESSMENT — PAIN DESCRIPTION - DESCRIPTORS
DESCRIPTORS: ACHING

## 2025-05-02 ASSESSMENT — PAIN DESCRIPTION - LOCATION
LOCATION: BACK;HIP
LOCATION: BACK
LOCATION: BACK;HIP

## 2025-05-02 ASSESSMENT — PAIN - FUNCTIONAL ASSESSMENT
PAIN_FUNCTIONAL_ASSESSMENT: PREVENTS OR INTERFERES SOME ACTIVE ACTIVITIES AND ADLS
PAIN_FUNCTIONAL_ASSESSMENT: 0-10

## 2025-05-02 NOTE — ED NOTES
Bedside and Verbal shift change report given to Shalonda (oncoming nurse) by Joy (offgoing nurse). Report included the following information Nurse Handoff Report, ED SBAR, MAR, and Recent Results.

## 2025-05-02 NOTE — ED TRIAGE NOTES
Pt arrived via ems from home   Per ems pt son stated \" Pt had an arthiritic attack two weeks ago\"  And pt has been unable to take care of herself due to having back pain   Per ems pt son also states she has been non compliant with medication regimen   PT is hard of hearing

## 2025-05-02 NOTE — ED PROVIDER NOTES
.[facility] EMERGENCY DEPARTMENT  EMERGENCY DEPARTMENT ENCOUNTER      Pt Name: Yee Alegria  MRN: [id number]  Birthdate 8/22/1928  Date of evaluation: 5/2/2025  Provider: Kana Covington MD  7:47 PM    CHIEF COMPLAINT       Chief Complaint   Patient presents with    Back Pain         HISTORY OF PRESENT ILLNESS    Yee Alegria is a 89 y.o. female w/ pmhx HTN, HLD, hx SVT/PVCs/PACs, hypothyroidism who presents to the emergency department w/ acute on chronic left hip pain.  Patient able to respond to questions but significant history obtained from EMS.  Patient complains of 5 out of 10 left hip pain, has not been able to walk on it, has been laying on that side for many days.  Has not taken any medications for this.  Lives alone.  Denies any fevers or chills, no numbness or tingling of her lower extremities.  No radicular symptoms.    Per EMS, patient's son who is primary caregiver and does not live with patient has not been able to  her pain medications for her chronic low back pain.  Patient has had severe low back pain, unable to get out of bed, no extra help at home.  Son is very frustrated.    Upon chart review patient was here in this ED proximally 1 week ago for low back pain and right hip pain.  Left hip pain seems to be acute in setting of having to lay on left side after having right hip pain.    HPI    Nursing Notes were reviewed.    REVIEW OF SYSTEMS       Review of Systems    Except as noted above the remainder of the review of systems was reviewed and negative.       PAST MEDICAL HISTORY     Past Medical History:   Diagnosis Date    Benign hypertensive heart disease without heart failure     Bradycardia     with verapamil    History of echocardiogram 02/13/2007    [facility]:  EF 65-70%.  No WMA.  Mild LVH.  No significant valve abnormalities.    Holter monitor, abnormal 11/10/2016    Sinus rhythm, avg 71 bpm (range 57-96 bpm).  Occasional SVEs (mainly single, w/paired

## 2025-05-02 NOTE — ED NOTES
Per DO Gross and Resident caring for pt, ok to pause heparin gtt while pt getting MRI, per [facility] nursing order    Heparin gtt paused just prior to transport.

## 2025-05-02 NOTE — ED NOTES
Pt with moderate BM, cleaned and changed depends, placed on purewick    After changed, pt was noted to be vomiting, approx 125 green/yellow liquid emesis    Pt turned to side and suctioned. Gown changed

## 2025-05-02 NOTE — ED PROVIDER NOTES
Sinus rhythm at 100 with motion artifact no ST elevation or depression normal axis normal intervals interpreted by Jl Leon MD  05/02/25 [id number]

## 2025-05-03 LAB
ACB COMPLEX DNA BLD POS QL NAA+NON-PROBE: NOT DETECTED
ACCESSION NUMBER, LLC1M: ABNORMAL
ANION GAP SERPL CALC-SCNC: 26 MMOL/L (ref 3–18)
APTT PPP: 63.5 SEC (ref 23–36.4)
APTT PPP: 83.3 SEC (ref 23–36.4)
APTT PPP: 88.9 SEC (ref 23–36.4)
B FRAGILIS DNA BLD POS QL NAA+NON-PROBE: NOT DETECTED
BACTERIA SPEC CULT: NORMAL
BASOPHILS # BLD: 0.34 K/UL (ref 0–0.1)
BASOPHILS NFR BLD: 1 % (ref 0–2)
BIOFIRE TEST COMMENT: ABNORMAL
BLACTX-M ISLT/SPM QL: NOT DETECTED
BLAIMP ISLT/SPM QL: NOT DETECTED
BLAKPC ISLT/SPM QL: NOT DETECTED
BLAVIM ISLT/SPM QL: NOT DETECTED
BUN SERPL-MCNC: 72 MG/DL (ref 6–23)
BUN/CREAT SERPL: 23 (ref 12–20)
C ALBICANS DNA BLD POS QL NAA+NON-PROBE: NOT DETECTED
C AURIS DNA BLD POS QL NAA+NON-PROBE: NOT DETECTED
C GATTII+NEOFOR DNA BLD POS QL NAA+N-PRB: NOT DETECTED
C GLABRATA DNA BLD POS QL NAA+NON-PROBE: NOT DETECTED
C KRUSEI DNA BLD POS QL NAA+NON-PROBE: NOT DETECTED
C PARAP DNA BLD POS QL NAA+NON-PROBE: NOT DETECTED
C TROPICLS DNA BLD POS QL NAA+NON-PROBE: NOT DETECTED
CALCIUM SERPL-MCNC: 8.1 MG/DL (ref 8.5–10.1)
CHLORIDE SERPL-SCNC: 92 MMOL/L (ref 98–107)
CO2 SERPL-SCNC: 11 MMOL/L (ref 21–32)
CREAT SERPL-MCNC: 3.19 MG/DL (ref 0.6–1.3)
DIFFERENTIAL METHOD BLD: ABNORMAL
E CLOAC COMP DNA BLD POS NAA+NON-PROBE: NOT DETECTED
E COLI DNA BLD POS QL NAA+NON-PROBE: NOT DETECTED
E FAECALIS DNA BLD POS QL NAA+NON-PROBE: NOT DETECTED
E FAECIUM DNA BLD POS QL NAA+NON-PROBE: NOT DETECTED
ENTEROBACTERALES DNA BLD POS NAA+N-PRB: NOT DETECTED
EOSINOPHIL # BLD: 0 K/UL (ref 0–0.4)
EOSINOPHIL NFR BLD: 0 % (ref 0–5)
ERYTHROCYTE [DISTWIDTH] IN BLOOD BY AUTOMATED COUNT: 17.1 % (ref 11.6–14.5)
FSH SERPL-ACNC: 14.2 MIU/ML
GLUCOSE SERPL-MCNC: 151 MG/DL (ref 74–108)
GP B STREP DNA BLD POS QL NAA+NON-PROBE: NOT DETECTED
HAEM INFLU DNA BLD POS QL NAA+NON-PROBE: NOT DETECTED
HCT VFR BLD AUTO: 28.3 % (ref 35–45)
HGB BLD-MCNC: 9.9 G/DL (ref 12–16)
IMM GRANULOCYTES # BLD AUTO: 0 K/UL (ref 0–0.04)
IMM GRANULOCYTES NFR BLD AUTO: 0 % (ref 0–0.5)
K OXYTOCA DNA BLD POS QL NAA+NON-PROBE: NOT DETECTED
KLEBSIELLA SP DNA BLD POS QL NAA+NON-PRB: NOT DETECTED
KLEBSIELLA SP DNA BLD POS QL NAA+NON-PRB: NOT DETECTED
L MONOCYTOG DNA BLD POS QL NAA+NON-PROBE: NOT DETECTED
LACTATE BLD-SCNC: 2.4 MMOL/L (ref 0.4–2)
LACTATE SERPL-SCNC: 2.2 MMOL/L (ref 0.4–2)
LACTATE SERPL-SCNC: 2.2 MMOL/L (ref 0.4–2)
LH SERPL-ACNC: 10 MIU/ML
LYMPHOCYTES # BLD: 7.44 K/UL (ref 0.9–3.6)
LYMPHOCYTES NFR BLD: 22 % (ref 21–52)
MCH RBC QN AUTO: 32 PG (ref 24–34)
MCHC RBC AUTO-ENTMCNC: 35 G/DL (ref 31–37)
MCV RBC AUTO: 91.6 FL (ref 78–100)
MONOCYTES # BLD: 3.72 K/UL (ref 0.05–1.2)
MONOCYTES NFR BLD: 11 % (ref 3–10)
N MEN DNA BLD POS QL NAA+NON-PROBE: NOT DETECTED
NEUTS BAND NFR BLD MANUAL: 1 %
NEUTS SEG # BLD: 22.3 K/UL (ref 1.8–8)
NEUTS SEG NFR BLD: 65 % (ref 40–73)
NRBC # BLD: 0.02 K/UL (ref 0–0.01)
NRBC BLD-RTO: 0.1 PER 100 WBC
P AERUGINOSA DNA BLD POS NAA+NON-PROBE: DETECTED
PLATELET # BLD AUTO: 454 K/UL (ref 135–420)
PLATELET COMMENT: ABNORMAL
PMV BLD AUTO: 11.5 FL (ref 9.2–11.8)
POTASSIUM SERPL-SCNC: 4.3 MMOL/L (ref 3.5–5.5)
PROCALCITONIN SERPL-MCNC: 40.1 NG/ML
PROLACTIN SERPL-MCNC: 79.4 NG/ML
PROTEUS SP DNA BLD POS QL NAA+NON-PROBE: NOT DETECTED
RBC # BLD AUTO: 3.09 M/UL (ref 4.2–5.3)
RBC MORPH BLD: ABNORMAL
RESISTANT GENE NDM BY PCR: NOT DETECTED
RESISTANT GENE TARGETS: ABNORMAL
S AUREUS DNA BLD POS QL NAA+NON-PROBE: NOT DETECTED
S AUREUS+CONS DNA BLD POS NAA+NON-PROBE: NOT DETECTED
S EPIDERMIDIS DNA BLD POS QL NAA+NON-PRB: NOT DETECTED
S LUGDUNENSIS DNA BLD POS QL NAA+NON-PRB: NOT DETECTED
S MALTOPHILIA DNA BLD POS QL NAA+NON-PRB: NOT DETECTED
S MARCESCENS DNA BLD POS NAA+NON-PROBE: NOT DETECTED
S PNEUM DNA BLD POS QL NAA+NON-PROBE: NOT DETECTED
S PYO DNA BLD POS QL NAA+NON-PROBE: NOT DETECTED
SALMONELLA DNA BLD POS QL NAA+NON-PROBE: NOT DETECTED
SERVICE CMNT-IMP: NORMAL
SODIUM SERPL-SCNC: 129 MMOL/L (ref 136–145)
STREPTOCOCCUS DNA BLD POS NAA+NON-PROBE: DETECTED
T4 FREE SERPL-MCNC: 0.9 NG/DL (ref 0.9–1.7)
VANCOMYCIN SERPL-MCNC: 15.7 UG/ML (ref 5–40)
WBC # BLD AUTO: 33.8 K/UL (ref 4.6–13.2)

## 2025-05-03 PROCEDURE — 84439 ASSAY OF FREE THYROXINE: CPT

## 2025-05-03 PROCEDURE — 6370000000 HC RX 637 (ALT 250 FOR IP): Performed by: NURSE PRACTITIONER

## 2025-05-03 PROCEDURE — 87040 BLOOD CULTURE FOR BACTERIA: CPT

## 2025-05-03 PROCEDURE — 2580000003 HC RX 258: Performed by: NURSE PRACTITIONER

## 2025-05-03 PROCEDURE — 1100000003 HC PRIVATE W/ TELEMETRY

## 2025-05-03 PROCEDURE — 6360000002 HC RX W HCPCS: Performed by: STUDENT IN AN ORGANIZED HEALTH CARE EDUCATION/TRAINING PROGRAM

## 2025-05-03 PROCEDURE — 83001 ASSAY OF GONADOTROPIN (FSH): CPT

## 2025-05-03 PROCEDURE — 36415 COLL VENOUS BLD VENIPUNCTURE: CPT

## 2025-05-03 PROCEDURE — 86376 MICROSOMAL ANTIBODY EACH: CPT

## 2025-05-03 PROCEDURE — 85730 THROMBOPLASTIN TIME PARTIAL: CPT

## 2025-05-03 PROCEDURE — 6360000002 HC RX W HCPCS: Performed by: NURSE PRACTITIONER

## 2025-05-03 PROCEDURE — 83605 ASSAY OF LACTIC ACID: CPT

## 2025-05-03 PROCEDURE — 2580000003 HC RX 258: Performed by: STUDENT IN AN ORGANIZED HEALTH CARE EDUCATION/TRAINING PROGRAM

## 2025-05-03 PROCEDURE — 85025 COMPLETE CBC W/AUTO DIFF WBC: CPT

## 2025-05-03 PROCEDURE — 99232 SBSQ HOSP IP/OBS MODERATE 35: CPT | Performed by: STUDENT IN AN ORGANIZED HEALTH CARE EDUCATION/TRAINING PROGRAM

## 2025-05-03 PROCEDURE — 84305 ASSAY OF SOMATOMEDIN: CPT

## 2025-05-03 PROCEDURE — 84145 PROCALCITONIN (PCT): CPT

## 2025-05-03 PROCEDURE — 6360000002 HC RX W HCPCS

## 2025-05-03 PROCEDURE — 80048 BASIC METABOLIC PNL TOTAL CA: CPT

## 2025-05-03 PROCEDURE — 84146 ASSAY OF PROLACTIN: CPT

## 2025-05-03 PROCEDURE — 83002 ASSAY OF GONADOTROPIN (LH): CPT

## 2025-05-03 PROCEDURE — 2500000003 HC RX 250 WO HCPCS: Performed by: NURSE PRACTITIONER

## 2025-05-03 PROCEDURE — 80202 ASSAY OF VANCOMYCIN: CPT

## 2025-05-03 RX ORDER — DEXTROSE MONOHYDRATE AND SODIUM CHLORIDE 5; .9 G/100ML; G/100ML
INJECTION, SOLUTION INTRAVENOUS CONTINUOUS
Status: DISCONTINUED | OUTPATIENT
Start: 2025-05-03 | End: 2025-05-04

## 2025-05-03 RX ORDER — SODIUM CHLORIDE, SODIUM LACTATE, POTASSIUM CHLORIDE, AND CALCIUM CHLORIDE .6; .31; .03; .02 G/100ML; G/100ML; G/100ML; G/100ML
250 INJECTION, SOLUTION INTRAVENOUS ONCE
Status: COMPLETED | OUTPATIENT
Start: 2025-05-03 | End: 2025-05-03

## 2025-05-03 RX ADMIN — OXYCODONE HYDROCHLORIDE 10 MG: 10 TABLET ORAL at 03:47

## 2025-05-03 RX ADMIN — HEPARIN SODIUM 15 UNITS/KG/HR: 10000 INJECTION, SOLUTION INTRAVENOUS at 19:33

## 2025-05-03 RX ADMIN — LEVOTHYROXINE SODIUM ANHYDROUS 100 MCG: 100 INJECTION, POWDER, LYOPHILIZED, FOR SOLUTION INTRAVENOUS at 07:40

## 2025-05-03 RX ADMIN — HEPARIN SODIUM 4000 UNITS: 1000 INJECTION INTRAVENOUS; SUBCUTANEOUS at 08:15

## 2025-05-03 RX ADMIN — SODIUM CHLORIDE, PRESERVATIVE FREE 10 ML: 5 INJECTION INTRAVENOUS at 11:40

## 2025-05-03 RX ADMIN — ASPIRIN 81 MG: 81 TABLET, COATED ORAL at 14:11

## 2025-05-03 RX ADMIN — DILTIAZEM HYDROCHLORIDE 30 MG: 30 TABLET, FILM COATED ORAL at 07:37

## 2025-05-03 RX ADMIN — DILTIAZEM HYDROCHLORIDE 30 MG: 30 TABLET, FILM COATED ORAL at 14:11

## 2025-05-03 RX ADMIN — SODIUM CHLORIDE, SODIUM LACTATE, POTASSIUM CHLORIDE, AND CALCIUM CHLORIDE 250 ML: .6; .31; .03; .02 INJECTION, SOLUTION INTRAVENOUS at 17:04

## 2025-05-03 RX ADMIN — SERTRALINE HYDROCHLORIDE 50 MG: 50 TABLET ORAL at 14:11

## 2025-05-03 RX ADMIN — CEFEPIME 1000 MG: 1 INJECTION, POWDER, FOR SOLUTION INTRAMUSCULAR; INTRAVENOUS at 19:40

## 2025-05-03 RX ADMIN — SODIUM CHLORIDE: 0.9 INJECTION, SOLUTION INTRAVENOUS at 11:39

## 2025-05-03 RX ADMIN — OXYCODONE HYDROCHLORIDE 5 MG: 5 TABLET ORAL at 14:10

## 2025-05-03 RX ADMIN — DEXTROSE AND SODIUM CHLORIDE: 5; .9 INJECTION, SOLUTION INTRAVENOUS at 19:42

## 2025-05-03 ASSESSMENT — PAIN SCALES - GENERAL
PAINLEVEL_OUTOF10: 0
PAINLEVEL_OUTOF10: 5

## 2025-05-03 ASSESSMENT — PAIN SCALES - PAIN ASSESSMENT IN ADVANCED DEMENTIA (PAINAD)
NEGVOCALIZATION: REPEATED TROUBLED CALLING OUT, LOUD MOANING/GROANING, CRYING
FACIALEXPRESSION: SAD, FRIGHTENED, FROWN
TOTALSCORE: 5
BODYLANGUAGE: TENSE, DISTRESSED PACING, FIDGETING
BREATHING: NORMAL
CONSOLABILITY: DISTRACTED OR REASSURED BY VOICE/TOUCH

## 2025-05-03 NOTE — ED NOTES
TRANSFER - OUT REPORT:    Verbal report given to Abel on Yee S Raji  being transferred to  for routine progression of patient care       Report consisted of patient's Situation, Background, Assessment and   Recommendations(SBAR).     Information from the following report(s) Nurse Handoff Report, ED SBAR, MAR, and Recent Results was reviewed with the receiving nurse.    [place] Fall Assessment:    Presents to emergency department  because of falls (Syncope, seizure, or loss of consciousness): No  Age > 70: Yes  Altered Mental Status, Intoxication with alcohol or substance confusion (Disorientation, impaired judgment, poor safety awaremess, or inability to follow instructions): Yes  Impaired Mobility: Ambulates or transfers with assistive devices or assistance; Unable to ambulate or transer.: Yes  Nursing Judgement: Yes          Lines:   Peripheral IV 05/02/25 Left Antecubital (Active)       Peripheral IV 05/02/25 Posterior;Proximal;Right Forearm (Active)   Site Assessment Clean, dry & intact 05/02/25 2231   Line Status Blood return noted;Flushed 05/02/25 2231   Phlebitis Assessment No symptoms 05/02/25 2231   Infiltration Assessment 0 05/02/25 2231   Dressing Status New dressing applied 05/02/25 2231   Dressing Type Transparent 05/02/25 2231        Opportunity for questions and clarification was provided.      Patient transported with:  Monitor and Registered Nurse

## 2025-05-03 NOTE — H&P
Left pelvis and hip x-ray so this needs significant degenerative changes 6 and distal lumbar spine, compression deformity at L-1  - MRI Lumbar: negative for lumbar vertebral body compression fracture  - Received Toradol, holding in setting of VIV  - Lidocaine patch and oxycodone as needed    Suspect sepsis possibly related to UTI, Present on admission  In the setting of dehydration vs VIV  - RR 25, , lactic acidosis and leukocytosis  - Chest x-ray nothing acute, RVP negative   - CT C/A/P: Large cortical cyst upper and lower pole of the left kidney  - UA positive glucose, ketone, and LE  - Continue Vanco and cefepime  - Pending blood and urine cultures  - Trend lactic and Pro-Tae  - Continue IV fluid hydration    Acute kidney injury  Likely prerenal in setting of sepsis and dehydration  - Creatinine 3.08, GFR 13  - Continue IV fluid hydration  - Monitor I's/O, avoid nephrotoxins, monitor BMP   - CT as noted above  - Consider renal consult in AM if no improvement     Hypothyroidism  - TSH 0.029, free T40.2  - Endocrinology consulted  - Continue levothyroxine 100 mcg IV daily   - Pending prolactin, IGF-1, FSH, LH, thyroid peroxidase antibiotics, daily free T4    A-fib with RVR, resolved  - EKG reviewed  - Cardiology added to treatment team, please contact in AM   - Status post Cardizem IV bolus, 2 g calcium gluconate  - Continue heparin gtt   - Continue Cardizem p.o.  - Continue telemetry    Elevated troponin  - Troponin 50.9, trend   - EKG reviewed   - Cardiology consulted   - Continue tele        Anticipated Discharge: 5/5    DVT Prophylaxis:  []Lovenox  []Hep SQ  []SCDs  []Coumadin []DOAC  [x]On Heparin gtt     I have personally reviewed all pertinent labs, films and EKGs that have officially resulted. I reviewed available electronic documentation outlining the initial presentation as well as the emergency room physician's encounter. @[email or web address]@    DAREN Santoro - CATE

## 2025-05-03 NOTE — CONSULTS
Endocrinology Consultation Note    Hosptial Problem List:   Principal Problem:    Sepsis (HCC)  Resolved Problems:    * No resolved hospital problems. *    Impressions:     This is a 89-year-old woman admitted with acute on chronic back pain, found to have VIV with concerns for UTI.  During workup, patient was found to have low, but not suppressed TSH with an undetectable free T4.  Patient has a documentation of hypothyroidism and taking levothyroxine 50 mcg p.o. daily.  This could be related to a nonthyroidal illness (sick euthyroid syndrome) versus a central hypothyroidism or noncompliance with her outpatient levothyroxine (however TSH is discordant with her low free T4 if this were to be the case).  Potentially patient could have a transient central hypothyroidism secondary to critical illness; however, typically TSH is not so low and free T4 is not typically undetectable.  Patient had some labs drawn as part of a pituitary workup; however, cortisol and ACTH were not ordered and a.m. cortisol levels can help to distinguish if there is a concern for a pituitary issue versus a sick euthyroid syndrome.    Recommendations:     -For now would continue levothyroxine 100 mcg IV daily, until patient has had two consecutive free T4 values in the LLN before transitioning over to p.o. formulation.  Many times patients with hypothyroidism can have significant gut edema, which can decrease the absorption of p.o. levothyroxine.    - Would continue to check free T4 on a daily basis to see when transition to p.o. levothyroxine would be appropriate.    - Would follow-up on IGF-I, FSH/LH, prolactin and TPO antibodies to see if there is an issue with a central hypothyroidism and/or underlying thyroid disease.    - Would add total T4 and total T3 2 admission labs done to be able to help distinguish between sick euthyroid syndrome versus central hypothyroidism versus potential hyperthyroidism/thyrotoxicosis.    - Would draw ACTH and

## 2025-05-04 PROBLEM — K68.12 PSOAS ABSCESS (HCC): Status: ACTIVE | Noted: 2025-05-04

## 2025-05-04 PROBLEM — E03.8 OTHER SPECIFIED HYPOTHYROIDISM: Status: ACTIVE | Noted: 2025-05-04

## 2025-05-04 PROBLEM — M46.26 OSTEOMYELITIS OF LUMBAR VERTEBRA (HCC): Status: ACTIVE | Noted: 2025-05-04

## 2025-05-04 PROBLEM — E87.20 LACTIC ACIDEMIA: Status: ACTIVE | Noted: 2025-05-04

## 2025-05-04 PROBLEM — B02.9 HERPES ZOSTER WITHOUT COMPLICATION: Status: ACTIVE | Noted: 2025-05-04

## 2025-05-04 PROBLEM — E87.1 HYPONATREMIA: Status: ACTIVE | Noted: 2025-05-04

## 2025-05-04 PROBLEM — E03.9 HYPOTHYROIDISM: Status: ACTIVE | Noted: 2025-05-04

## 2025-05-04 PROBLEM — I48.0 PAROXYSMAL ATRIAL FIBRILLATION (HCC): Status: ACTIVE | Noted: 2025-05-04

## 2025-05-04 PROBLEM — N17.9 ACUTE KIDNEY INJURY: Status: ACTIVE | Noted: 2025-05-04

## 2025-05-04 PROBLEM — R78.81 BACTEREMIA: Status: ACTIVE | Noted: 2025-05-04

## 2025-05-04 LAB
ALBUMIN SERPL-MCNC: 1.7 G/DL (ref 3.4–5)
ALBUMIN/GLOB SERPL: 0.4 (ref 0.8–1.7)
ALP SERPL-CCNC: 196 U/L (ref 45–117)
ALT SERPL-CCNC: 19 U/L (ref 10–35)
ANION GAP SERPL CALC-SCNC: 22 MMOL/L (ref 3–18)
APTT PPP: 84.7 SEC (ref 23–36.4)
AST SERPL-CCNC: 35 U/L (ref 10–38)
BASOPHILS # BLD: 0 K/UL (ref 0–0.1)
BASOPHILS NFR BLD: 0 % (ref 0–2)
BILIRUB SERPL-MCNC: 0.2 MG/DL (ref 0.2–1)
BUN SERPL-MCNC: 84 MG/DL (ref 6–23)
BUN/CREAT SERPL: 24 (ref 12–20)
CALCIUM SERPL-MCNC: 7.8 MG/DL (ref 8.5–10.1)
CHLORIDE SERPL-SCNC: 97 MMOL/L (ref 98–107)
CO2 SERPL-SCNC: 11 MMOL/L (ref 21–32)
CORTIS AM PEAK SERPL-MCNC: 28.2 UG/DL (ref 4.3–22.45)
CREAT SERPL-MCNC: 3.54 MG/DL (ref 0.6–1.3)
DIFFERENTIAL METHOD BLD: ABNORMAL
EKG ATRIAL RATE: 80 BPM
EKG ATRIAL RATE: 89 BPM
EKG DIAGNOSIS: NORMAL
EKG DIAGNOSIS: NORMAL
EKG P AXIS: 80 DEGREES
EKG P AXIS: 9 DEGREES
EKG P-R INTERVAL: 128 MS
EKG P-R INTERVAL: 160 MS
EKG Q-T INTERVAL: 402 MS
EKG Q-T INTERVAL: 412 MS
EKG QRS DURATION: 70 MS
EKG QRS DURATION: 86 MS
EKG QTC CALCULATION (BAZETT): 475 MS
EKG QTC CALCULATION (BAZETT): 489 MS
EKG R AXIS: -3 DEGREES
EKG R AXIS: 32 DEGREES
EKG T AXIS: -35 DEGREES
EKG T AXIS: 30 DEGREES
EKG VENTRICULAR RATE: 80 BPM
EKG VENTRICULAR RATE: 89 BPM
EOSINOPHIL # BLD: 0.25 K/UL (ref 0–0.4)
EOSINOPHIL NFR BLD: 1 % (ref 0–5)
ERYTHROCYTE [DISTWIDTH] IN BLOOD BY AUTOMATED COUNT: 16.7 % (ref 11.6–14.5)
GLOBULIN SER CALC-MCNC: 4 G/DL (ref 2–4)
GLUCOSE SERPL-MCNC: 177 MG/DL (ref 74–108)
HCT VFR BLD AUTO: 24.7 % (ref 35–45)
HGB BLD-MCNC: 9 G/DL (ref 12–16)
IMM GRANULOCYTES # BLD AUTO: 0 K/UL (ref 0–0.04)
IMM GRANULOCYTES NFR BLD AUTO: 0 % (ref 0–0.5)
LYMPHOCYTES # BLD: 3.5 K/UL (ref 0.9–3.6)
LYMPHOCYTES NFR BLD: 14 % (ref 21–52)
MCH RBC QN AUTO: 32.6 PG (ref 24–34)
MCHC RBC AUTO-ENTMCNC: 36.4 G/DL (ref 31–37)
MCV RBC AUTO: 89.5 FL (ref 78–100)
MONOCYTES # BLD: 3.25 K/UL (ref 0.05–1.2)
MONOCYTES NFR BLD: 13 % (ref 3–10)
MYELOCYTES NFR BLD MANUAL: 1 %
NEUTS BAND NFR BLD MANUAL: 1 %
NEUTS SEG # BLD: 17.75 K/UL (ref 1.8–8)
NEUTS SEG NFR BLD: 70 % (ref 40–73)
NRBC # BLD: 0.08 K/UL (ref 0–0.01)
NRBC BLD-RTO: 0.3 PER 100 WBC
PLATELET # BLD AUTO: 507 K/UL (ref 135–420)
PLATELET COMMENT: ABNORMAL
PMV BLD AUTO: 10.3 FL (ref 9.2–11.8)
POTASSIUM SERPL-SCNC: 4.3 MMOL/L (ref 3.5–5.5)
PROT SERPL-MCNC: 5.7 G/DL (ref 6.4–8.2)
RBC # BLD AUTO: 2.76 M/UL (ref 4.2–5.3)
RBC MORPH BLD: ABNORMAL
SODIUM SERPL-SCNC: 130 MMOL/L (ref 136–145)
T4 FREE SERPL-MCNC: 0.8 NG/DL (ref 0.9–1.7)
T4 SERPL-MCNC: 5.3 UG/DL (ref 4.8–13.9)
THYROPEROXIDASE AB SERPL-ACNC: 12 IU/ML (ref 0–34)
TROPONIN T SERPL HS-MCNC: 56.5 NG/L (ref 0–14)
WBC # BLD AUTO: 25 K/UL (ref 4.6–13.2)

## 2025-05-04 PROCEDURE — 6360000002 HC RX W HCPCS: Performed by: STUDENT IN AN ORGANIZED HEALTH CARE EDUCATION/TRAINING PROGRAM

## 2025-05-04 PROCEDURE — 6370000000 HC RX 637 (ALT 250 FOR IP): Performed by: NURSE PRACTITIONER

## 2025-05-04 PROCEDURE — 2500000003 HC RX 250 WO HCPCS: Performed by: INTERNAL MEDICINE

## 2025-05-04 PROCEDURE — 85025 COMPLETE CBC W/AUTO DIFF WBC: CPT

## 2025-05-04 PROCEDURE — 84439 ASSAY OF FREE THYROXINE: CPT

## 2025-05-04 PROCEDURE — 2500000003 HC RX 250 WO HCPCS: Performed by: STUDENT IN AN ORGANIZED HEALTH CARE EDUCATION/TRAINING PROGRAM

## 2025-05-04 PROCEDURE — 36415 COLL VENOUS BLD VENIPUNCTURE: CPT

## 2025-05-04 PROCEDURE — 82533 TOTAL CORTISOL: CPT

## 2025-05-04 PROCEDURE — 2580000003 HC RX 258: Performed by: INTERNAL MEDICINE

## 2025-05-04 PROCEDURE — 85730 THROMBOPLASTIN TIME PARTIAL: CPT

## 2025-05-04 PROCEDURE — 99233 SBSQ HOSP IP/OBS HIGH 50: CPT | Performed by: STUDENT IN AN ORGANIZED HEALTH CARE EDUCATION/TRAINING PROGRAM

## 2025-05-04 PROCEDURE — 93010 ELECTROCARDIOGRAM REPORT: CPT | Performed by: INTERNAL MEDICINE

## 2025-05-04 PROCEDURE — 97530 THERAPEUTIC ACTIVITIES: CPT

## 2025-05-04 PROCEDURE — 80053 COMPREHEN METABOLIC PANEL: CPT

## 2025-05-04 PROCEDURE — 84484 ASSAY OF TROPONIN QUANT: CPT

## 2025-05-04 PROCEDURE — 94761 N-INVAS EAR/PLS OXIMETRY MLT: CPT

## 2025-05-04 PROCEDURE — 2580000003 HC RX 258: Performed by: STUDENT IN AN ORGANIZED HEALTH CARE EDUCATION/TRAINING PROGRAM

## 2025-05-04 PROCEDURE — 6360000002 HC RX W HCPCS: Performed by: NURSE PRACTITIONER

## 2025-05-04 PROCEDURE — 82024 ASSAY OF ACTH: CPT

## 2025-05-04 PROCEDURE — 97162 PT EVAL MOD COMPLEX 30 MIN: CPT

## 2025-05-04 PROCEDURE — 2580000003 HC RX 258: Performed by: NURSE PRACTITIONER

## 2025-05-04 PROCEDURE — 2500000003 HC RX 250 WO HCPCS: Performed by: NURSE PRACTITIONER

## 2025-05-04 PROCEDURE — 1100000003 HC PRIVATE W/ TELEMETRY

## 2025-05-04 RX ORDER — LEVOFLOXACIN 5 MG/ML
500 INJECTION, SOLUTION INTRAVENOUS
Status: DISCONTINUED | OUTPATIENT
Start: 2025-05-04 | End: 2025-05-05

## 2025-05-04 RX ORDER — METOPROLOL TARTRATE 1 MG/ML
2.5 INJECTION, SOLUTION INTRAVENOUS EVERY 6 HOURS
Status: DISCONTINUED | OUTPATIENT
Start: 2025-05-04 | End: 2025-05-05

## 2025-05-04 RX ADMIN — METOPROLOL TARTRATE 2.5 MG: 5 INJECTION INTRAVENOUS at 21:39

## 2025-05-04 RX ADMIN — VANCOMYCIN HYDROCHLORIDE 750 MG: 750 INJECTION, POWDER, LYOPHILIZED, FOR SOLUTION INTRAVENOUS at 20:47

## 2025-05-04 RX ADMIN — DEXTROSE AND SODIUM CHLORIDE: 5; .9 INJECTION, SOLUTION INTRAVENOUS at 06:20

## 2025-05-04 RX ADMIN — HYDROMORPHONE HYDROCHLORIDE 0.5 MG: 1 INJECTION, SOLUTION INTRAMUSCULAR; INTRAVENOUS; SUBCUTANEOUS at 11:54

## 2025-05-04 RX ADMIN — SODIUM CHLORIDE, PRESERVATIVE FREE 10 ML: 5 INJECTION INTRAVENOUS at 12:26

## 2025-05-04 RX ADMIN — HYDROMORPHONE HYDROCHLORIDE 0.5 MG: 1 INJECTION, SOLUTION INTRAMUSCULAR; INTRAVENOUS; SUBCUTANEOUS at 21:29

## 2025-05-04 RX ADMIN — LEVOFLOXACIN 500 MG: 500 INJECTION, SOLUTION INTRAVENOUS at 17:05

## 2025-05-04 RX ADMIN — LEVOTHYROXINE SODIUM ANHYDROUS 100 MCG: 100 INJECTION, POWDER, LYOPHILIZED, FOR SOLUTION INTRAVENOUS at 06:24

## 2025-05-04 RX ADMIN — HYDROMORPHONE HYDROCHLORIDE 0.5 MG: 1 INJECTION, SOLUTION INTRAMUSCULAR; INTRAVENOUS; SUBCUTANEOUS at 16:54

## 2025-05-04 RX ADMIN — SODIUM BICARBONATE: 84 INJECTION, SOLUTION INTRAVENOUS at 20:41

## 2025-05-04 RX ADMIN — HYDROMORPHONE HYDROCHLORIDE 0.25 MG: 1 INJECTION, SOLUTION INTRAMUSCULAR; INTRAVENOUS; SUBCUTANEOUS at 04:25

## 2025-05-04 RX ADMIN — CEFEPIME 1000 MG: 1 INJECTION, POWDER, FOR SOLUTION INTRAMUSCULAR; INTRAVENOUS at 17:23

## 2025-05-04 ASSESSMENT — PAIN SCALES - PAIN ASSESSMENT IN ADVANCED DEMENTIA (PAINAD)
BREATHING: NORMAL
FACIALEXPRESSION: FACIAL GRIMACING
BODYLANGUAGE: TENSE, DISTRESSED PACING, FIDGETING
CONSOLABILITY: DISTRACTED OR REASSURED BY VOICE/TOUCH
TOTALSCORE: 5
BODYLANGUAGE: TENSE, DISTRESSED PACING, FIDGETING
CONSOLABILITY: DISTRACTED OR REASSURED BY VOICE/TOUCH
BODYLANGUAGE: TENSE, DISTRESSED PACING, FIDGETING
BREATHING: OCCASIONAL LABORED BREATHING, SHORT PERIOD OF HYPERVENTILATION
BREATHING: OCCASIONAL LABORED BREATHING, SHORT PERIOD OF HYPERVENTILATION
FACIALEXPRESSION: SAD, FRIGHTENED, FROWN
FACIALEXPRESSION: SAD, FRIGHTENED, FROWN
NEGVOCALIZATION: OCCASIONAL MOAN/GROAN, LOW SPEECH, NEGATIVE/DISAPPROVING QUALITY
NEGVOCALIZATION: OCCASIONAL MOAN/GROAN, LOW SPEECH, NEGATIVE/DISAPPROVING QUALITY
TOTALSCORE: 8
NEGVOCALIZATION: REPEATED TROUBLED CALLING OUT, LOUD MOANING/GROANING, CRYING
CONSOLABILITY: UNABLE TO CONSOLE, DISTRACT OR REASSURE

## 2025-05-04 ASSESSMENT — PAIN SCALES - WONG BAKER
WONGBAKER_NUMERICALRESPONSE: NO HURT

## 2025-05-04 ASSESSMENT — PAIN SCALES - GENERAL
PAINLEVEL_OUTOF10: 0
PAINLEVEL_OUTOF10: 5
PAINLEVEL_OUTOF10: 0

## 2025-05-04 NOTE — CONSULTS
[facility], [facility]  [address]   [address]  Phone:[phone]  Fax: [phone]  Cardiology Consult Note   Admission Date & Time  5/2/2025  7:26 AM    Requesting Physician: Gian Gilman MD   Reason for Consult: elevated troponin    HPI: Name:     Yee Alegria          Age:         89 y.o.           Gender:   female          Ethnicity:      Ms. Alegria is referred for evaluation of elevated troponin.  Her grandson is at the bedside.  She resides with her son.  She is a limited historian and reportedly has a hard time hearing.  According to her grandson she presented with complaint of severe pain all over with limited appetite over the past week and a half.  He states she took her to the to an office appointment a couple of months ago and she walked from the car to the building.  He states she typically gets up and walks and socializes for few minutes and goes back to her room.  This is reportedly her baseline.  There is no complaint of chest pain or shortness of breath although history is limited.  There is no reported history of falls.    EKG showed sinus rhythm heart rate 100 with nondiagnostic ST-T changes.  Repeat EKG showed atrial fibrillation heart rate 151 with nonspecific ST-T changes.  EKG #3 showed sinus rhythm heart rate 89 nondiagnostic ST changes.  An EKG #4 shows sinus rhythm heart rate 88 nondiagnostic ST changes.  Laboratory BUN 84 creatinine 3.5 potassium 4.3 lactic acid 4.17 trended to 2.2 previous BUN was 22 with a creatinine of 1.15 November 30, 2023.  Current troponin was 40 trended to 63 CK 90-88.  Chest x-ray showed showed subtle increased interstitial vascular prominence in bilateral hilar regions.         Current Medications:  Current Facility-Administered Medications   Medication Dose Route Frequency    HYDROmorphone (DILAUDID) injection 0.5 mg  0.5 mg IntraVENous Q4H PRN    metoprolol (LOPRESSOR)

## 2025-05-05 ENCOUNTER — APPOINTMENT (OUTPATIENT)
Facility: HOSPITAL | Age: 89
DRG: 871 | End: 2025-05-05
Attending: INTERNAL MEDICINE
Payer: MEDICARE

## 2025-05-05 PROBLEM — M46.26 ACUTE OSTEOMYELITIS OF LUMBAR SPINE (HCC): Status: ACTIVE | Noted: 2025-05-05

## 2025-05-05 PROBLEM — B96.5 BACTEREMIA DUE TO PSEUDOMONAS: Status: ACTIVE | Noted: 2025-05-05

## 2025-05-05 PROBLEM — Z51.5 ENCOUNTER FOR PALLIATIVE CARE: Status: ACTIVE | Noted: 2025-05-05

## 2025-05-05 PROBLEM — Z71.89 GOALS OF CARE, COUNSELING/DISCUSSION: Status: ACTIVE | Noted: 2025-05-05

## 2025-05-05 PROBLEM — R78.81 BACTEREMIA DUE TO PSEUDOMONAS: Status: ACTIVE | Noted: 2025-05-05

## 2025-05-05 PROBLEM — M86.9 PYOGENIC INFLAMMATION OF BONE (HCC): Status: ACTIVE | Noted: 2025-05-05

## 2025-05-05 PROBLEM — R54 ADVANCED AGE: Status: ACTIVE | Noted: 2025-05-05

## 2025-05-05 LAB
ALBUMIN SERPL-MCNC: 1.8 G/DL (ref 3.4–5)
ALBUMIN/GLOB SERPL: 0.5 (ref 0.8–1.7)
ALP SERPL-CCNC: 183 U/L (ref 45–117)
ALT SERPL-CCNC: 36 U/L (ref 10–35)
ANION GAP SERPL CALC-SCNC: 20 MMOL/L (ref 3–18)
APTT PPP: 37.5 SEC (ref 23–36.4)
AST SERPL-CCNC: 57 U/L (ref 10–38)
BACTERIA SPEC CULT: ABNORMAL
BASOPHILS # BLD: 0 K/UL (ref 0–0.1)
BASOPHILS NFR BLD: 0 % (ref 0–2)
BILIRUB SERPL-MCNC: 0.3 MG/DL (ref 0.2–1)
BUN SERPL-MCNC: 84 MG/DL (ref 6–23)
BUN/CREAT SERPL: 28 (ref 12–20)
CALCIUM SERPL-MCNC: 8 MG/DL (ref 8.5–10.1)
CHLORIDE SERPL-SCNC: 100 MMOL/L (ref 98–107)
CO2 SERPL-SCNC: 15 MMOL/L (ref 21–32)
CREAT SERPL-MCNC: 2.97 MG/DL (ref 0.6–1.3)
DIFFERENTIAL METHOD BLD: ABNORMAL
ECHO AO ASC DIAM: 3.2 CM
ECHO AO ASCENDING AORTA INDEX: 1.76 CM/M2
ECHO AO ROOT DIAM: 2.9 CM
ECHO AO ROOT INDEX: 1.59 CM/M2
ECHO AV AREA PEAK VELOCITY: 2.2 CM2
ECHO AV AREA VTI: 2.2 CM2
ECHO AV AREA/BSA PEAK VELOCITY: 1.2 CM2/M2
ECHO AV AREA/BSA VTI: 1.2 CM2/M2
ECHO AV MEAN GRADIENT: 8 MMHG
ECHO AV MEAN VELOCITY: 1.3 M/S
ECHO AV PEAK GRADIENT: 17 MMHG
ECHO AV PEAK VELOCITY: 2.1 M/S
ECHO AV VELOCITY RATIO: 0.81
ECHO AV VTI: 38.8 CM
ECHO BSA: 1.83 M2
ECHO EST RA PRESSURE: 3 MMHG
ECHO LA VOL A-L A2C: 63 ML (ref 22–52)
ECHO LA VOL A-L A4C: 48 ML (ref 22–52)
ECHO LA VOL BP: 52 ML (ref 22–52)
ECHO LA VOL MOD A2C: 59 ML (ref 22–52)
ECHO LA VOL MOD A4C: 45 ML (ref 22–52)
ECHO LA VOL/BSA BIPLANE: 29 ML/M2 (ref 16–34)
ECHO LA VOLUME AREA LENGTH: 56 ML
ECHO LA VOLUME INDEX A-L A2C: 35 ML/M2 (ref 16–34)
ECHO LA VOLUME INDEX A-L A4C: 26 ML/M2 (ref 16–34)
ECHO LA VOLUME INDEX AREA LENGTH: 31 ML/M2 (ref 16–34)
ECHO LA VOLUME INDEX MOD A2C: 32 ML/M2 (ref 16–34)
ECHO LA VOLUME INDEX MOD A4C: 25 ML/M2 (ref 16–34)
ECHO LV E' LATERAL VELOCITY: 9.6 CM/S
ECHO LV E' SEPTAL VELOCITY: 8.16 CM/S
ECHO LV EDV A2C: 45 ML
ECHO LV EDV A4C: 63 ML
ECHO LV EDV BP: 53 ML (ref 56–104)
ECHO LV EDV INDEX A4C: 35 ML/M2
ECHO LV EDV INDEX BP: 29 ML/M2
ECHO LV EDV NDEX A2C: 25 ML/M2
ECHO LV EF PHYSICIAN: 68 %
ECHO LV EJECTION FRACTION A2C: 66 %
ECHO LV EJECTION FRACTION A4C: 75 %
ECHO LV EJECTION FRACTION BIPLANE: 70 % (ref 55–100)
ECHO LV ESV A2C: 15 ML
ECHO LV ESV A4C: 16 ML
ECHO LV ESV BP: 16 ML (ref 19–49)
ECHO LV ESV INDEX A2C: 8 ML/M2
ECHO LV ESV INDEX A4C: 9 ML/M2
ECHO LV ESV INDEX BP: 9 ML/M2
ECHO LV FRACTIONAL SHORTENING: 33 % (ref 28–44)
ECHO LV INTERNAL DIMENSION DIASTOLE INDEX: 2.36 CM/M2
ECHO LV INTERNAL DIMENSION DIASTOLIC: 4.3 CM (ref 3.9–5.3)
ECHO LV INTERNAL DIMENSION SYSTOLIC INDEX: 1.59 CM/M2
ECHO LV INTERNAL DIMENSION SYSTOLIC: 2.9 CM
ECHO LV IVSD: 0.9 CM (ref 0.6–0.9)
ECHO LV MASS 2D: 105.3 G (ref 67–162)
ECHO LV MASS INDEX 2D: 57.9 G/M2 (ref 43–95)
ECHO LV POSTERIOR WALL DIASTOLIC: 0.7 CM (ref 0.6–0.9)
ECHO LV RELATIVE WALL THICKNESS RATIO: 0.33
ECHO LVOT AREA: 2.8 CM2
ECHO LVOT AV VTI INDEX: 0.81
ECHO LVOT DIAM: 1.9 CM
ECHO LVOT MEAN GRADIENT: 5 MMHG
ECHO LVOT PEAK GRADIENT: 11 MMHG
ECHO LVOT PEAK VELOCITY: 1.7 M/S
ECHO LVOT STROKE VOLUME INDEX: 49 ML/M2
ECHO LVOT SV: 89.3 ML
ECHO LVOT VTI: 31.5 CM
ECHO MV A VELOCITY: 1.12 M/S
ECHO MV AREA VTI: 3.2 CM2
ECHO MV E DECELERATION TIME (DT): 211.1 MS
ECHO MV E VELOCITY: 0.84 M/S
ECHO MV E/A RATIO: 0.75
ECHO MV E/E' LATERAL: 8.75
ECHO MV E/E' RATIO (AVERAGED): 9.52
ECHO MV E/E' SEPTAL: 10.29
ECHO MV LVOT VTI INDEX: 0.88
ECHO MV MAX VELOCITY: 1.3 M/S
ECHO MV MEAN GRADIENT: 2 MMHG
ECHO MV MEAN VELOCITY: 0.7 M/S
ECHO MV PEAK GRADIENT: 7 MMHG
ECHO MV VTI: 27.6 CM
ECHO PV MAX VELOCITY: 1.6 M/S
ECHO PV PEAK GRADIENT: 10 MMHG
ECHO RA AREA 4C: 13.1 CM2
ECHO RA END SYSTOLIC VOLUME APICAL 4 CHAMBER INDEX BSA: 15 ML/M2
ECHO RA VOLUME AREA LENGTH APICAL 4 CHAMBER: 28 ML
ECHO RA VOLUME: 28 ML
ECHO RIGHT VENTRICULAR SYSTOLIC PRESSURE (RVSP): 48 MMHG
ECHO RV BASAL DIMENSION: 3.2 CM
ECHO RV FREE WALL PEAK S': 18.9 CM/S
ECHO RV TAPSE: 2 CM (ref 1.7–?)
ECHO RVOT PEAK GRADIENT: 6 MMHG
ECHO RVOT PEAK VELOCITY: 1.3 M/S
ECHO TV REGURGITANT MAX VELOCITY: 3.35 M/S
ECHO TV REGURGITANT PEAK GRADIENT: 45 MMHG
EOSINOPHIL # BLD: 0.26 K/UL (ref 0–0.4)
EOSINOPHIL NFR BLD: 1 % (ref 0–5)
ERYTHROCYTE [DISTWIDTH] IN BLOOD BY AUTOMATED COUNT: 16.7 % (ref 11.6–14.5)
GLOBULIN SER CALC-MCNC: 3.7 G/DL (ref 2–4)
GLUCOSE SERPL-MCNC: 150 MG/DL (ref 74–108)
GRAM STN SPEC: ABNORMAL
HCT VFR BLD AUTO: 25.9 % (ref 35–45)
HGB BLD-MCNC: 9.2 G/DL (ref 12–16)
IGF-I SERPL-MCNC: 50 NG/ML
IMM GRANULOCYTES # BLD AUTO: 0 K/UL (ref 0–0.04)
IMM GRANULOCYTES NFR BLD AUTO: 0 % (ref 0–0.5)
LYMPHOCYTES # BLD: 1.84 K/UL (ref 0.9–3.6)
LYMPHOCYTES NFR BLD: 7 % (ref 21–52)
MAGNESIUM SERPL-MCNC: 2.4 MG/DL (ref 1.6–2.6)
MCH RBC QN AUTO: 32.5 PG (ref 24–34)
MCHC RBC AUTO-ENTMCNC: 35.5 G/DL (ref 31–37)
MCV RBC AUTO: 91.5 FL (ref 78–100)
MONOCYTES # BLD: 1.84 K/UL (ref 0.05–1.2)
MONOCYTES NFR BLD: 7 % (ref 3–10)
NEUTS SEG # BLD: 22.36 K/UL (ref 1.8–8)
NEUTS SEG NFR BLD: 85 % (ref 40–73)
NRBC # BLD: 0.04 K/UL (ref 0–0.01)
NRBC BLD-RTO: 0.2 PER 100 WBC
PHOSPHATE SERPL-MCNC: 5.8 MG/DL (ref 2.5–4.9)
PLATELET # BLD AUTO: 510 K/UL (ref 135–420)
PLATELET COMMENT: ABNORMAL
PMV BLD AUTO: 10.3 FL (ref 9.2–11.8)
POTASSIUM SERPL-SCNC: 3.9 MMOL/L (ref 3.5–5.5)
PROT SERPL-MCNC: 5.4 G/DL (ref 6.4–8.2)
RBC # BLD AUTO: 2.83 M/UL (ref 4.2–5.3)
RBC MORPH BLD: ABNORMAL
RBC MORPH BLD: ABNORMAL
SERVICE CMNT-IMP: ABNORMAL
SERVICE CMNT-IMP: ABNORMAL
SODIUM SERPL-SCNC: 135 MMOL/L (ref 136–145)
T4 FREE SERPL-MCNC: 1 NG/DL (ref 0.9–1.7)
VANCOMYCIN SERPL-MCNC: 15.2 UG/ML (ref 5–40)
WBC # BLD AUTO: 26.3 K/UL (ref 4.6–13.2)

## 2025-05-05 PROCEDURE — 82024 ASSAY OF ACTH: CPT

## 2025-05-05 PROCEDURE — 99232 SBSQ HOSP IP/OBS MODERATE 35: CPT | Performed by: INTERNAL MEDICINE

## 2025-05-05 PROCEDURE — 2500000003 HC RX 250 WO HCPCS: Performed by: INTERNAL MEDICINE

## 2025-05-05 PROCEDURE — 6370000000 HC RX 637 (ALT 250 FOR IP): Performed by: STUDENT IN AN ORGANIZED HEALTH CARE EDUCATION/TRAINING PROGRAM

## 2025-05-05 PROCEDURE — 85025 COMPLETE CBC W/AUTO DIFF WBC: CPT

## 2025-05-05 PROCEDURE — 85730 THROMBOPLASTIN TIME PARTIAL: CPT

## 2025-05-05 PROCEDURE — 83735 ASSAY OF MAGNESIUM: CPT

## 2025-05-05 PROCEDURE — 6370000000 HC RX 637 (ALT 250 FOR IP)

## 2025-05-05 PROCEDURE — 99223 1ST HOSP IP/OBS HIGH 75: CPT | Performed by: NURSE PRACTITIONER

## 2025-05-05 PROCEDURE — 2500000003 HC RX 250 WO HCPCS: Performed by: NURSE PRACTITIONER

## 2025-05-05 PROCEDURE — 6360000002 HC RX W HCPCS: Performed by: STUDENT IN AN ORGANIZED HEALTH CARE EDUCATION/TRAINING PROGRAM

## 2025-05-05 PROCEDURE — 84100 ASSAY OF PHOSPHORUS: CPT

## 2025-05-05 PROCEDURE — 80202 ASSAY OF VANCOMYCIN: CPT

## 2025-05-05 PROCEDURE — 92610 EVALUATE SWALLOWING FUNCTION: CPT

## 2025-05-05 PROCEDURE — 2500000003 HC RX 250 WO HCPCS: Performed by: STUDENT IN AN ORGANIZED HEALTH CARE EDUCATION/TRAINING PROGRAM

## 2025-05-05 PROCEDURE — 2580000003 HC RX 258: Performed by: STUDENT IN AN ORGANIZED HEALTH CARE EDUCATION/TRAINING PROGRAM

## 2025-05-05 PROCEDURE — 93306 TTE W/DOPPLER COMPLETE: CPT

## 2025-05-05 PROCEDURE — 82533 TOTAL CORTISOL: CPT

## 2025-05-05 PROCEDURE — 6370000000 HC RX 637 (ALT 250 FOR IP): Performed by: NURSE PRACTITIONER

## 2025-05-05 PROCEDURE — 1100000003 HC PRIVATE W/ TELEMETRY

## 2025-05-05 PROCEDURE — 80053 COMPREHEN METABOLIC PANEL: CPT

## 2025-05-05 PROCEDURE — 84439 ASSAY OF FREE THYROXINE: CPT

## 2025-05-05 PROCEDURE — 36415 COLL VENOUS BLD VENIPUNCTURE: CPT

## 2025-05-05 PROCEDURE — 93306 TTE W/DOPPLER COMPLETE: CPT | Performed by: INTERNAL MEDICINE

## 2025-05-05 PROCEDURE — 2580000003 HC RX 258: Performed by: INTERNAL MEDICINE

## 2025-05-05 RX ORDER — GABAPENTIN 100 MG/1
100 CAPSULE ORAL 3 TIMES DAILY
Status: DISCONTINUED | OUTPATIENT
Start: 2025-05-05 | End: 2025-05-07

## 2025-05-05 RX ORDER — METOPROLOL TARTRATE 25 MG/1
25 TABLET, FILM COATED ORAL 2 TIMES DAILY
Status: DISCONTINUED | OUTPATIENT
Start: 2025-05-05 | End: 2025-05-10

## 2025-05-05 RX ADMIN — HYDROMORPHONE HYDROCHLORIDE 0.5 MG: 1 INJECTION, SOLUTION INTRAMUSCULAR; INTRAVENOUS; SUBCUTANEOUS at 13:55

## 2025-05-05 RX ADMIN — SODIUM CHLORIDE, PRESERVATIVE FREE 10 ML: 5 INJECTION INTRAVENOUS at 09:14

## 2025-05-05 RX ADMIN — OXYCODONE HYDROCHLORIDE 10 MG: 10 TABLET ORAL at 20:02

## 2025-05-05 RX ADMIN — METOPROLOL TARTRATE 2.5 MG: 5 INJECTION INTRAVENOUS at 09:11

## 2025-05-05 RX ADMIN — HYDROMORPHONE HYDROCHLORIDE 0.5 MG: 1 INJECTION, SOLUTION INTRAMUSCULAR; INTRAVENOUS; SUBCUTANEOUS at 03:19

## 2025-05-05 RX ADMIN — SODIUM BICARBONATE: 84 INJECTION, SOLUTION INTRAVENOUS at 16:30

## 2025-05-05 RX ADMIN — GABAPENTIN 100 MG: 100 CAPSULE ORAL at 21:40

## 2025-05-05 RX ADMIN — GABAPENTIN 100 MG: 100 CAPSULE ORAL at 16:31

## 2025-05-05 RX ADMIN — METOPROLOL TARTRATE 25 MG: 25 TABLET, FILM COATED ORAL at 16:31

## 2025-05-05 RX ADMIN — METOPROLOL TARTRATE 2.5 MG: 5 INJECTION INTRAVENOUS at 03:14

## 2025-05-05 RX ADMIN — HYDROMORPHONE HYDROCHLORIDE 0.5 MG: 1 INJECTION, SOLUTION INTRAMUSCULAR; INTRAVENOUS; SUBCUTANEOUS at 09:10

## 2025-05-05 RX ADMIN — CEFEPIME 1000 MG: 1 INJECTION, POWDER, FOR SOLUTION INTRAMUSCULAR; INTRAVENOUS at 16:38

## 2025-05-05 RX ADMIN — VANCOMYCIN HYDROCHLORIDE 750 MG: 750 INJECTION, POWDER, LYOPHILIZED, FOR SOLUTION INTRAVENOUS at 11:28

## 2025-05-05 ASSESSMENT — PAIN - FUNCTIONAL ASSESSMENT
PAIN_FUNCTIONAL_ASSESSMENT: ACTIVITIES ARE NOT PREVENTED
PAIN_FUNCTIONAL_ASSESSMENT: PREVENTS OR INTERFERES SOME ACTIVE ACTIVITIES AND ADLS
PAIN_FUNCTIONAL_ASSESSMENT: ACTIVITIES ARE NOT PREVENTED

## 2025-05-05 ASSESSMENT — PAIN SCALES - GENERAL
PAINLEVEL_OUTOF10: 0
PAINLEVEL_OUTOF10: 0
PAINLEVEL_OUTOF10: 10
PAINLEVEL_OUTOF10: 0

## 2025-05-05 ASSESSMENT — PAIN DESCRIPTION - PAIN TYPE
TYPE: ACUTE PAIN

## 2025-05-05 ASSESSMENT — PAIN DESCRIPTION - LOCATION
LOCATION: OTHER (COMMENT)
LOCATION: GENERALIZED

## 2025-05-05 ASSESSMENT — PAIN DESCRIPTION - DESCRIPTORS: DESCRIPTORS: ACHING

## 2025-05-05 ASSESSMENT — PAIN DESCRIPTION - FREQUENCY: FREQUENCY: INTERMITTENT

## 2025-05-05 ASSESSMENT — PAIN DESCRIPTION - ORIENTATION
ORIENTATION: LOWER
ORIENTATION: LOWER
ORIENTATION: ANTERIOR;POSTERIOR

## 2025-05-05 ASSESSMENT — PAIN DESCRIPTION - ONSET: ONSET: GRADUAL

## 2025-05-05 NOTE — CONSULTS
Infectious Disease Consultation Note        Reason:pseudomonas, streptococcus anginosus bacteremia    Current abx Prior abx   Cefepime, vancomycin since 5/2  Levofloxacin since 5/4      Lines:       Assessment :  89 y.o. female with a PMHx of hypertension, hyperlipidemia, and SVT, and chronic left hip pain who presented to the ED on 5/2/25 with worsening low back pain.       Clinical presentation consistent with sepsis (present on admission-as evidenced by leukocytosis, tachypnea, tachycardia, altered mentation) due to Pseudomonas, Streptococcus anginosus bacteremia; lumbar spine discitis/osteomyelitis, right psoas/iliacus muscle abscess      Streptococcus anginosus, Pseudomonas bacteremia could be due to respiratory tract illness couple weeks ago with subsequent seeding of the lumbar spine    Worsening mental status-likely metabolic encephalopathy secondary to sepsis.  Monitor for cefepime induced encephalopathy    Acute kidney injury-likely due to sepsis    Recommendations:    Continue cefepime.  Discontinue levofloxacin, vancomycin  Agree with ongoing discussion regarding goals of care with the family since patient is at high risk for clinical deterioration  Recommend transferring patient to tertiary care center for spine surgery evaluation if family wishes to continue aggressive measures.     Thank you for consultation request. Above plan was discussed in details with son, grandson at bedside and dr Gilman. Please call me if any further questions or concerns. Will continue to participate in the care of this patient.  HPI:    89 y.o. female with a PMHx of hypertension, hyperlipidemia, and SVT, and chronic left hip pain who presented to the ED on 5/2/25 with worsening low back pain.     I obtained history by talking to son at bedside.  Patient's son visits her everyday.  She appeared to be weak for the past couple weeks. Up until past couple weeks, she was independent in ADL, used to drive and walk with a

## 2025-05-05 NOTE — ACP (ADVANCE CARE PLANNING)
Advance Care Planning     Palliative Team Advance Care Planning (ACP) Conversation    Date of Conversation: 05/05/25    Individuals present for the conversation: Patient, Son Jack Alegria, and Grandson Ryan Alegria     ACP documents on file prior to discussion:  -None    Previously completed document/s not on file: Patient / participant reports that there are no previously executed ACP documents.    Healthcare Decision Maker:    Primary Decision Maker: Jack Alegria - Child - [phone] . Patient has another son who resides in [place]. According to family He is up to date on medical issues and would agree with the decision for DNR/DNI.       Conversation Summary:  Palliative Medicine Team visited with patient x 2 today.   Yee Alegria is a 89 y.o. female with a medical h/o of HTN, SVT, chronic left hip pain.  Patient was admitted on 5/2/2025 from home with a diagnosis of sepsis.  It was determined patient had vertebral osteomyelitis/discitis, CKD with elevated BUN/Creat.     Palliative Medicine team meet with patient at bedside. Ms Alegria was not aware of place, time and situation. She was not able to share who her children were when asked.    Palliative Medicine team return to meet with patient's family, Attending MD was present.  The Attending provided a medical update to the family including the burdens of the interventions to a patient with advanced age and infection. Triston shared they needed time to consider the options.  Palliative team discussed goals of care including intubation and CPR.  Discussed the benefits and burdens of intubation and CPR in the event of respiratory decline or cardiopulmonary arrest in a person with advanced age and infection. They asked questions and team answered them to the best of the teams ability. They agree they do not want patient to endure any pain.  They agree with no CPR or intubation. Team introduced the completion of the DDNR.  Mr Jack Alegria stated

## 2025-05-05 NOTE — CONSULTS
Palliative Medicine  Patient Name: Yee Alegria  YOB: 1928  MRN: [id number]  Age: 89 y.o.  Gender: female    Date of Initial Consult: 5/5/2025   Date of Service: 5/5/2025  Time: 2:21 PM  Provider: DAREN Murrieta NP  Hospital Day: 4  Admit Date: 5/2/2025  Referring Provider: Dr Gilman        Reasons for Consultation:  Goals of Care    HISTORY OF PRESENT ILLNESS (HPI):   Yee Alegria is a 89 y.o. female with a past medical history of hypertension, SVT, chronic left hip pain, who was admitted on 5/2/2025 from home with a diagnosis of sepsis..  She was found to have vertebral osteomyelitis/discitis with possible right psoas and iliacus abscess/hematoma.  Patient presented from home via EMS with lower back pain.  Family shares she is normally ambulatory around the house but for the past several days has not been due to pain.  Palliative medicine is consulted for goals of care discussions.    5/5/2025 patient is very hard of hearing was not able to answer questions speech at times not understandable to us.  However, she did say she was in pain but could not localize.          PALLIATIVE DIAGNOSES:    Encounter for palliative care/advance care plan discussions  Goals of care  Osteomyelitis  Sepsis  Advanced age    ASSESSMENT AND PLAN:   Encounter for palliative care/advance care planning discussions    May 5, 2025 patient seen along with Ms. Brooks RN.  Have seen patient twice today.  We note she is very hard of hearing however even with hearing aids in and in close proximity she was not able to answer any of her questions.  Currently she is not able to participate in her goals of care discussions.  Her family including son Jack and grandson Ryan later arrived at the bedside.  We were able to meet with the family and attending.  Patient's overall prognosis was discussed with son and grandson.    AMD there is no AMD on file and patient is not currently able to complete.  She is not

## 2025-05-05 NOTE — CONSULTS
Consult requested by: Gian Gilman MD   Yee Alegria is a 89 y.o. female Black /  who is being seen on consult for VIV  Chief Complaint   Patient presents with    Back Pain     Admission diagnosis: Sepsis (HCC)     HPI:    89 yr old with pmhx of hyperlipidemia,hypertension,svt,bradycardia with verapamil presents in to ED with back pain and found to have   Vertebral osteomyelitis,discitis, right psoas and iliac abscess (on MRI L spine). Patient was started on iv abx for this. Patient's family want aggressive care in her. She is very hard of hearing. She presented with VIV with cr of 3.08. Her Cr back from 2023 was 1.15 but no results in between.she was taking ibuprofen for her back pain recently.she was acidotic on presentation with Co2 around 11 on BMP. Her lactic acid was elevated at 2.2.Had leucocytosis and findings of sepsis.        Past Medical History:   Diagnosis Date    Benign hypertensive heart disease without heart failure     Bradycardia     with verapamil    History of echocardiogram 02/13/2007    [facility]:  EF 65-70%.  No WMA.  Mild LVH.  No significant valve abnormalities.    Holter monitor, abnormal 11/10/2016    Sinus rhythm, avg 71 bpm (range 57-96 bpm).  Occasional SVEs (mainly single, w/paired and 9 runs of SVT, max 8 beats).      Hypercholesteremia     Palpitations       Past Surgical History:   Procedure Laterality Date    CATARACT REMOVAL  2001    bilateral       Social History     Socioeconomic History    Marital status:      Spouse name: Not on file    Number of children: Not on file    Years of education: Not on file    Highest education level: Not on file   Occupational History    Not on file   Tobacco Use    Smoking status: Never    Smokeless tobacco: Never   Vaping Use    Vaping status: Not on file   Substance and Sexual Activity    Alcohol use: Yes    Drug use: No    Sexual activity: Not on file   Other Topics Concern    Not on file

## 2025-05-06 LAB
ACTH PLAS-MCNC: 15.8 PG/ML (ref 7.2–63.3)
ACTH PLAS-MCNC: 17.6 PG/ML (ref 7.2–63.3)
ALBUMIN SERPL-MCNC: 1.8 G/DL (ref 3.4–5)
ANION GAP SERPL CALC-SCNC: 18 MMOL/L (ref 3–18)
APTT PPP: 34.6 SEC (ref 23–36.4)
BASOPHILS # BLD: 0.06 K/UL (ref 0–0.1)
BASOPHILS NFR BLD: 0.2 % (ref 0–2)
BUN SERPL-MCNC: 83 MG/DL (ref 6–23)
BUN/CREAT SERPL: 32 (ref 12–20)
CALCIUM SERPL-MCNC: 8.9 MG/DL (ref 8.5–10.1)
CHLORIDE SERPL-SCNC: 101 MMOL/L (ref 98–107)
CO2 SERPL-SCNC: 17 MMOL/L (ref 21–32)
CORTIS AM PEAK SERPL-MCNC: 34.9 UG/DL (ref 4.3–22.45)
CREAT SERPL-MCNC: 2.61 MG/DL (ref 0.6–1.3)
DIFFERENTIAL METHOD BLD: ABNORMAL
EOSINOPHIL # BLD: 0.11 K/UL (ref 0–0.4)
EOSINOPHIL NFR BLD: 0.4 % (ref 0–5)
ERYTHROCYTE [DISTWIDTH] IN BLOOD BY AUTOMATED COUNT: 16.3 % (ref 11.6–14.5)
GLUCOSE SERPL-MCNC: 143 MG/DL (ref 74–108)
HCT VFR BLD AUTO: 26.5 % (ref 35–45)
HGB BLD-MCNC: 9.1 G/DL (ref 12–16)
IMM GRANULOCYTES # BLD AUTO: 0.82 K/UL (ref 0–0.04)
IMM GRANULOCYTES NFR BLD AUTO: 3.1 % (ref 0–0.5)
LYMPHOCYTES # BLD: 2.54 K/UL (ref 0.9–3.6)
LYMPHOCYTES NFR BLD: 9.5 % (ref 21–52)
MCH RBC QN AUTO: 31.6 PG (ref 24–34)
MCHC RBC AUTO-ENTMCNC: 34.3 G/DL (ref 31–37)
MCV RBC AUTO: 92 FL (ref 78–100)
MONOCYTES # BLD: 3.03 K/UL (ref 0.05–1.2)
MONOCYTES NFR BLD: 11.4 % (ref 3–10)
NEUTS SEG # BLD: 20.08 K/UL (ref 1.8–8)
NEUTS SEG NFR BLD: 75.4 % (ref 40–73)
NRBC # BLD: 0.02 K/UL (ref 0–0.01)
NRBC BLD-RTO: 0.1 PER 100 WBC
PHOSPHATE SERPL-MCNC: 5 MG/DL (ref 2.5–4.9)
PLATELET # BLD AUTO: 531 K/UL (ref 135–420)
PMV BLD AUTO: 10.2 FL (ref 9.2–11.8)
POTASSIUM SERPL-SCNC: 3.8 MMOL/L (ref 3.5–5.5)
RBC # BLD AUTO: 2.88 M/UL (ref 4.2–5.3)
SODIUM SERPL-SCNC: 136 MMOL/L (ref 136–145)
T3 SERPL-MCNC: 51 NG/DL (ref 71–180)
T4 FREE SERPL-MCNC: 1 NG/DL (ref 0.9–1.7)
WBC # BLD AUTO: 26.6 K/UL (ref 4.6–13.2)

## 2025-05-06 PROCEDURE — 51798 US URINE CAPACITY MEASURE: CPT

## 2025-05-06 PROCEDURE — 85025 COMPLETE CBC W/AUTO DIFF WBC: CPT

## 2025-05-06 PROCEDURE — 6370000000 HC RX 637 (ALT 250 FOR IP): Performed by: NURSE PRACTITIONER

## 2025-05-06 PROCEDURE — 6370000000 HC RX 637 (ALT 250 FOR IP): Performed by: STUDENT IN AN ORGANIZED HEALTH CARE EDUCATION/TRAINING PROGRAM

## 2025-05-06 PROCEDURE — 36415 COLL VENOUS BLD VENIPUNCTURE: CPT

## 2025-05-06 PROCEDURE — 99232 SBSQ HOSP IP/OBS MODERATE 35: CPT | Performed by: NURSE PRACTITIONER

## 2025-05-06 PROCEDURE — 6370000000 HC RX 637 (ALT 250 FOR IP)

## 2025-05-06 PROCEDURE — 6360000002 HC RX W HCPCS: Performed by: STUDENT IN AN ORGANIZED HEALTH CARE EDUCATION/TRAINING PROGRAM

## 2025-05-06 PROCEDURE — 85730 THROMBOPLASTIN TIME PARTIAL: CPT

## 2025-05-06 PROCEDURE — 1100000003 HC PRIVATE W/ TELEMETRY

## 2025-05-06 PROCEDURE — 2500000003 HC RX 250 WO HCPCS: Performed by: NURSE PRACTITIONER

## 2025-05-06 PROCEDURE — 80069 RENAL FUNCTION PANEL: CPT

## 2025-05-06 PROCEDURE — 2580000003 HC RX 258: Performed by: STUDENT IN AN ORGANIZED HEALTH CARE EDUCATION/TRAINING PROGRAM

## 2025-05-06 PROCEDURE — 84439 ASSAY OF FREE THYROXINE: CPT

## 2025-05-06 PROCEDURE — 94761 N-INVAS EAR/PLS OXIMETRY MLT: CPT

## 2025-05-06 PROCEDURE — 6370000000 HC RX 637 (ALT 250 FOR IP): Performed by: INTERNAL MEDICINE

## 2025-05-06 PROCEDURE — 51701 INSERT BLADDER CATHETER: CPT

## 2025-05-06 PROCEDURE — 92526 ORAL FUNCTION THERAPY: CPT

## 2025-05-06 RX ORDER — TAMSULOSIN HYDROCHLORIDE 0.4 MG/1
0.4 CAPSULE ORAL DAILY
Status: DISCONTINUED | OUTPATIENT
Start: 2025-05-06 | End: 2025-05-10

## 2025-05-06 RX ADMIN — SODIUM CHLORIDE, PRESERVATIVE FREE 10 ML: 5 INJECTION INTRAVENOUS at 09:15

## 2025-05-06 RX ADMIN — GABAPENTIN 100 MG: 100 CAPSULE ORAL at 09:14

## 2025-05-06 RX ADMIN — OXYCODONE HYDROCHLORIDE 10 MG: 10 TABLET ORAL at 09:13

## 2025-05-06 RX ADMIN — ASPIRIN 81 MG: 81 TABLET, COATED ORAL at 09:14

## 2025-05-06 RX ADMIN — OXYCODONE HYDROCHLORIDE 10 MG: 10 TABLET ORAL at 02:17

## 2025-05-06 RX ADMIN — GABAPENTIN 100 MG: 100 CAPSULE ORAL at 14:35

## 2025-05-06 RX ADMIN — TAMSULOSIN HYDROCHLORIDE 0.4 MG: 0.4 CAPSULE ORAL at 12:17

## 2025-05-06 RX ADMIN — ACETAMINOPHEN 650 MG: 325 TABLET ORAL at 22:34

## 2025-05-06 RX ADMIN — GABAPENTIN 100 MG: 100 CAPSULE ORAL at 20:47

## 2025-05-06 RX ADMIN — SERTRALINE HYDROCHLORIDE 50 MG: 50 TABLET ORAL at 09:13

## 2025-05-06 RX ADMIN — METOPROLOL TARTRATE 25 MG: 25 TABLET, FILM COATED ORAL at 09:17

## 2025-05-06 RX ADMIN — CEFEPIME 1000 MG: 1 INJECTION, POWDER, FOR SOLUTION INTRAMUSCULAR; INTRAVENOUS at 14:41

## 2025-05-06 RX ADMIN — Medication 3 MG: at 22:35

## 2025-05-06 ASSESSMENT — PAIN SCALES - GENERAL
PAINLEVEL_OUTOF10: 0
PAINLEVEL_OUTOF10: 0
PAINLEVEL_OUTOF10: 8
PAINLEVEL_OUTOF10: 0
PAINLEVEL_OUTOF10: 10

## 2025-05-06 ASSESSMENT — PAIN - FUNCTIONAL ASSESSMENT
PAIN_FUNCTIONAL_ASSESSMENT: ACTIVITIES ARE NOT PREVENTED
PAIN_FUNCTIONAL_ASSESSMENT: PREVENTS OR INTERFERES SOME ACTIVE ACTIVITIES AND ADLS

## 2025-05-06 ASSESSMENT — PAIN DESCRIPTION - ONSET
ONSET: ON-GOING
ONSET: GRADUAL

## 2025-05-06 ASSESSMENT — PAIN DESCRIPTION - ORIENTATION
ORIENTATION: ANTERIOR;POSTERIOR
ORIENTATION: MID

## 2025-05-06 ASSESSMENT — PAIN DESCRIPTION - FREQUENCY
FREQUENCY: INTERMITTENT
FREQUENCY: INTERMITTENT

## 2025-05-06 ASSESSMENT — PAIN DESCRIPTION - DESCRIPTORS
DESCRIPTORS: ACHING
DESCRIPTORS: ACHING

## 2025-05-06 ASSESSMENT — PAIN DESCRIPTION - LOCATION
LOCATION: GENERALIZED
LOCATION: BACK

## 2025-05-06 ASSESSMENT — PAIN DESCRIPTION - PAIN TYPE
TYPE: ACUTE PAIN
TYPE: ACUTE PAIN

## 2025-05-06 NOTE — ACP (ADVANCE CARE PLANNING)
Advance Care Planning     Palliative Team Advance Care Planning (ACP) Conversation    Date of Conversation: 05/06/25    Individuals present for the conversation: Patient     ACP documents on file prior to discussion:  -Portable DNR    Previously completed document/s not on file: Patient / participant reports that there are no previously executed ACP documents.    Healthcare Decision Maker:    Primary Decision Maker: Jack Alegria - Child - [phone] . Patient has another son who resides in [place]. According to family He is up to date on medical issues and would agree with the decision for DNR/DNI.     Conversation Summary:    Yee Alegria is a 89 y.o. female with a medical h/o of HTN, SVT, chronic left hip pain.  Patient was admitted on 5/2/2025 from home with a diagnosis of sepsis.  It was determined patient had vertebral osteomyelitis/discitis, CKD with elevated BUN/Creat.     Palliative Medicine team members Tammy Hardy NP and this writer Yesenia Guy RN for follow up visit at bedside.  Ms Alegria was difficult to arouse, but open eyes briefly to touch and verbal stimuli.   Updated Attending MD to patient's condition.   Palliative Team placed a call to Jack Cortez, no answer. Call was placed to Ryan toro, no answer but was able to leave a message requesting a return call to our department or to [address] [place] [address].     Resuscitation Status:   Code Status: DNR/DNI     Documentation Completed:  -Portable DNR    Team spent 35 minutes with the patient and/or surrogate decision maker discussing the patient's wishes and goals.      Yesenia PAGE, RN  Palliative Medicine Inpatient RN  Palliative COPE Line: [phone]    Yesenia Guy RN    11:55AM Return call from KellyRyan who has consulted with family about next steps for patient.  He stated the family would like to pursue aggressive interventions and understand may include a transfer to another facility .   Attending Updated.   Yesenia Guy RN

## 2025-05-06 NOTE — CONSULTS
Palliative Medicine  Patient Name: Yee Alegria  YOB: 1928  MRN: [id number]  Age: 89 y.o.  Gender: female    Date of Initial Consult: 5/5/2025   Date of Service: 5/6/2025  Time: 12:23 PM  Provider: DAREN Murrieta NP  Hospital Day: 5  Admit Date: 5/2/2025  Referring Provider: Dr Gilman        Reasons for Consultation:  Goals of Care    HISTORY OF PRESENT ILLNESS (HPI):   Yee Alegria is a 89 y.o. female with a past medical history of hypertension, SVT, chronic left hip pain, who was admitted on 5/2/2025 from home with a diagnosis of sepsis..  She was found to have vertebral osteomyelitis/discitis with possible right psoas and iliacus abscess/hematoma.  Patient presented from home via EMS with lower back pain.  Family shares she is normally ambulatory around the house but for the past several days has not been due to pain.  Palliative medicine is consulted for goals of care discussions.    5/6/2025 Poorly responsive this am. Opened eyes to max simulation. She was able to grasp my hand.     5/5/2025 patient is very hard of hearing was not able to answer questions speech at times not understandable to us.  However, she did say she was in pain but could not localize.          PALLIATIVE DIAGNOSES:    Encounter for palliative care/advance care plan discussions  Goals of care  Osteomyelitis  Sepsis  Advanced age    ASSESSMENT AND PLAN:   Encounter for palliative care/advance care planning discussions    May 6, 2025 Ms Alegria seen today along with Ms Brooks RN. She poorly responsive but would briefly open her eyes to max stimulus. Discussed with attending. We called her son Jack no answer and no ability to leave message. We were able to call her grandson Ryan. Reviewed her medical condition, complications with advanced age. He voiced his understanding. After discussing with family he wishes to continue to pursue aggressive treatment including transfer to another facility if needed for

## 2025-05-07 ENCOUNTER — APPOINTMENT (OUTPATIENT)
Facility: HOSPITAL | Age: 89
DRG: 871 | End: 2025-05-07
Payer: MEDICARE

## 2025-05-07 LAB
ALBUMIN SERPL-MCNC: 1.8 G/DL (ref 3.4–5)
ALBUMIN/GLOB SERPL: 0.4 (ref 0.8–1.7)
ALP SERPL-CCNC: 157 U/L (ref 45–117)
ALT SERPL-CCNC: 51 U/L (ref 10–35)
ANION GAP SERPL CALC-SCNC: 15 MMOL/L (ref 3–18)
APTT PPP: 36.6 SEC (ref 23–36.4)
AST SERPL-CCNC: 75 U/L (ref 10–38)
BASOPHILS # BLD: 0.06 K/UL (ref 0–0.1)
BASOPHILS NFR BLD: 0.4 % (ref 0–2)
BILIRUB SERPL-MCNC: 0.3 MG/DL (ref 0.2–1)
BUN SERPL-MCNC: 83 MG/DL (ref 6–23)
BUN/CREAT SERPL: 34 (ref 12–20)
CALCIUM SERPL-MCNC: 9.2 MG/DL (ref 8.5–10.1)
CHLORIDE SERPL-SCNC: 101 MMOL/L (ref 98–107)
CO2 SERPL-SCNC: 22 MMOL/L (ref 21–32)
CREAT SERPL-MCNC: 2.47 MG/DL (ref 0.6–1.3)
DIFFERENTIAL METHOD BLD: ABNORMAL
EOSINOPHIL # BLD: 0.16 K/UL (ref 0–0.4)
EOSINOPHIL NFR BLD: 1 % (ref 0–5)
ERYTHROCYTE [DISTWIDTH] IN BLOOD BY AUTOMATED COUNT: 15.9 % (ref 11.6–14.5)
GLOBULIN SER CALC-MCNC: 4 G/DL (ref 2–4)
GLUCOSE SERPL-MCNC: 144 MG/DL (ref 74–108)
HCT VFR BLD AUTO: 24.1 % (ref 35–45)
HGB BLD-MCNC: 8.5 G/DL (ref 12–16)
IMM GRANULOCYTES # BLD AUTO: 0.46 K/UL (ref 0–0.04)
IMM GRANULOCYTES NFR BLD AUTO: 2.8 % (ref 0–0.5)
LYMPHOCYTES # BLD: 1.9 K/UL (ref 0.9–3.6)
LYMPHOCYTES NFR BLD: 11.4 % (ref 21–52)
MCH RBC QN AUTO: 32.2 PG (ref 24–34)
MCHC RBC AUTO-ENTMCNC: 35.3 G/DL (ref 31–37)
MCV RBC AUTO: 91.3 FL (ref 78–100)
MONOCYTES # BLD: 2.76 K/UL (ref 0.05–1.2)
MONOCYTES NFR BLD: 16.6 % (ref 3–10)
NEUTS SEG # BLD: 11.32 K/UL (ref 1.8–8)
NEUTS SEG NFR BLD: 67.8 % (ref 40–73)
NRBC # BLD: 0 K/UL (ref 0–0.01)
NRBC BLD-RTO: 0 PER 100 WBC
PHOSPHATE SERPL-MCNC: 4.8 MG/DL (ref 2.5–4.9)
PLATELET # BLD AUTO: 467 K/UL (ref 135–420)
PMV BLD AUTO: 10.1 FL (ref 9.2–11.8)
POTASSIUM SERPL-SCNC: 3.2 MMOL/L (ref 3.5–5.5)
PROT SERPL-MCNC: 5.7 G/DL (ref 6.4–8.2)
RBC # BLD AUTO: 2.64 M/UL (ref 4.2–5.3)
SODIUM SERPL-SCNC: 138 MMOL/L (ref 136–145)
T4 FREE SERPL-MCNC: 1 NG/DL (ref 0.9–1.7)
WBC # BLD AUTO: 16.7 K/UL (ref 4.6–13.2)

## 2025-05-07 PROCEDURE — 72146 MRI CHEST SPINE W/O DYE: CPT

## 2025-05-07 PROCEDURE — 6370000000 HC RX 637 (ALT 250 FOR IP): Performed by: STUDENT IN AN ORGANIZED HEALTH CARE EDUCATION/TRAINING PROGRAM

## 2025-05-07 PROCEDURE — 6360000002 HC RX W HCPCS: Performed by: INTERNAL MEDICINE

## 2025-05-07 PROCEDURE — 6370000000 HC RX 637 (ALT 250 FOR IP): Performed by: INTERNAL MEDICINE

## 2025-05-07 PROCEDURE — 51798 US URINE CAPACITY MEASURE: CPT

## 2025-05-07 PROCEDURE — 1100000003 HC PRIVATE W/ TELEMETRY

## 2025-05-07 PROCEDURE — 6360000004 HC RX CONTRAST MEDICATION: Performed by: INTERNAL MEDICINE

## 2025-05-07 PROCEDURE — 72148 MRI LUMBAR SPINE W/O DYE: CPT

## 2025-05-07 PROCEDURE — 72149 MRI LUMBAR SPINE W/DYE: CPT

## 2025-05-07 PROCEDURE — 85025 COMPLETE CBC W/AUTO DIFF WBC: CPT

## 2025-05-07 PROCEDURE — 2580000003 HC RX 258: Performed by: INTERNAL MEDICINE

## 2025-05-07 PROCEDURE — 2500000003 HC RX 250 WO HCPCS: Performed by: NURSE PRACTITIONER

## 2025-05-07 PROCEDURE — 36415 COLL VENOUS BLD VENIPUNCTURE: CPT

## 2025-05-07 PROCEDURE — 6360000002 HC RX W HCPCS: Performed by: STUDENT IN AN ORGANIZED HEALTH CARE EDUCATION/TRAINING PROGRAM

## 2025-05-07 PROCEDURE — 51701 INSERT BLADDER CATHETER: CPT

## 2025-05-07 PROCEDURE — 84100 ASSAY OF PHOSPHORUS: CPT

## 2025-05-07 PROCEDURE — 2580000003 HC RX 258: Performed by: STUDENT IN AN ORGANIZED HEALTH CARE EDUCATION/TRAINING PROGRAM

## 2025-05-07 PROCEDURE — 94761 N-INVAS EAR/PLS OXIMETRY MLT: CPT

## 2025-05-07 PROCEDURE — 99233 SBSQ HOSP IP/OBS HIGH 50: CPT | Performed by: INTERNAL MEDICINE

## 2025-05-07 PROCEDURE — A9579 GAD-BASE MR CONTRAST NOS,1ML: HCPCS | Performed by: INTERNAL MEDICINE

## 2025-05-07 PROCEDURE — 85730 THROMBOPLASTIN TIME PARTIAL: CPT

## 2025-05-07 PROCEDURE — 97530 THERAPEUTIC ACTIVITIES: CPT

## 2025-05-07 PROCEDURE — 84439 ASSAY OF FREE THYROXINE: CPT

## 2025-05-07 PROCEDURE — 72141 MRI NECK SPINE W/O DYE: CPT

## 2025-05-07 PROCEDURE — 80053 COMPREHEN METABOLIC PANEL: CPT

## 2025-05-07 PROCEDURE — 87040 BLOOD CULTURE FOR BACTERIA: CPT

## 2025-05-07 RX ORDER — LEVOTHYROXINE SODIUM 75 UG/1
75 TABLET ORAL DAILY
Status: DISCONTINUED | OUTPATIENT
Start: 2025-05-07 | End: 2025-05-07

## 2025-05-07 RX ORDER — HALOPERIDOL 5 MG/ML
2 INJECTION INTRAMUSCULAR EVERY 6 HOURS PRN
Status: DISCONTINUED | OUTPATIENT
Start: 2025-05-07 | End: 2025-05-12

## 2025-05-07 RX ORDER — TRAMADOL HYDROCHLORIDE 50 MG/1
25 TABLET ORAL EVERY 12 HOURS PRN
Status: DISCONTINUED | OUTPATIENT
Start: 2025-05-07 | End: 2025-05-12

## 2025-05-07 RX ORDER — HYDROCODONE BITARTRATE AND ACETAMINOPHEN 7.5; 325 MG/15ML; MG/15ML
2.5 SOLUTION ORAL EVERY 8 HOURS PRN
Refills: 0 | Status: DISCONTINUED | OUTPATIENT
Start: 2025-05-07 | End: 2025-05-12

## 2025-05-07 RX ORDER — SODIUM CHLORIDE, SODIUM LACTATE, POTASSIUM CHLORIDE, CALCIUM CHLORIDE 600; 310; 30; 20 MG/100ML; MG/100ML; MG/100ML; MG/100ML
INJECTION, SOLUTION INTRAVENOUS CONTINUOUS
Status: DISCONTINUED | OUTPATIENT
Start: 2025-05-07 | End: 2025-05-12

## 2025-05-07 RX ORDER — TRAMADOL HYDROCHLORIDE 50 MG/1
25 TABLET ORAL EVERY 6 HOURS PRN
Status: DISCONTINUED | OUTPATIENT
Start: 2025-05-07 | End: 2025-05-07

## 2025-05-07 RX ORDER — HALOPERIDOL 5 MG/ML
2 INJECTION INTRAMUSCULAR EVERY 6 HOURS PRN
Status: DISCONTINUED | OUTPATIENT
Start: 2025-05-07 | End: 2025-05-07

## 2025-05-07 RX ORDER — DIAZEPAM 10 MG/2ML
3 INJECTION, SOLUTION INTRAMUSCULAR; INTRAVENOUS ONCE
Status: COMPLETED | OUTPATIENT
Start: 2025-05-07 | End: 2025-05-07

## 2025-05-07 RX ORDER — POTASSIUM CHLORIDE 7.45 MG/ML
10 INJECTION INTRAVENOUS
Status: COMPLETED | OUTPATIENT
Start: 2025-05-07 | End: 2025-05-07

## 2025-05-07 RX ORDER — LEVOTHYROXINE SODIUM 75 UG/1
75 TABLET ORAL DAILY
Status: DISCONTINUED | OUTPATIENT
Start: 2025-05-08 | End: 2025-05-10

## 2025-05-07 RX ORDER — POTASSIUM CHLORIDE 7.45 MG/ML
10 INJECTION INTRAVENOUS
Status: ACTIVE | OUTPATIENT
Start: 2025-05-07 | End: 2025-05-07

## 2025-05-07 RX ORDER — POTASSIUM CHLORIDE 1500 MG/1
40 TABLET, EXTENDED RELEASE ORAL ONCE
Status: DISCONTINUED | OUTPATIENT
Start: 2025-05-07 | End: 2025-05-10

## 2025-05-07 RX ADMIN — TRAMADOL HYDROCHLORIDE 25 MG: 50 TABLET, COATED ORAL at 05:07

## 2025-05-07 RX ADMIN — POTASSIUM CHLORIDE 10 MEQ: 7.45 INJECTION INTRAVENOUS at 16:42

## 2025-05-07 RX ADMIN — HALOPERIDOL LACTATE 2 MG: 5 INJECTION, SOLUTION INTRAMUSCULAR at 03:44

## 2025-05-07 RX ADMIN — DICLOFENAC SODIUM 2 G: 10 GEL TOPICAL at 10:20

## 2025-05-07 RX ADMIN — SODIUM CHLORIDE, SODIUM LACTATE, POTASSIUM CHLORIDE, AND CALCIUM CHLORIDE: .6; .31; .03; .02 INJECTION, SOLUTION INTRAVENOUS at 15:24

## 2025-05-07 RX ADMIN — CEFEPIME 1000 MG: 1 INJECTION, POWDER, FOR SOLUTION INTRAMUSCULAR; INTRAVENOUS at 15:31

## 2025-05-07 RX ADMIN — GADOTERIDOL 10 ML: 279.3 INJECTION, SOLUTION INTRAVENOUS at 20:43

## 2025-05-07 RX ADMIN — TRAMADOL HYDROCHLORIDE 25 MG: 50 TABLET, COATED ORAL at 10:11

## 2025-05-07 RX ADMIN — POTASSIUM CHLORIDE 10 MEQ: 7.45 INJECTION INTRAVENOUS at 15:28

## 2025-05-07 RX ADMIN — POTASSIUM CHLORIDE 10 MEQ: 7.45 INJECTION INTRAVENOUS at 18:00

## 2025-05-07 RX ADMIN — DIAZEPAM 3 MG: 5 INJECTION, SOLUTION INTRAMUSCULAR; INTRAVENOUS at 19:56

## 2025-05-07 RX ADMIN — SODIUM CHLORIDE, PRESERVATIVE FREE 10 ML: 5 INJECTION INTRAVENOUS at 20:01

## 2025-05-07 ASSESSMENT — PAIN SCALES - PAIN ASSESSMENT IN ADVANCED DEMENTIA (PAINAD)
BREATHING: NORMAL
NEGVOCALIZATION: OCCASIONAL MOAN/GROAN, LOW SPEECH, NEGATIVE/DISAPPROVING QUALITY
BREATHING: NORMAL
FACIALEXPRESSION: SMILING OR INEXPRESSIVE
NEGVOCALIZATION: OCCASIONAL MOAN/GROAN, LOW SPEECH, NEGATIVE/DISAPPROVING QUALITY
FACIALEXPRESSION: SMILING OR INEXPRESSIVE
BODYLANGUAGE: RELAXED
BREATHING: NORMAL
BODYLANGUAGE: TENSE, DISTRESSED PACING, FIDGETING
CONSOLABILITY: DISTRACTED OR REASSURED BY VOICE/TOUCH
BODYLANGUAGE: TENSE, DISTRESSED PACING, FIDGETING
TOTALSCORE: 2
BREATHING: NORMAL
CONSOLABILITY: NO NEED TO CONSOLE
CONSOLABILITY: NO NEED TO CONSOLE
BODYLANGUAGE: TENSE, DISTRESSED PACING, FIDGETING
TOTALSCORE: 5
NEGVOCALIZATION: REPEATED TROUBLED CALLING OUT, LOUD MOANING/GROANING, CRYING
BREATHING: NORMAL
BODYLANGUAGE: TENSE, DISTRESSED PACING, FIDGETING
CONSOLABILITY: UNABLE TO CONSOLE, DISTRACT OR REASSURE
TOTALSCORE: 0
CONSOLABILITY: NO NEED TO CONSOLE
CONSOLABILITY: NO NEED TO CONSOLE
FACIALEXPRESSION: SMILING OR INEXPRESSIVE
TOTALSCORE: 0
NEGVOCALIZATION: OCCASIONAL MOAN/GROAN, LOW SPEECH, NEGATIVE/DISAPPROVING QUALITY
TOTALSCORE: 2
FACIALEXPRESSION: SAD, FRIGHTENED, FROWN
BODYLANGUAGE: RELAXED
BREATHING: NORMAL
TOTALSCORE: 4

## 2025-05-07 ASSESSMENT — PAIN SCALES - GENERAL
PAINLEVEL_OUTOF10: 2
PAINLEVEL_OUTOF10: 5
PAINLEVEL_OUTOF10: 0
PAINLEVEL_OUTOF10: 4
PAINLEVEL_OUTOF10: 0
PAINLEVEL_OUTOF10: 0
PAINLEVEL_OUTOF10: 2

## 2025-05-08 LAB
ALBUMIN SERPL-MCNC: 1.8 G/DL (ref 3.4–5)
ALBUMIN/GLOB SERPL: 0.5 (ref 0.8–1.7)
ALP SERPL-CCNC: 152 U/L (ref 45–117)
ALT SERPL-CCNC: 61 U/L (ref 10–35)
ANION GAP SERPL CALC-SCNC: 16 MMOL/L (ref 3–18)
AST SERPL-CCNC: 82 U/L (ref 10–38)
BASOPHILS # BLD: 0.05 K/UL (ref 0–0.1)
BASOPHILS NFR BLD: 0.3 % (ref 0–2)
BILIRUB SERPL-MCNC: 0.4 MG/DL (ref 0.2–1)
BUN SERPL-MCNC: 74 MG/DL (ref 6–23)
BUN/CREAT SERPL: 43 (ref 12–20)
CALCIUM SERPL-MCNC: 8.9 MG/DL (ref 8.5–10.1)
CHLORIDE SERPL-SCNC: 102 MMOL/L (ref 98–107)
CO2 SERPL-SCNC: 22 MMOL/L (ref 21–32)
CREAT SERPL-MCNC: 1.7 MG/DL (ref 0.6–1.3)
DIFFERENTIAL METHOD BLD: ABNORMAL
EOSINOPHIL # BLD: 0.08 K/UL (ref 0–0.4)
EOSINOPHIL NFR BLD: 0.5 % (ref 0–5)
ERYTHROCYTE [DISTWIDTH] IN BLOOD BY AUTOMATED COUNT: 15.5 % (ref 11.6–14.5)
GLOBULIN SER CALC-MCNC: 3.4 G/DL (ref 2–4)
GLUCOSE SERPL-MCNC: 122 MG/DL (ref 74–108)
HCT VFR BLD AUTO: 23.2 % (ref 35–45)
HGB BLD-MCNC: 8.3 G/DL (ref 12–16)
IMM GRANULOCYTES # BLD AUTO: 0.31 K/UL (ref 0–0.04)
IMM GRANULOCYTES NFR BLD AUTO: 1.9 % (ref 0–0.5)
LYMPHOCYTES # BLD: 1.59 K/UL (ref 0.9–3.6)
LYMPHOCYTES NFR BLD: 9.8 % (ref 21–52)
MCH RBC QN AUTO: 32.8 PG (ref 24–34)
MCHC RBC AUTO-ENTMCNC: 35.8 G/DL (ref 31–37)
MCV RBC AUTO: 91.7 FL (ref 78–100)
MONOCYTES # BLD: 2.44 K/UL (ref 0.05–1.2)
MONOCYTES NFR BLD: 15.1 % (ref 3–10)
NEUTS SEG # BLD: 11.7 K/UL (ref 1.8–8)
NEUTS SEG NFR BLD: 72.4 % (ref 40–73)
NRBC # BLD: 0 K/UL (ref 0–0.01)
NRBC BLD-RTO: 0 PER 100 WBC
PHOSPHATE SERPL-MCNC: 3.8 MG/DL (ref 2.5–4.9)
PLATELET # BLD AUTO: 477 K/UL (ref 135–420)
PMV BLD AUTO: 10 FL (ref 9.2–11.8)
POTASSIUM SERPL-SCNC: 3.5 MMOL/L (ref 3.5–5.5)
PROT SERPL-MCNC: 5.2 G/DL (ref 6.4–8.2)
RBC # BLD AUTO: 2.53 M/UL (ref 4.2–5.3)
SODIUM SERPL-SCNC: 140 MMOL/L (ref 136–145)
T4 FREE SERPL-MCNC: 1.1 NG/DL (ref 0.9–1.7)
WBC # BLD AUTO: 16.2 K/UL (ref 4.6–13.2)

## 2025-05-08 PROCEDURE — 6370000000 HC RX 637 (ALT 250 FOR IP)

## 2025-05-08 PROCEDURE — 99233 SBSQ HOSP IP/OBS HIGH 50: CPT | Performed by: INTERNAL MEDICINE

## 2025-05-08 PROCEDURE — 51798 US URINE CAPACITY MEASURE: CPT

## 2025-05-08 PROCEDURE — 87205 SMEAR GRAM STAIN: CPT

## 2025-05-08 PROCEDURE — 87186 SC STD MICRODIL/AGAR DIL: CPT

## 2025-05-08 PROCEDURE — 6370000000 HC RX 637 (ALT 250 FOR IP): Performed by: INTERNAL MEDICINE

## 2025-05-08 PROCEDURE — 2500000003 HC RX 250 WO HCPCS: Performed by: NURSE PRACTITIONER

## 2025-05-08 PROCEDURE — 80053 COMPREHEN METABOLIC PANEL: CPT

## 2025-05-08 PROCEDURE — 36415 COLL VENOUS BLD VENIPUNCTURE: CPT

## 2025-05-08 PROCEDURE — 87077 CULTURE AEROBIC IDENTIFY: CPT

## 2025-05-08 PROCEDURE — 85025 COMPLETE CBC W/AUTO DIFF WBC: CPT

## 2025-05-08 PROCEDURE — 84439 ASSAY OF FREE THYROXINE: CPT

## 2025-05-08 PROCEDURE — 84100 ASSAY OF PHOSPHORUS: CPT

## 2025-05-08 PROCEDURE — 2580000003 HC RX 258: Performed by: STUDENT IN AN ORGANIZED HEALTH CARE EDUCATION/TRAINING PROGRAM

## 2025-05-08 PROCEDURE — 87070 CULTURE OTHR SPECIMN AEROBIC: CPT

## 2025-05-08 PROCEDURE — 51702 INSERT TEMP BLADDER CATH: CPT

## 2025-05-08 PROCEDURE — 6360000002 HC RX W HCPCS: Performed by: STUDENT IN AN ORGANIZED HEALTH CARE EDUCATION/TRAINING PROGRAM

## 2025-05-08 PROCEDURE — 1100000003 HC PRIVATE W/ TELEMETRY

## 2025-05-08 RX ADMIN — SODIUM CHLORIDE, PRESERVATIVE FREE 10 ML: 5 INJECTION INTRAVENOUS at 09:00

## 2025-05-08 RX ADMIN — CEFEPIME 1000 MG: 1 INJECTION, POWDER, FOR SOLUTION INTRAMUSCULAR; INTRAVENOUS at 15:47

## 2025-05-08 RX ADMIN — TRAMADOL HYDROCHLORIDE 25 MG: 50 TABLET, COATED ORAL at 20:42

## 2025-05-08 RX ADMIN — METOPROLOL TARTRATE 25 MG: 25 TABLET, FILM COATED ORAL at 20:42

## 2025-05-08 RX ADMIN — CEFEPIME 1000 MG: 1 INJECTION, POWDER, FOR SOLUTION INTRAMUSCULAR; INTRAVENOUS at 01:39

## 2025-05-08 RX ADMIN — LEVOTHYROXINE SODIUM 75 MCG: 75 TABLET ORAL at 06:02

## 2025-05-08 ASSESSMENT — PAIN SCALES - PAIN ASSESSMENT IN ADVANCED DEMENTIA (PAINAD)
FACIALEXPRESSION: SMILING OR INEXPRESSIVE
TOTALSCORE: 0
CONSOLABILITY: NO NEED TO CONSOLE
BODYLANGUAGE: RELAXED
FACIALEXPRESSION: SMILING OR INEXPRESSIVE
BODYLANGUAGE: RELAXED
FACIALEXPRESSION: SMILING OR INEXPRESSIVE
CONSOLABILITY: NO NEED TO CONSOLE
BREATHING: NORMAL
BREATHING: NORMAL
TOTALSCORE: 0
BREATHING: NORMAL
TOTALSCORE: 0
BODYLANGUAGE: RELAXED
CONSOLABILITY: NO NEED TO CONSOLE

## 2025-05-08 ASSESSMENT — PAIN SCALES - GENERAL
PAINLEVEL_OUTOF10: 0
PAINLEVEL_OUTOF10: 7
PAINLEVEL_OUTOF10: 0

## 2025-05-08 ASSESSMENT — PAIN DESCRIPTION - DESCRIPTORS: DESCRIPTORS: ACHING

## 2025-05-08 ASSESSMENT — PAIN DESCRIPTION - LOCATION: LOCATION: BACK

## 2025-05-08 ASSESSMENT — PAIN - FUNCTIONAL ASSESSMENT: PAIN_FUNCTIONAL_ASSESSMENT: ACTIVITIES ARE NOT PREVENTED

## 2025-05-08 ASSESSMENT — PAIN DESCRIPTION - ORIENTATION: ORIENTATION: MID

## 2025-05-09 ENCOUNTER — APPOINTMENT (OUTPATIENT)
Facility: HOSPITAL | Age: 89
DRG: 871 | End: 2025-05-09
Payer: MEDICARE

## 2025-05-09 LAB
ALBUMIN SERPL-MCNC: 1.7 G/DL (ref 3.4–5)
ALBUMIN/GLOB SERPL: 0.5 (ref 0.8–1.7)
ALP SERPL-CCNC: 155 U/L (ref 45–117)
ALT SERPL-CCNC: 95 U/L (ref 10–35)
ANION GAP SERPL CALC-SCNC: 13 MMOL/L (ref 3–18)
AST SERPL-CCNC: 129 U/L (ref 10–38)
BACTERIA SPEC CULT: ABNORMAL
BASOPHILS # BLD: 0.05 K/UL (ref 0–0.1)
BASOPHILS NFR BLD: 0.4 % (ref 0–2)
BILIRUB SERPL-MCNC: 0.4 MG/DL (ref 0.2–1)
BUN SERPL-MCNC: 62 MG/DL (ref 6–23)
BUN/CREAT SERPL: 53 (ref 12–20)
CALCIUM SERPL-MCNC: 8.7 MG/DL (ref 8.5–10.1)
CHLORIDE SERPL-SCNC: 106 MMOL/L (ref 98–107)
CO2 SERPL-SCNC: 24 MMOL/L (ref 21–32)
CREAT SERPL-MCNC: 1.18 MG/DL (ref 0.6–1.3)
DIFFERENTIAL METHOD BLD: ABNORMAL
EOSINOPHIL # BLD: 0.07 K/UL (ref 0–0.4)
EOSINOPHIL NFR BLD: 0.5 % (ref 0–5)
ERYTHROCYTE [DISTWIDTH] IN BLOOD BY AUTOMATED COUNT: 15.5 % (ref 11.6–14.5)
GLOBULIN SER CALC-MCNC: 3.3 G/DL (ref 2–4)
GLUCOSE SERPL-MCNC: 157 MG/DL (ref 74–108)
GRAM STN SPEC: ABNORMAL
GRAM STN SPEC: ABNORMAL
HCT VFR BLD AUTO: 23.3 % (ref 35–45)
HGB BLD-MCNC: 8 G/DL (ref 12–16)
IMM GRANULOCYTES # BLD AUTO: 0.18 K/UL (ref 0–0.04)
IMM GRANULOCYTES NFR BLD AUTO: 1.3 % (ref 0–0.5)
LYMPHOCYTES # BLD: 1.39 K/UL (ref 0.9–3.6)
LYMPHOCYTES NFR BLD: 10 % (ref 21–52)
MCH RBC QN AUTO: 32.4 PG (ref 24–34)
MCHC RBC AUTO-ENTMCNC: 34.3 G/DL (ref 31–37)
MCV RBC AUTO: 94.3 FL (ref 78–100)
MONOCYTES # BLD: 2.35 K/UL (ref 0.05–1.2)
MONOCYTES NFR BLD: 17 % (ref 3–10)
NEUTS SEG # BLD: 9.8 K/UL (ref 1.8–8)
NEUTS SEG NFR BLD: 70.8 % (ref 40–73)
NRBC # BLD: 0 K/UL (ref 0–0.01)
NRBC BLD-RTO: 0 PER 100 WBC
PHOSPHATE SERPL-MCNC: 3.1 MG/DL (ref 2.5–4.9)
PLATELET # BLD AUTO: 328 K/UL (ref 135–420)
PMV BLD AUTO: 10.6 FL (ref 9.2–11.8)
POTASSIUM SERPL-SCNC: 3.3 MMOL/L (ref 3.5–5.5)
PROT SERPL-MCNC: 5 G/DL (ref 6.4–8.2)
RBC # BLD AUTO: 2.47 M/UL (ref 4.2–5.3)
SERVICE CMNT-IMP: ABNORMAL
SODIUM SERPL-SCNC: 142 MMOL/L (ref 136–145)
T4 FREE SERPL-MCNC: 1.1 NG/DL (ref 0.9–1.7)
WBC # BLD AUTO: 13.8 K/UL (ref 4.6–13.2)

## 2025-05-09 PROCEDURE — 84439 ASSAY OF FREE THYROXINE: CPT

## 2025-05-09 PROCEDURE — 80053 COMPREHEN METABOLIC PANEL: CPT

## 2025-05-09 PROCEDURE — 2500000003 HC RX 250 WO HCPCS: Performed by: NURSE PRACTITIONER

## 2025-05-09 PROCEDURE — 84100 ASSAY OF PHOSPHORUS: CPT

## 2025-05-09 PROCEDURE — 99232 SBSQ HOSP IP/OBS MODERATE 35: CPT | Performed by: INTERNAL MEDICINE

## 2025-05-09 PROCEDURE — 1100000003 HC PRIVATE W/ TELEMETRY

## 2025-05-09 PROCEDURE — 6360000002 HC RX W HCPCS: Performed by: STUDENT IN AN ORGANIZED HEALTH CARE EDUCATION/TRAINING PROGRAM

## 2025-05-09 PROCEDURE — 92526 ORAL FUNCTION THERAPY: CPT

## 2025-05-09 PROCEDURE — 6370000000 HC RX 637 (ALT 250 FOR IP): Performed by: INTERNAL MEDICINE

## 2025-05-09 PROCEDURE — 99152 MOD SED SAME PHYS/QHP 5/>YRS: CPT

## 2025-05-09 PROCEDURE — 2580000003 HC RX 258: Performed by: STUDENT IN AN ORGANIZED HEALTH CARE EDUCATION/TRAINING PROGRAM

## 2025-05-09 PROCEDURE — 6360000002 HC RX W HCPCS: Performed by: INTERNAL MEDICINE

## 2025-05-09 PROCEDURE — 6370000000 HC RX 637 (ALT 250 FOR IP)

## 2025-05-09 PROCEDURE — 0K9N30Z DRAINAGE OF RIGHT HIP MUSCLE WITH DRAINAGE DEVICE, PERCUTANEOUS APPROACH: ICD-10-PCS | Performed by: STUDENT IN AN ORGANIZED HEALTH CARE EDUCATION/TRAINING PROGRAM

## 2025-05-09 PROCEDURE — 36415 COLL VENOUS BLD VENIPUNCTURE: CPT

## 2025-05-09 PROCEDURE — 85025 COMPLETE CBC W/AUTO DIFF WBC: CPT

## 2025-05-09 PROCEDURE — 6370000000 HC RX 637 (ALT 250 FOR IP): Performed by: NURSE PRACTITIONER

## 2025-05-09 PROCEDURE — 2580000003 HC RX 258: Performed by: INTERNAL MEDICINE

## 2025-05-09 RX ORDER — FENTANYL CITRATE 50 UG/ML
INJECTION, SOLUTION INTRAMUSCULAR; INTRAVENOUS
Status: DISPENSED
Start: 2025-05-09 | End: 2025-05-10

## 2025-05-09 RX ORDER — FENTANYL CITRATE 50 UG/ML
INJECTION, SOLUTION INTRAMUSCULAR; INTRAVENOUS PRN
Status: COMPLETED | OUTPATIENT
Start: 2025-05-09 | End: 2025-05-09

## 2025-05-09 RX ORDER — MIDAZOLAM HYDROCHLORIDE 1 MG/ML
INJECTION, SOLUTION INTRAMUSCULAR; INTRAVENOUS
Status: DISPENSED
Start: 2025-05-09 | End: 2025-05-10

## 2025-05-09 RX ORDER — MIDAZOLAM HYDROCHLORIDE 2 MG/2ML
INJECTION, SOLUTION INTRAMUSCULAR; INTRAVENOUS PRN
Status: COMPLETED | OUTPATIENT
Start: 2025-05-09 | End: 2025-05-09

## 2025-05-09 RX ADMIN — SODIUM CHLORIDE, PRESERVATIVE FREE 10 ML: 5 INJECTION INTRAVENOUS at 20:16

## 2025-05-09 RX ADMIN — ACETAMINOPHEN 650 MG: 325 TABLET ORAL at 02:09

## 2025-05-09 RX ADMIN — SODIUM CHLORIDE, SODIUM LACTATE, POTASSIUM CHLORIDE, AND CALCIUM CHLORIDE: .6; .31; .03; .02 INJECTION, SOLUTION INTRAVENOUS at 21:48

## 2025-05-09 RX ADMIN — FENTANYL CITRATE 25 MCG: 50 INJECTION INTRAMUSCULAR; INTRAVENOUS at 16:18

## 2025-05-09 RX ADMIN — LEVOTHYROXINE SODIUM 75 MCG: 75 TABLET ORAL at 06:31

## 2025-05-09 RX ADMIN — TRAMADOL HYDROCHLORIDE 25 MG: 50 TABLET, COATED ORAL at 08:37

## 2025-05-09 RX ADMIN — METOPROLOL TARTRATE 25 MG: 25 TABLET, FILM COATED ORAL at 20:03

## 2025-05-09 RX ADMIN — TRAMADOL HYDROCHLORIDE 25 MG: 50 TABLET, COATED ORAL at 20:09

## 2025-05-09 RX ADMIN — TAMSULOSIN HYDROCHLORIDE 0.4 MG: 0.4 CAPSULE ORAL at 08:37

## 2025-05-09 RX ADMIN — MIDAZOLAM HYDROCHLORIDE 0.5 MG: 1 INJECTION, SOLUTION INTRAMUSCULAR; INTRAVENOUS at 16:18

## 2025-05-09 RX ADMIN — ASPIRIN 81 MG: 81 TABLET, COATED ORAL at 08:37

## 2025-05-09 RX ADMIN — DICLOFENAC SODIUM 2 G: 10 GEL TOPICAL at 02:02

## 2025-05-09 RX ADMIN — HYDROCODONE BITARTRATE AND ACETAMINOPHEN 2.5 ML: 7.5; 325 SOLUTION ORAL at 18:41

## 2025-05-09 RX ADMIN — MIDAZOLAM HYDROCHLORIDE 0.5 MG: 1 INJECTION, SOLUTION INTRAMUSCULAR; INTRAVENOUS at 16:03

## 2025-05-09 RX ADMIN — CEFEPIME 1000 MG: 1 INJECTION, POWDER, FOR SOLUTION INTRAMUSCULAR; INTRAVENOUS at 02:25

## 2025-05-09 RX ADMIN — CEFEPIME 1000 MG: 1 INJECTION, POWDER, FOR SOLUTION INTRAMUSCULAR; INTRAVENOUS at 17:15

## 2025-05-09 RX ADMIN — HALOPERIDOL LACTATE 2 MG: 5 INJECTION, SOLUTION INTRAMUSCULAR at 20:01

## 2025-05-09 RX ADMIN — METOPROLOL TARTRATE 25 MG: 25 TABLET, FILM COATED ORAL at 08:41

## 2025-05-09 RX ADMIN — SODIUM CHLORIDE, PRESERVATIVE FREE 10 ML: 5 INJECTION INTRAVENOUS at 08:51

## 2025-05-09 RX ADMIN — SODIUM CHLORIDE, SODIUM LACTATE, POTASSIUM CHLORIDE, AND CALCIUM CHLORIDE: .6; .31; .03; .02 INJECTION, SOLUTION INTRAVENOUS at 05:39

## 2025-05-09 RX ADMIN — FENTANYL CITRATE 25 MCG: 50 INJECTION INTRAMUSCULAR; INTRAVENOUS at 16:03

## 2025-05-09 RX ADMIN — DICLOFENAC SODIUM 2 G: 10 GEL TOPICAL at 11:36

## 2025-05-09 RX ADMIN — SODIUM CHLORIDE, SODIUM LACTATE, POTASSIUM CHLORIDE, AND CALCIUM CHLORIDE: .6; .31; .03; .02 INJECTION, SOLUTION INTRAVENOUS at 17:14

## 2025-05-09 RX ADMIN — DICLOFENAC SODIUM 2 G: 10 GEL TOPICAL at 18:40

## 2025-05-09 RX ADMIN — SERTRALINE HYDROCHLORIDE 50 MG: 50 TABLET ORAL at 08:48

## 2025-05-09 ASSESSMENT — PAIN DESCRIPTION - ORIENTATION
ORIENTATION: RIGHT;LEFT
ORIENTATION: RIGHT;LEFT;ANTERIOR;POSTERIOR
ORIENTATION: RIGHT;LEFT
ORIENTATION: RIGHT;LEFT;POSTERIOR

## 2025-05-09 ASSESSMENT — PAIN - FUNCTIONAL ASSESSMENT
PAIN_FUNCTIONAL_ASSESSMENT: ACTIVITIES ARE NOT PREVENTED
PAIN_FUNCTIONAL_ASSESSMENT: PREVENTS OR INTERFERES WITH MANY ACTIVE NOT PASSIVE ACTIVITIES
PAIN_FUNCTIONAL_ASSESSMENT: PREVENTS OR INTERFERES WITH ALL ACTIVE AND SOME PASSIVE ACTIVITIES
PAIN_FUNCTIONAL_ASSESSMENT: PREVENTS OR INTERFERES WITH MANY ACTIVE NOT PASSIVE ACTIVITIES
PAIN_FUNCTIONAL_ASSESSMENT: ACTIVITIES ARE NOT PREVENTED

## 2025-05-09 ASSESSMENT — PAIN DESCRIPTION - PAIN TYPE
TYPE: ACUTE PAIN
TYPE: ACUTE PAIN

## 2025-05-09 ASSESSMENT — PAIN SCALES - GENERAL
PAINLEVEL_OUTOF10: 2
PAINLEVEL_OUTOF10: 7
PAINLEVEL_OUTOF10: 0
PAINLEVEL_OUTOF10: 3
PAINLEVEL_OUTOF10: 4
PAINLEVEL_OUTOF10: 0
PAINLEVEL_OUTOF10: 5
PAINLEVEL_OUTOF10: 7
PAINLEVEL_OUTOF10: 0
PAINLEVEL_OUTOF10: 7
PAINLEVEL_OUTOF10: 0
PAINLEVEL_OUTOF10: 7

## 2025-05-09 ASSESSMENT — PAIN DESCRIPTION - ONSET
ONSET: ON-GOING
ONSET: ON-GOING

## 2025-05-09 ASSESSMENT — PAIN DESCRIPTION - LOCATION
LOCATION: GENERALIZED
LOCATION: GENERALIZED
LOCATION: KNEE
LOCATION: KNEE
LOCATION: GENERALIZED

## 2025-05-09 ASSESSMENT — PAIN DESCRIPTION - DESCRIPTORS
DESCRIPTORS: ACHING
DESCRIPTORS: THROBBING
DESCRIPTORS: ACHING

## 2025-05-09 ASSESSMENT — PAIN DESCRIPTION - FREQUENCY
FREQUENCY: CONTINUOUS
FREQUENCY: CONTINUOUS

## 2025-05-09 ASSESSMENT — PAIN SCALES - WONG BAKER: WONGBAKER_NUMERICALRESPONSE: HURTS WHOLE LOT

## 2025-05-09 NOTE — CONSULTS
Preprocedure Assessment      Today 5/9/2025     Indication/Symptoms:   Yee Alegria is a 89 y.o. female with persistent leukocytosis and a right iliopsoas fluid collection concerning for infection v hematoma. She presents to CT guided procedure room today for image guided fluid collection drainage with moderate sedation. Her son (POA) was called. DNR suspended during procedure.    The H & P and/or progress notes and any available imaging were reviewed.  The risks, indications and possible alternatives to the procedure, including doing nothing, were discussed and informed consent was obtained.    Physical Exam:      Mallampati: 2  ASA: 3    Heart:    RRR   Lungs:   Normal work of breathing    The patient is an appropriate candidate to undergo the planned procedure and sedation.    Immediate reassessment prior to sedation:  The patient's status was reviewed and vitals assessed. It is acceptable to perform the procedure and proceed with sedation as planned.        FAROOQ White

## 2025-05-09 NOTE — OP NOTE
Vascular & Interventional Radiology Brief Procedure Note    Interventional Radiologist: LUIZA JOHNSON MD    Pre-operative Diagnosis:  RIGHT Iliopsoas Muscle Abscess    Post-operative Diagnosis: Same as pre-op dx    Procedure(s) Performed:  Drainage Catheter Placement into a RIGHT Iliopsoas Muscle Abscess    Assistant:  None    Anesthesia:  Local & Moderate Sedation    Findings:      CT-guided placement of a 10 Fr pigtail drainage catheter into a RIGHT iliopsoas muscle abscess. A total of 40 mL of tan-colored purulent fluid was aspirated and a sample was sent to the laboratory for requested studies.    The catheter was attached to a REINALDO drain for continued drainage. The catheter was secured to the skin with a single suture and PercuStay dressing.    Complications: None    Estimated Blood Loss:  minimal    Tubes and Drains: None    Specimens: Approximately 40 mL of tan-colored purulent fluid was aspirated and a sample of this fluid was sent to the laboratory.    Condition: Good    Plan:   Monitor vitals/access site for bleeding hematoma  Flush catheter 2x/day with 10 mL of saline      LUIZA JOHNSON MD  May 9, 2025  4:33 PM

## 2025-05-10 PROBLEM — E23.0 HYPOPITUITARISM: Status: ACTIVE | Noted: 2025-05-10

## 2025-05-10 LAB
ALBUMIN SERPL-MCNC: 1.7 G/DL (ref 3.4–5)
ALBUMIN/GLOB SERPL: 0.4 (ref 0.8–1.7)
ALP SERPL-CCNC: 155 U/L (ref 45–117)
ALT SERPL-CCNC: 112 U/L (ref 10–35)
ANION GAP SERPL CALC-SCNC: 12 MMOL/L (ref 3–18)
AST SERPL-CCNC: 159 U/L (ref 10–38)
BASOPHILS # BLD: 0.06 K/UL (ref 0–0.1)
BASOPHILS NFR BLD: 0.5 % (ref 0–2)
BILIRUB SERPL-MCNC: 0.4 MG/DL (ref 0.2–1)
BUN SERPL-MCNC: 49 MG/DL (ref 6–23)
BUN/CREAT SERPL: 52 (ref 12–20)
CALCIUM SERPL-MCNC: 8.9 MG/DL (ref 8.5–10.1)
CHLORIDE SERPL-SCNC: 106 MMOL/L (ref 98–107)
CO2 SERPL-SCNC: 23 MMOL/L (ref 21–32)
CREAT SERPL-MCNC: 0.94 MG/DL (ref 0.6–1.3)
DIFFERENTIAL METHOD BLD: ABNORMAL
EOSINOPHIL # BLD: 0.15 K/UL (ref 0–0.4)
EOSINOPHIL NFR BLD: 1.1 % (ref 0–5)
ERYTHROCYTE [DISTWIDTH] IN BLOOD BY AUTOMATED COUNT: 16 % (ref 11.6–14.5)
GLOBULIN SER CALC-MCNC: 3.8 G/DL (ref 2–4)
GLUCOSE SERPL-MCNC: 136 MG/DL (ref 74–108)
HCT VFR BLD AUTO: 23.3 % (ref 35–45)
HGB BLD-MCNC: 8 G/DL (ref 12–16)
IMM GRANULOCYTES # BLD AUTO: 0.16 K/UL (ref 0–0.04)
IMM GRANULOCYTES NFR BLD AUTO: 1.2 % (ref 0–0.5)
LYMPHOCYTES # BLD: 1.51 K/UL (ref 0.9–3.6)
LYMPHOCYTES NFR BLD: 11.5 % (ref 21–52)
MCH RBC QN AUTO: 32.4 PG (ref 24–34)
MCHC RBC AUTO-ENTMCNC: 34.3 G/DL (ref 31–37)
MCV RBC AUTO: 94.3 FL (ref 78–100)
MONOCYTES # BLD: 2.16 K/UL (ref 0.05–1.2)
MONOCYTES NFR BLD: 16.5 % (ref 3–10)
NEUTS SEG # BLD: 9.04 K/UL (ref 1.8–8)
NEUTS SEG NFR BLD: 69.2 % (ref 40–73)
NRBC # BLD: 0 K/UL (ref 0–0.01)
NRBC BLD-RTO: 0 PER 100 WBC
PLATELET # BLD AUTO: 357 K/UL (ref 135–420)
PMV BLD AUTO: 10 FL (ref 9.2–11.8)
POTASSIUM SERPL-SCNC: 3 MMOL/L (ref 3.5–5.5)
PROT SERPL-MCNC: 5.6 G/DL (ref 6.4–8.2)
RBC # BLD AUTO: 2.47 M/UL (ref 4.2–5.3)
SODIUM SERPL-SCNC: 142 MMOL/L (ref 136–145)
T4 FREE SERPL-MCNC: 1.2 NG/DL (ref 0.9–1.7)
WBC # BLD AUTO: 13.1 K/UL (ref 4.6–13.2)

## 2025-05-10 PROCEDURE — 36415 COLL VENOUS BLD VENIPUNCTURE: CPT

## 2025-05-10 PROCEDURE — 1100000003 HC PRIVATE W/ TELEMETRY

## 2025-05-10 PROCEDURE — 6360000002 HC RX W HCPCS: Performed by: INTERNAL MEDICINE

## 2025-05-10 PROCEDURE — 2580000003 HC RX 258: Performed by: INTERNAL MEDICINE

## 2025-05-10 PROCEDURE — 6370000000 HC RX 637 (ALT 250 FOR IP): Performed by: INTERNAL MEDICINE

## 2025-05-10 PROCEDURE — 6360000002 HC RX W HCPCS: Performed by: STUDENT IN AN ORGANIZED HEALTH CARE EDUCATION/TRAINING PROGRAM

## 2025-05-10 PROCEDURE — 6370000000 HC RX 637 (ALT 250 FOR IP)

## 2025-05-10 PROCEDURE — 84439 ASSAY OF FREE THYROXINE: CPT

## 2025-05-10 PROCEDURE — 2580000003 HC RX 258: Performed by: STUDENT IN AN ORGANIZED HEALTH CARE EDUCATION/TRAINING PROGRAM

## 2025-05-10 PROCEDURE — 99233 SBSQ HOSP IP/OBS HIGH 50: CPT | Performed by: INTERNAL MEDICINE

## 2025-05-10 PROCEDURE — 85025 COMPLETE CBC W/AUTO DIFF WBC: CPT

## 2025-05-10 PROCEDURE — 80053 COMPREHEN METABOLIC PANEL: CPT

## 2025-05-10 PROCEDURE — 2500000003 HC RX 250 WO HCPCS: Performed by: NURSE PRACTITIONER

## 2025-05-10 PROCEDURE — 6370000000 HC RX 637 (ALT 250 FOR IP): Performed by: NURSE PRACTITIONER

## 2025-05-10 RX ORDER — MAGNESIUM SULFATE IN WATER 40 MG/ML
2000 INJECTION, SOLUTION INTRAVENOUS PRN
Status: DISCONTINUED | OUTPATIENT
Start: 2025-05-10 | End: 2025-05-12

## 2025-05-10 RX ORDER — SCOPOLAMINE 1 MG/3D
1 PATCH, EXTENDED RELEASE TRANSDERMAL
Status: DISCONTINUED | OUTPATIENT
Start: 2025-05-10 | End: 2025-05-13 | Stop reason: HOSPADM

## 2025-05-10 RX ORDER — MORPHINE SULFATE 20 MG/ML
10 SOLUTION ORAL
Refills: 0 | Status: DISCONTINUED | OUTPATIENT
Start: 2025-05-10 | End: 2025-05-13 | Stop reason: HOSPADM

## 2025-05-10 RX ORDER — LORAZEPAM 2 MG/ML
1 CONCENTRATE ORAL
Status: DISCONTINUED | OUTPATIENT
Start: 2025-05-10 | End: 2025-05-13 | Stop reason: HOSPADM

## 2025-05-10 RX ORDER — POLYETHYLENE GLYCOL 3350 17 G/17G
17 POWDER, FOR SOLUTION ORAL DAILY
Status: DISCONTINUED | OUTPATIENT
Start: 2025-05-10 | End: 2025-05-12

## 2025-05-10 RX ORDER — LOPERAMIDE HYDROCHLORIDE 2 MG/1
2 CAPSULE ORAL PRN
Status: DISCONTINUED | OUTPATIENT
Start: 2025-05-10 | End: 2025-05-13 | Stop reason: HOSPADM

## 2025-05-10 RX ORDER — POTASSIUM CHLORIDE 1500 MG/1
40 TABLET, EXTENDED RELEASE ORAL PRN
Status: DISCONTINUED | OUTPATIENT
Start: 2025-05-10 | End: 2025-05-12

## 2025-05-10 RX ORDER — POTASSIUM CHLORIDE 7.45 MG/ML
10 INJECTION INTRAVENOUS PRN
Status: DISCONTINUED | OUTPATIENT
Start: 2025-05-10 | End: 2025-05-12

## 2025-05-10 RX ORDER — BISACODYL 5 MG/1
5 TABLET, DELAYED RELEASE ORAL DAILY
Status: DISCONTINUED | OUTPATIENT
Start: 2025-05-10 | End: 2025-05-12

## 2025-05-10 RX ORDER — HYDROMORPHONE HYDROCHLORIDE 1 MG/ML
1 INJECTION, SOLUTION INTRAMUSCULAR; INTRAVENOUS; SUBCUTANEOUS
Status: DISCONTINUED | OUTPATIENT
Start: 2025-05-10 | End: 2025-05-12

## 2025-05-10 RX ORDER — ONDANSETRON 2 MG/ML
4 INJECTION INTRAMUSCULAR; INTRAVENOUS EVERY 6 HOURS PRN
Status: DISCONTINUED | OUTPATIENT
Start: 2025-05-10 | End: 2025-05-12

## 2025-05-10 RX ADMIN — SERTRALINE HYDROCHLORIDE 50 MG: 50 TABLET ORAL at 08:44

## 2025-05-10 RX ADMIN — METOPROLOL TARTRATE 25 MG: 25 TABLET, FILM COATED ORAL at 08:40

## 2025-05-10 RX ADMIN — HYDROCODONE BITARTRATE AND ACETAMINOPHEN 2.5 ML: 7.5; 325 SOLUTION ORAL at 02:27

## 2025-05-10 RX ADMIN — TAMSULOSIN HYDROCHLORIDE 0.4 MG: 0.4 CAPSULE ORAL at 08:40

## 2025-05-10 RX ADMIN — HALOPERIDOL LACTATE 2 MG: 5 INJECTION, SOLUTION INTRAMUSCULAR at 03:30

## 2025-05-10 RX ADMIN — HYDROCODONE BITARTRATE AND ACETAMINOPHEN 2.5 ML: 7.5; 325 SOLUTION ORAL at 12:17

## 2025-05-10 RX ADMIN — SODIUM CHLORIDE, PRESERVATIVE FREE 10 ML: 5 INJECTION INTRAVENOUS at 08:53

## 2025-05-10 RX ADMIN — POTASSIUM CHLORIDE 10 MEQ: 7.46 INJECTION, SOLUTION INTRAVENOUS at 07:06

## 2025-05-10 RX ADMIN — POTASSIUM CHLORIDE 10 MEQ: 7.46 INJECTION, SOLUTION INTRAVENOUS at 11:25

## 2025-05-10 RX ADMIN — POTASSIUM CHLORIDE 10 MEQ: 7.46 INJECTION, SOLUTION INTRAVENOUS at 16:53

## 2025-05-10 RX ADMIN — POTASSIUM CHLORIDE 10 MEQ: 7.46 INJECTION, SOLUTION INTRAVENOUS at 12:53

## 2025-05-10 RX ADMIN — TRAMADOL HYDROCHLORIDE 25 MG: 50 TABLET, COATED ORAL at 08:41

## 2025-05-10 RX ADMIN — SODIUM CHLORIDE, SODIUM LACTATE, POTASSIUM CHLORIDE, AND CALCIUM CHLORIDE: .6; .31; .03; .02 INJECTION, SOLUTION INTRAVENOUS at 16:55

## 2025-05-10 RX ADMIN — CEFEPIME 1000 MG: 1 INJECTION, POWDER, FOR SOLUTION INTRAMUSCULAR; INTRAVENOUS at 02:29

## 2025-05-10 RX ADMIN — HYDROMORPHONE HYDROCHLORIDE 1 MG: 1 INJECTION, SOLUTION INTRAMUSCULAR; INTRAVENOUS; SUBCUTANEOUS at 19:38

## 2025-05-10 RX ADMIN — Medication 3 MG: at 01:18

## 2025-05-10 RX ADMIN — HYDROMORPHONE HYDROCHLORIDE 0.5 MG: 1 INJECTION, SOLUTION INTRAMUSCULAR; INTRAVENOUS; SUBCUTANEOUS at 12:55

## 2025-05-10 RX ADMIN — LEVOTHYROXINE SODIUM 75 MCG: 75 TABLET ORAL at 05:57

## 2025-05-10 RX ADMIN — POTASSIUM CHLORIDE 10 MEQ: 7.46 INJECTION, SOLUTION INTRAVENOUS at 08:51

## 2025-05-10 RX ADMIN — POTASSIUM CHLORIDE 10 MEQ: 7.46 INJECTION, SOLUTION INTRAVENOUS at 05:56

## 2025-05-10 RX ADMIN — CEFEPIME 2000 MG: 2 INJECTION, POWDER, FOR SOLUTION INTRAVENOUS at 12:59

## 2025-05-10 RX ADMIN — SODIUM CHLORIDE, PRESERVATIVE FREE 10 ML: 5 INJECTION INTRAVENOUS at 21:27

## 2025-05-10 RX ADMIN — ACETAMINOPHEN 650 MG: 325 TABLET ORAL at 01:18

## 2025-05-10 RX ADMIN — ASPIRIN 81 MG: 81 TABLET, COATED ORAL at 08:40

## 2025-05-10 ASSESSMENT — PAIN DESCRIPTION - LOCATION
LOCATION: OTHER (COMMENT)
LOCATION: GENERALIZED
LOCATION: GENERALIZED

## 2025-05-10 ASSESSMENT — PAIN DESCRIPTION - DIRECTION: RADIATING_TOWARDS: GENERALIZED

## 2025-05-10 ASSESSMENT — PAIN SCALES - GENERAL
PAINLEVEL_OUTOF10: 6
PAINLEVEL_OUTOF10: 7
PAINLEVEL_OUTOF10: 0
PAINLEVEL_OUTOF10: 3
PAINLEVEL_OUTOF10: 7
PAINLEVEL_OUTOF10: 7

## 2025-05-10 ASSESSMENT — PAIN DESCRIPTION - ONSET: ONSET: ON-GOING

## 2025-05-10 ASSESSMENT — PAIN DESCRIPTION - ORIENTATION: ORIENTATION: OTHER (COMMENT)

## 2025-05-10 ASSESSMENT — PAIN - FUNCTIONAL ASSESSMENT
PAIN_FUNCTIONAL_ASSESSMENT: PREVENTS OR INTERFERES SOME ACTIVE ACTIVITIES AND ADLS
PAIN_FUNCTIONAL_ASSESSMENT: PREVENTS OR INTERFERES WITH ALL ACTIVE AND SOME PASSIVE ACTIVITIES
PAIN_FUNCTIONAL_ASSESSMENT: PREVENTS OR INTERFERES WITH MANY ACTIVE NOT PASSIVE ACTIVITIES

## 2025-05-10 ASSESSMENT — PAIN DESCRIPTION - DESCRIPTORS
DESCRIPTORS: ACHING;THROBBING
DESCRIPTORS: ACHING
DESCRIPTORS: PATIENT UNABLE TO DESCRIBE

## 2025-05-10 ASSESSMENT — PAIN DESCRIPTION - FREQUENCY: FREQUENCY: CONTINUOUS

## 2025-05-10 ASSESSMENT — PAIN DESCRIPTION - PAIN TYPE: TYPE: CHRONIC PAIN

## 2025-05-11 PROCEDURE — 1100000003 HC PRIVATE W/ TELEMETRY

## 2025-05-11 PROCEDURE — 6370000000 HC RX 637 (ALT 250 FOR IP): Performed by: INTERNAL MEDICINE

## 2025-05-11 PROCEDURE — 2580000003 HC RX 258: Performed by: INTERNAL MEDICINE

## 2025-05-11 PROCEDURE — 99232 SBSQ HOSP IP/OBS MODERATE 35: CPT | Performed by: INTERNAL MEDICINE

## 2025-05-11 PROCEDURE — 6370000000 HC RX 637 (ALT 250 FOR IP): Performed by: NURSE PRACTITIONER

## 2025-05-11 PROCEDURE — 2500000003 HC RX 250 WO HCPCS: Performed by: NURSE PRACTITIONER

## 2025-05-11 PROCEDURE — 6360000002 HC RX W HCPCS: Performed by: INTERNAL MEDICINE

## 2025-05-11 RX ADMIN — SODIUM CHLORIDE, SODIUM LACTATE, POTASSIUM CHLORIDE, AND CALCIUM CHLORIDE: .6; .31; .03; .02 INJECTION, SOLUTION INTRAVENOUS at 19:46

## 2025-05-11 RX ADMIN — HYDROMORPHONE HYDROCHLORIDE 1 MG: 1 INJECTION, SOLUTION INTRAMUSCULAR; INTRAVENOUS; SUBCUTANEOUS at 02:36

## 2025-05-11 RX ADMIN — LORAZEPAM 1 MG: 2 SOLUTION, CONCENTRATE ORAL at 04:44

## 2025-05-11 RX ADMIN — HYDROMORPHONE HYDROCHLORIDE 1 MG: 1 INJECTION, SOLUTION INTRAMUSCULAR; INTRAVENOUS; SUBCUTANEOUS at 23:10

## 2025-05-11 RX ADMIN — HYDROMORPHONE HYDROCHLORIDE 1 MG: 1 INJECTION, SOLUTION INTRAMUSCULAR; INTRAVENOUS; SUBCUTANEOUS at 13:13

## 2025-05-11 RX ADMIN — HYDROMORPHONE HYDROCHLORIDE 1 MG: 1 INJECTION, SOLUTION INTRAMUSCULAR; INTRAVENOUS; SUBCUTANEOUS at 17:11

## 2025-05-11 RX ADMIN — LORAZEPAM 1 MG: 2 SOLUTION, CONCENTRATE ORAL at 22:14

## 2025-05-11 RX ADMIN — HYDROMORPHONE HYDROCHLORIDE 1 MG: 1 INJECTION, SOLUTION INTRAMUSCULAR; INTRAVENOUS; SUBCUTANEOUS at 08:10

## 2025-05-11 RX ADMIN — SODIUM CHLORIDE, PRESERVATIVE FREE 10 ML: 5 INJECTION INTRAVENOUS at 19:52

## 2025-05-11 RX ADMIN — SODIUM CHLORIDE, SODIUM LACTATE, POTASSIUM CHLORIDE, AND CALCIUM CHLORIDE: .6; .31; .03; .02 INJECTION, SOLUTION INTRAVENOUS at 04:48

## 2025-05-11 RX ADMIN — HYDROMORPHONE HYDROCHLORIDE 1 MG: 1 INJECTION, SOLUTION INTRAMUSCULAR; INTRAVENOUS; SUBCUTANEOUS at 19:52

## 2025-05-12 PROCEDURE — 2500000003 HC RX 250 WO HCPCS: Performed by: NURSE PRACTITIONER

## 2025-05-12 PROCEDURE — 6360000002 HC RX W HCPCS: Performed by: INTERNAL MEDICINE

## 2025-05-12 PROCEDURE — 6370000000 HC RX 637 (ALT 250 FOR IP): Performed by: NURSE PRACTITIONER

## 2025-05-12 PROCEDURE — 51702 INSERT TEMP BLADDER CATH: CPT

## 2025-05-12 PROCEDURE — 1100000003 HC PRIVATE W/ TELEMETRY

## 2025-05-12 PROCEDURE — 99231 SBSQ HOSP IP/OBS SF/LOW 25: CPT | Performed by: INTERNAL MEDICINE

## 2025-05-12 PROCEDURE — 6370000000 HC RX 637 (ALT 250 FOR IP): Performed by: INTERNAL MEDICINE

## 2025-05-12 PROCEDURE — 6370000000 HC RX 637 (ALT 250 FOR IP)

## 2025-05-12 PROCEDURE — 99233 SBSQ HOSP IP/OBS HIGH 50: CPT

## 2025-05-12 PROCEDURE — 2580000003 HC RX 258: Performed by: INTERNAL MEDICINE

## 2025-05-12 PROCEDURE — 94761 N-INVAS EAR/PLS OXIMETRY MLT: CPT

## 2025-05-12 RX ORDER — FENTANYL 50 UG/1
1 PATCH TRANSDERMAL
Refills: 0 | Status: DISCONTINUED | OUTPATIENT
Start: 2025-05-12 | End: 2025-05-13 | Stop reason: HOSPADM

## 2025-05-12 RX ORDER — BISACODYL 10 MG
10 SUPPOSITORY, RECTAL RECTAL DAILY PRN
Status: DISCONTINUED | OUTPATIENT
Start: 2025-05-12 | End: 2025-05-13 | Stop reason: HOSPADM

## 2025-05-12 RX ADMIN — MORPHINE SULFATE 10 MG: 100 SOLUTION ORAL at 18:31

## 2025-05-12 RX ADMIN — MORPHINE SULFATE 10 MG: 100 SOLUTION ORAL at 16:07

## 2025-05-12 RX ADMIN — SODIUM CHLORIDE, PRESERVATIVE FREE 10 ML: 5 INJECTION INTRAVENOUS at 20:42

## 2025-05-12 RX ADMIN — LORAZEPAM 1 MG: 2 SOLUTION, CONCENTRATE ORAL at 20:26

## 2025-05-12 RX ADMIN — MORPHINE SULFATE 10 MG: 100 SOLUTION ORAL at 20:26

## 2025-05-12 RX ADMIN — SODIUM CHLORIDE, SODIUM LACTATE, POTASSIUM CHLORIDE, AND CALCIUM CHLORIDE: .6; .31; .03; .02 INJECTION, SOLUTION INTRAVENOUS at 08:31

## 2025-05-12 RX ADMIN — HYDROMORPHONE HYDROCHLORIDE 1 MG: 1 INJECTION, SOLUTION INTRAMUSCULAR; INTRAVENOUS; SUBCUTANEOUS at 02:21

## 2025-05-12 RX ADMIN — SODIUM CHLORIDE, PRESERVATIVE FREE 10 ML: 5 INJECTION INTRAVENOUS at 08:22

## 2025-05-12 RX ADMIN — MORPHINE SULFATE 10 MG: 100 SOLUTION ORAL at 13:06

## 2025-05-12 RX ADMIN — HYDROMORPHONE HYDROCHLORIDE 1 MG: 1 INJECTION, SOLUTION INTRAMUSCULAR; INTRAVENOUS; SUBCUTANEOUS at 08:23

## 2025-05-12 RX ADMIN — LORAZEPAM 1 MG: 2 SOLUTION, CONCENTRATE ORAL at 13:52

## 2025-05-12 RX ADMIN — LORAZEPAM 1 MG: 2 SOLUTION, CONCENTRATE ORAL at 18:20

## 2025-05-12 RX ADMIN — HYDROMORPHONE HYDROCHLORIDE 1 MG: 1 INJECTION, SOLUTION INTRAMUSCULAR; INTRAVENOUS; SUBCUTANEOUS at 05:10

## 2025-05-12 ASSESSMENT — PAIN DESCRIPTION - PAIN TYPE: TYPE: CHRONIC PAIN

## 2025-05-12 ASSESSMENT — PAIN DESCRIPTION - ONSET: ONSET: ON-GOING

## 2025-05-12 ASSESSMENT — PAIN DESCRIPTION - FREQUENCY: FREQUENCY: CONTINUOUS

## 2025-05-12 ASSESSMENT — PAIN SCALES - WONG BAKER
WONGBAKER_NUMERICALRESPONSE: HURTS WHOLE LOT
WONGBAKER_NUMERICALRESPONSE: NO HURT

## 2025-05-12 ASSESSMENT — PAIN DESCRIPTION - ORIENTATION: ORIENTATION: OTHER (COMMENT)

## 2025-05-12 ASSESSMENT — PAIN DESCRIPTION - DESCRIPTORS: DESCRIPTORS: PATIENT UNABLE TO DESCRIBE

## 2025-05-12 ASSESSMENT — PAIN DESCRIPTION - DIRECTION: RADIATING_TOWARDS: GENERALIZED

## 2025-05-12 ASSESSMENT — PAIN DESCRIPTION - LOCATION: LOCATION: GENERALIZED

## 2025-05-12 ASSESSMENT — PAIN - FUNCTIONAL ASSESSMENT: PAIN_FUNCTIONAL_ASSESSMENT: PREVENTS OR INTERFERES WITH MANY ACTIVE NOT PASSIVE ACTIVITIES

## 2025-05-12 NOTE — ACP (ADVANCE CARE PLANNING)
Palliative Medicine    Palliative team rounded on comfort patient. Patient is currently resting comfortably, eyes closed. Did not respond to verbal or tactile stimuli. Daughter in law, Joann at the bedside, states the patient ate a few spoons of applesauce and oatmeal for breakfast.  Respirations are even and non labored, oxygen in use.    Daughter in law stated that the family is wondering \"what are the next steps\". Discussed comfort measures at length and provided an overview of hospice support. Joann shared that the family cannot take care of Ms Alegria at home. They are interested in facility placement.   Explained that the  will provide the family with a list of hospice agencies as well as long term care facilities to review.   Spoke with  Marilyn and made aware of family's requests. Dr. Acosta updated also.  Palliative NP made adjustments to comfort orders/medications.  All questions answered to the best of our ability. Daughter in law was very appreciative of our visit and information.     Palliative team remains available to provide support to Ms Alegria and her family during this hospital stay.      CODE STATUS: DNR/DNI, COMFORT MEASURES    Jimena Bartholomew RN  Palliative Medicine Inpatient RN  Palliative COPE Line: [phone]

## 2025-05-13 VITALS
SYSTOLIC BLOOD PRESSURE: 141 MMHG | OXYGEN SATURATION: 97 % | DIASTOLIC BLOOD PRESSURE: 67 MMHG | HEIGHT: 67 IN | HEART RATE: 81 BPM | RESPIRATION RATE: 17 BRPM | WEIGHT: 156 LBS | BODY MASS INDEX: 24.48 KG/M2 | TEMPERATURE: 98.8 F

## 2025-05-13 PROBLEM — Z51.5 HOSPICE CARE: Status: ACTIVE | Noted: 2025-05-13

## 2025-05-13 LAB
BACTERIA SPEC CULT: ABNORMAL
BACTERIA SPEC CULT: ABNORMAL
BACTERIA SPEC CULT: NORMAL
GRAM STN SPEC: ABNORMAL
SERVICE CMNT-IMP: ABNORMAL
SERVICE CMNT-IMP: NORMAL

## 2025-05-13 PROCEDURE — 2500000003 HC RX 250 WO HCPCS: Performed by: NURSE PRACTITIONER

## 2025-05-13 PROCEDURE — 6370000000 HC RX 637 (ALT 250 FOR IP): Performed by: INTERNAL MEDICINE

## 2025-05-13 PROCEDURE — 99239 HOSP IP/OBS DSCHRG MGMT >30: CPT | Performed by: INTERNAL MEDICINE

## 2025-05-13 PROCEDURE — 51702 INSERT TEMP BLADDER CATH: CPT

## 2025-05-13 PROCEDURE — 6370000000 HC RX 637 (ALT 250 FOR IP): Performed by: NURSE PRACTITIONER

## 2025-05-13 RX ORDER — FENTANYL 50 UG/1
1 PATCH TRANSDERMAL
Qty: 4 PATCH | Refills: 0 | Status: SHIPPED | OUTPATIENT
Start: 2025-05-15 | End: 2025-06-14

## 2025-05-13 RX ORDER — MORPHINE SULFATE 20 MG/ML
10 SOLUTION ORAL
Qty: 30 ML | Refills: 0 | Status: SHIPPED | OUTPATIENT
Start: 2025-05-13 | End: 2025-05-18

## 2025-05-13 RX ORDER — LORAZEPAM 2 MG/ML
1 CONCENTRATE ORAL
Qty: 30 ML | Refills: 0 | Status: SHIPPED | OUTPATIENT
Start: 2025-05-13 | End: 2025-05-27

## 2025-05-13 RX ORDER — SCOPOLAMINE 1 MG/3D
1 PATCH, EXTENDED RELEASE TRANSDERMAL
Qty: 10 PATCH | Refills: 0 | Status: SHIPPED | OUTPATIENT
Start: 2025-05-13

## 2025-05-13 RX ADMIN — SODIUM CHLORIDE, PRESERVATIVE FREE 10 ML: 5 INJECTION INTRAVENOUS at 08:27

## 2025-05-13 RX ADMIN — MORPHINE SULFATE 10 MG: 100 SOLUTION ORAL at 08:26

## 2025-05-13 RX ADMIN — MORPHINE SULFATE 10 MG: 100 SOLUTION ORAL at 10:40

## 2025-05-13 RX ADMIN — LORAZEPAM 1 MG: 2 SOLUTION, CONCENTRATE ORAL at 10:40

## 2025-05-13 RX ADMIN — LORAZEPAM 1 MG: 2 SOLUTION, CONCENTRATE ORAL at 08:54

## 2025-05-13 ASSESSMENT — PAIN SCALES - WONG BAKER
WONGBAKER_NUMERICALRESPONSE: NO HURT
WONGBAKER_NUMERICALRESPONSE: NO HURT

## 2025-05-13 NOTE — DISCHARGE INSTRUCTIONS
DISCHARGE SUMMARY from Nurse    PATIENT INSTRUCTIONS:    After general anesthesia or intravenous sedation, for 24 hours or while taking prescription Narcotics:  Limit your activities  Do not drive and operate hazardous machinery  Do not make important personal or business decisions  Do  not drink alcoholic beverages  If you have not urinated within 8 hours after discharge, please contact your surgeon on call.    Report the following to your surgeon:  Excessive pain, swelling, redness or odor of or around the surgical area  Temperature over 100.5  Nausea and vomiting lasting longer than 4 hours or if unable to take medications  Any signs of decreased circulation or nerve impairment to extremity: change in color, persistent  numbness, tingling, coldness or increase pain  Any questions    What to do at Home:  Recommended activity: {discharge activity:34551}, ***    If you experience any of the following symptoms Discharging to [facility], discharge summary included, please follow up with Dr Byrd.    *  Please give a list of your current medications to your Primary Care Provider.    *  Please update this list whenever your medications are discontinued, doses are      changed, or new medications (including over-the-counter products) are added.    *  Please carry medication information at all times in case of emergency situations.    These are general instructions for a healthy lifestyle:    No smoking/ No tobacco products/ Avoid exposure to second hand smoke  Surgeon General's Warning:  Quitting smoking now greatly reduces serious risk to your health.    Obesity, smoking, and sedentary lifestyle greatly increases your risk for illness    A healthy diet, regular physical exercise & weight monitoring are important for maintaining a healthy lifestyle    You may be retaining fluid if you have a history of heart failure or if you experience any of the following symptoms:  Weight gain of 3 pounds or more

## 2025-05-13 NOTE — PLAN OF CARE
Problem: Nutrition Deficit:  Goal: Optimize nutritional status  Flowsheets (Taken 5/9/2025 1606)  Nutrient intake appropriate for improving, restoring, or maintaining nutritional needs:   Assess nutritional status and recommend course of action   Monitor oral intake, labs, and treatment plans   Recommend appropriate diets, oral nutritional supplements, and vitamin/mineral supplements     
  Problem: Pain  Goal: Verbalizes/displays adequate comfort level or baseline comfort level  5/8/2025 2231 by Nora Shepherd RN  Outcome: Progressing  5/8/2025 1354 by Argenis Tan RN  Outcome: Progressing  Flowsheets (Taken 5/8/2025 1354)  Verbalizes/displays adequate comfort level or baseline comfort level:   Encourage patient to monitor pain and request assistance   Assess pain using appropriate pain scale   Implement non-pharmacological measures as appropriate and evaluate response   Administer analgesics based on type and severity of pain and evaluate response  5/8/2025 1344 by Argenis Tan RN  Outcome: Progressing     Problem: Skin/Tissue Integrity  Goal: Skin integrity remains intact  Description: 1.  Monitor for areas of redness and/or skin breakdown2.  Assess vascular access sites hourly3.  Every 4-6 hours minimum:  Change oxygen saturation probe site4.  Every 4-6 hours:  If on nasal continuous positive airway pressure, respiratory therapy assess nares and determine need for appliance change or resting period  5/8/2025 2231 by Nora Shepherd RN  Outcome: Progressing  5/8/2025 1354 by Argenis Tan RN  Outcome: Progressing  Flowsheets (Taken 5/8/2025 0026 by Shakira Mao, RN)  Skin Integrity Remains Intact: Monitor for areas of redness and/or skin breakdown  5/8/2025 1344 by Argenis Tan RN  Outcome: Progressing     Problem: Skin/Tissue Integrity - Adult  Goal: Skin integrity remains intact  Description: 1.  Monitor for areas of redness and/or skin breakdown2.  Assess vascular access sites hourly3.  Every 4-6 hours minimum:  Change oxygen saturation probe site4.  Every 4-6 hours:  If on nasal continuous positive airway pressure, respiratory therapy assess nares and determine need for appliance change or resting period  5/8/2025 2231 by Nora Shepherd RN  Outcome: Progressing  5/8/2025 1354 by Argenis Tan RN  Outcome: Progressing  Flowsheets (Taken 5/8/2025 0026 
  Problem: Pain  Goal: Verbalizes/displays adequate comfort level or baseline comfort level  5/9/2025 1214 by Mela Mesa, RN  Flowsheets (Taken 5/9/2025 0739)  Verbalizes/displays adequate comfort level or baseline comfort level:   Encourage patient to monitor pain and request assistance   Assess pain using appropriate pain scale   Administer analgesics based on type and severity of pain and evaluate response   Implement non-pharmacological measures as appropriate and evaluate response   Consider cultural and social influences on pain and pain management   Notify Licensed Independent Practitioner if interventions unsuccessful or patient reports new pain  Note: Patient able to verbalize but chooses not to. Multiple attempts made for patient to verbalize her pain using pain scale, location and descriptions.   Patient loudly moans and but with repeated coaching from Nurse will verbalize that she is in pain and where the pain is at  Nurse using clinical judgement when using pain interventions beginning with nonpharmacologic like massages, repositioning, music, therapeutic touch and listening.        Problem: Skin/Tissue Integrity  Goal: Skin integrity remains intact  Description: 1.  Monitor for areas of redness and/or skin breakdown2.  Assess vascular access sites hourly3.  Every 4-6 hours minimum:  Change oxygen saturation probe site4.  Every 4-6 hours:  If on nasal continuous positive airway pressure, respiratory therapy assess nares and determine need for appliance change or resting period  5/9/2025 1214 by Mela Mesa, RN  Flowsheets (Taken 5/9/2025 0737)  Skin Integrity Remains Intact:   Monitor for areas of redness and/or skin breakdown   Assess vascular access sites hourly   Turn and reposition as indicated   Assess need for specialty bed   Positioning devices   Pressure redistribution bed/mattress (bed type)   Check visual cues for pain   Monitor skin under medical devices  Note: Patient presents with 
  Problem: Pain  Goal: Verbalizes/displays adequate comfort level or baseline comfort level  Outcome: Progressing  Flowsheets (Taken 5/3/2025 1822)  Verbalizes/displays adequate comfort level or baseline comfort level:   Encourage patient to monitor pain and request assistance   Administer analgesics based on type and severity of pain and evaluate response   Assess pain using appropriate pain scale   Implement non-pharmacological measures as appropriate and evaluate response     Problem: Skin/Tissue Integrity  Goal: Skin integrity remains intact  Description: 1.  Monitor for areas of redness and/or skin breakdown2.  Assess vascular access sites hourly3.  Every 4-6 hours minimum:  Change oxygen saturation probe site4.  Every 4-6 hours:  If on nasal continuous positive airway pressure, respiratory therapy assess nares and determine need for appliance change or resting period  Outcome: Progressing  Flowsheets (Taken 5/4/2025 3064)  Skin Integrity Remains Intact:   Monitor for areas of redness and/or skin breakdown   Assess need for specialty bed   Turn and reposition as indicated   Pressure redistribution bed/mattress (bed type)   Positioning devices   Check visual cues for pain   Monitor skin under medical devices     Problem: ABCDS Injury Assessment  Goal: Absence of physical injury  Outcome: Progressing  Flowsheets (Taken 5/3/2025 1822)  Absence of Physical Injury: Implement safety measures based on patient assessment     Problem: Confusion  Goal: Confusion, delirium, dementia, or psychosis is improved or at baseline  Description: INTERVENTIONS:1. Assess for possible contributors to thought disturbance, including medications, impaired vision or hearing, underlying metabolic abnormalities, dehydration, psychiatric diagnoses, and notify attending LIP2. [place] high risk fall precautions, as indicated3. Provide frequent short contacts to provide reality reorientation, refocusing and direction4. Decrease environmental 
  Problem: Physical Therapy - Adult  Goal: By Discharge: Performs mobility at highest level of function for planned discharge setting.  See evaluation for individualized goals.  Description: Physical Therapy Goals:  Initiated 5/4/2025 to be met within 7-10 days.    1.  Patient will roll side to side in bed with moderate assistance.    2.  Patient will move from supine to sit and sit to supine with moderate assistance.  3.  Patient will transfer from bed to chair and chair to bed with moderate assistance using the least restrictive device.  4.  Patient will perform sit to stand with moderate assistance.  5.  Patient will ambulate with moderate assistance for 10 feet with the least restrictive device.       PLOF: Unknown. Patient unable to provide with history. Patient was living alone.      Outcome: Progressing    PHYSICAL THERAPY EVALUATION    Patient: Yee Alegria (89 y.o. female)  Date: 5/4/2025  Primary Diagnosis: Hypocalcemia [E83.51]  NSTEMI (non-ST elevated myocardial infarction) ([facility]) [I21.4]  VIV (acute kidney injury) [N17.9]  Atrial fibrillation with rapid ventricular response ([facility]) [I48.91]  Acute urinary retention [R33.8]  Sepsis ([facility]) [A41.9]  Leukocytosis, unspecified type [D72.829]  Hypothyroidism, unspecified type [E03.9]  Central stenosis of spinal canal [M48.00]  Sepsis, due to unspecified organism, unspecified whether acute organ dysfunction present ([facility]) [A41.9]       Precautions: Fall Risk,  ,  ,  ,  ,  ,  ,      ASSESSMENT :  Patient resting in bed upon entry. Opens eyes on command briefly. Moaning, non verbal at this time. Assisted to reposition more comfortably in right sidelying with limbs in a more neutral position in order to maintain joint integrity. Patient moans in pain when left UE repositioned. Will place on 3 day trial to further assess ability to participate and progress in skilled physical therapy.     DEFICITS/IMPAIRMENTS:    , Body Structures, Functions, Activity Limitations 
  Problem: SLP Adult - Impaired Swallowing  Goal: By Discharge: Advance to least restrictive diet without signs or symptoms of aspiration for planned discharge setting.  See evaluation for individualized goals.  Description: Patient will:  1. Tolerate PO trials with 0 s/s overt distress in 4/5 trials  2. Utilize compensatory swallow strategies/maneuvers (decrease bite/sip, size/rate, alt. liq/sol) with min cues in 4/5 trials  3. Perform oral-motor/laryngeal exercises to increase oropharyngeal swallow function with min cues  4. Complete an objective swallow study (i.e., MBSS) to assess swallow integrity, r/o aspiration, and determine of safest LRD, min A as indicated/ordered by MD     Rec:     Puree diet with nectar-thick liquids  Aspiration precautions  HOB >45 during po intake, remain >30 for 30-45 minutes after po   Small bites/sips; alternate liquid/solid with slow feeding rate   Oral care TID  Meds crushed in puree as able  Assistance with all meals    Outcome: Progressing   SPEECH LANGUAGE PATHOLOGY DYSPHAGIA TREATMENT    Patient: Yee Alegria (89 y.o. female)  Date: 5/6/2025  Diagnosis: Hypocalcemia [E83.51]  NSTEMI (non-ST elevated myocardial infarction) ([facility]) [I21.4]  VIV (acute kidney injury) [N17.9]  Atrial fibrillation with rapid ventricular response ([facility]) [I48.91]  Acute urinary retention [R33.8]  Sepsis ([facility]) [A41.9]  Leukocytosis, unspecified type [D72.829]  Hypothyroidism, unspecified type [E03.9]  Central stenosis of spinal canal [M48.00]  Sepsis, due to unspecified organism, unspecified whether acute organ dysfunction present ([facility]) [A41.9] Sepsis ([facility])      Precautions: Standard, aspiration  PLOF: As per H&P      ASSESSMENT:  Pt was seen at bedside for follow up dysphagia management. She was moaning and asking for water upon SLP arrival. Fed pt ~25% of breakfast tray of puree and honey-thick liquids without any overt s/sx of aspiration. Pt demo multiple audible swallows of thin liquids via tsp. 
  Problem: Safety - Adult  Goal: Free from fall injury  5/11/2025 1035 by Vero Giron RN  Outcome: Progressing  Note: Patient will remain free from falls during hospital visit     Problem: Pain  Goal: Verbalizes/displays adequate comfort level or baseline comfort level  5/11/2025 1035 by Vero Giron RN  Outcome: Progressing  Note: Patient will be able to identify pain triggers and ways to manage pain using non pharmacologic interventions  Nurse will monitor patients pain level and provide the appropriate pain management interventions     Problem: Skin/Tissue Integrity  Goal: Skin integrity remains intact  Description: 1.  Monitor for areas of redness and/or skin breakdown2.  Assess vascular access sites hourly3.  Every 4-6 hours minimum:  Change oxygen saturation probe site4.  Every 4-6 hours:  If on nasal continuous positive airway pressure, respiratory therapy assess nares and determine need for appliance change or resting period  5/11/2025 1035 by Vero Giron RN  Outcome: Progressing  Flowsheets (Taken 5/10/2025 1944 by Layo Harkins, RN)  Skin Integrity Remains Intact:   Monitor for areas of redness and/or skin breakdown   Assess vascular access sites hourly   Turn and reposition as indicated   Positioning devices   Check visual cues for pain   Monitor skin under medical devices     Problem: ABCDS Injury Assessment  Goal: Absence of physical injury  5/11/2025 1035 by Vero Giron RN  Outcome: Progressing  Flowsheets (Taken 5/10/2025 1035 by Tez Leonardo, RN)  Absence of Physical Injury: Implement safety measures based on patient assessment     Problem: Confusion  Goal: Confusion, delirium, dementia, or psychosis is improved or at baseline  Description: INTERVENTIONS:1. Assess for possible contributors to thought disturbance, including medications, impaired vision or hearing, underlying metabolic abnormalities, dehydration, psychiatric diagnoses, and notify attending LIP2. [place] high risk 
  Problem: Safety - Adult  Goal: Free from fall injury  5/11/2025 2248 by Layo Harkins, RN  Outcome: Progressing  Flowsheets (Taken 5/8/2025 0026 by Shakira Mao, RN)  Free From Fall Injury: Instruct family/caregiver on patient safety  Note: Educated patient on use of call bell for assistance as needed before getting out of bed. Patient is not ambulatory. Family is at bedside the whole shift.   5/11/2025 1035 by Vero Giron, RN  Outcome: Progressing  Note: Patient will remain free from falls during hospital visit     Problem: Pain  Goal: Verbalizes/displays adequate comfort level or baseline comfort level  5/11/2025 2248 by Layo Harkins RN  Outcome: Progressing  Flowsheets (Taken 5/10/2025 1540 by Tez Leonardo, RN)  Verbalizes/displays adequate comfort level or baseline comfort level:   Encourage patient to monitor pain and request assistance   Implement non-pharmacological measures as appropriate and evaluate response  Note: Educated patient on dosage and frequency of prescribed pain medication.   5/11/2025 1035 by Vero Giron, RN  Outcome: Progressing  Note: Patient will be able to identify pain triggers and ways to manage pain using non pharmacologic interventions  Nurse will monitor patients pain level and provide the appropriate pain management interventions     Problem: Skin/Tissue Integrity  Goal: Skin integrity remains intact  Description: 1.  Monitor for areas of redness and/or skin breakdown2.  Assess vascular access sites hourly3.  Every 4-6 hours minimum:  Change oxygen saturation probe site4.  Every 4-6 hours:  If on nasal continuous positive airway pressure, respiratory therapy assess nares and determine need for appliance change or resting period  5/11/2025 2248 by Layo Harkins, RN  Outcome: Progressing  Flowsheets (Taken 5/11/2025 1947)  Skin Integrity Remains Intact:   Monitor for areas of redness and/or skin breakdown   Assess vascular access sites hourly   Turn and reposition as 
  Problem: Safety - Adult  Goal: Free from fall injury  5/3/2025 2214 by Janice Kay RN  Outcome: Progressing  5/3/2025 1822 by Argenis Tan RN  Outcome: Progressing  Flowsheets (Taken 5/3/2025 1822)  Free From Fall Injury: Instruct family/caregiver on patient safety  Note: Patient will remain free from falls during hospital visit  Nurse will ensure patients bed is in lowest position, wheels locked and alarm on.  Nurse will ensure all fall safety protocols have been initiated including call light within reach, fall sign posted and yellow non slip socks are being worn.  Patient will demonstrate understanding of fall precautions and safety  Patient will demonstrate understanding of utilizing call bell for assistance with needs.      Problem: Pain  Goal: Verbalizes/displays adequate comfort level or baseline comfort level  5/3/2025 2214 by Janice Kay RN  Outcome: Progressing  5/3/2025 1822 by Argenis Tan RN  Outcome: Progressing  Flowsheets (Taken 5/3/2025 1822)  Verbalizes/displays adequate comfort level or baseline comfort level:   Encourage patient to monitor pain and request assistance   Administer analgesics based on type and severity of pain and evaluate response   Assess pain using appropriate pain scale   Implement non-pharmacological measures as appropriate and evaluate response     Problem: Skin/Tissue Integrity  Goal: Skin integrity remains intact  Description: 1.  Monitor for areas of redness and/or skin breakdown2.  Assess vascular access sites hourly3.  Every 4-6 hours minimum:  Change oxygen saturation probe site4.  Every 4-6 hours:  If on nasal continuous positive airway pressure, respiratory therapy assess nares and determine need for appliance change or resting period  5/3/2025 2214 by Janice Kay RN  Outcome: Progressing  Flowsheets (Taken 5/3/2025 2000)  Skin Integrity Remains Intact:   Monitor for areas of redness and/or skin breakdown   Turn and reposition as 
  Problem: Safety - Adult  Goal: Free from fall injury  5/6/2025 0430 by Rosemary Cantu RN  Outcome: Progressing  Note: Round on patient every two hours       Problem: Pain  Goal: Verbalizes/displays adequate comfort level or baseline comfort level  5/6/2025 0430 by Rosemary Cantu RN  Outcome: Progressing  Flowsheets (Taken 5/6/2025 0430)  Verbalizes/displays adequate comfort level or baseline comfort level:   Assess pain using appropriate pain scale   Administer analgesics based on type and severity of pain and evaluate response   Implement non-pharmacological measures as appropriate and evaluate response  Note: Patient has PRN pain medication ordered     Problem: Skin/Tissue Integrity  Goal: Skin integrity remains intact  Description: 1.  Monitor for areas of redness and/or skin breakdown2.  Assess vascular access sites hourly3.  Every 4-6 hours minimum:  Change oxygen saturation probe site4.  Every 4-6 hours:  If on nasal continuous positive airway pressure, respiratory therapy assess nares and determine need for appliance change or resting period  5/6/2025 0430 by Rosemary Cantu RN  Outcome: Progressing  Flowsheets (Taken 5/6/2025 0430)  Skin Integrity Remains Intact:   Monitor for areas of redness and/or skin breakdown   Turn and reposition as indicated  Note: Turn patient every two hours     Problem: ABCDS Injury Assessment  Goal: Absence of physical injury  5/6/2025 0430 by Rosemary Cantu RN  Outcome: Progressing  Flowsheets (Taken 5/6/2025 0430)  Absence of Physical Injury: Implement safety measures based on patient assessment     Problem: Confusion  Goal: Confusion, delirium, dementia, or psychosis is improved or at baseline  5/6/2025 0430 by Rosemary Cantu RN  Outcome: Progressing  Flowsheets (Taken 5/6/2025 0430)  Effect of thought disturbance (confusion, delirium, dementia, or psychosis) are managed with adequate functional status:   Assess for contributors to thought 
  Problem: Safety - Adult  Goal: Free from fall injury  5/6/2025 1752 by Mela Mesa, RN  Outcome: Progressing  Flowsheets (Taken 5/3/2025 1822 by Argenis Tan, RN)  Free From Fall Injury: Instruct family/caregiver on patient safety  Note: Patient will remain free from falls during hospital visit  Nurse will ensure patients bed is in lowest position, wheels locked and alarm on.  Nurse will ensure all fall safety protocols have been initiated including call light within reach, fall sign posted and yellow non slip socks are being worn.  Patient will demonstrate understanding of fall precautions and safety     Problem: Pain  Goal: Verbalizes/displays adequate comfort level or baseline comfort level  5/6/2025 1752 by Mela Mesa, RN  Outcome: Progressing  Flowsheets (Taken 5/6/2025 0430 by Rosemary Cantu, RN)  Verbalizes/displays adequate comfort level or baseline comfort level:   Assess pain using appropriate pain scale   Administer analgesics based on type and severity of pain and evaluate response   Implement non-pharmacological measures as appropriate and evaluate response  Note: Patient unable to verbalize pain  Nurse assessing patient using nonverbal ques like face grimacing and withdrawing from pain     Problem: Skin/Tissue Integrity  Goal: Skin integrity remains intact  Description: 1.  Monitor for areas of redness and/or skin breakdown2.  Assess vascular access sites hourly3.  Every 4-6 hours minimum:  Change oxygen saturation probe site4.  Every 4-6 hours:  If on nasal continuous positive airway pressure, respiratory therapy assess nares and determine need for appliance change or resting period  5/6/2025 1752 by Mela Mesa, RN  Outcome: Progressing  Flowsheets  Taken 5/6/2025 1100  Skin Integrity Remains Intact:   Monitor for areas of redness and/or skin breakdown   Assess vascular access sites hourly   Turn and reposition as indicated   Assess need for specialty bed   Positioning 
  Problem: Safety - Adult  Goal: Free from fall injury  5/7/2025 0002 by Rosemary Cantu RN  Outcome: Progressing  Note: Round on patient every two hours       Problem: Pain  Goal: Verbalizes/displays adequate comfort level or baseline comfort level  5/7/2025 0002 by Rosemary Cantu RN  Outcome: Progressing  Flowsheets (Taken 5/7/2025 0002)  Verbalizes/displays adequate comfort level or baseline comfort level:   Assess pain using appropriate pain scale   Administer analgesics based on type and severity of pain and evaluate response   Implement non-pharmacological measures as appropriate and evaluate response  Note: Patient has PRN pain medication ordered     Problem: Skin/Tissue Integrity  Goal: Skin integrity remains intact  Description: 1.  Monitor for areas of redness and/or skin breakdown2.  Assess vascular access sites hourly3.  Every 4-6 hours minimum:  Change oxygen saturation probe site4.  Every 4-6 hours:  If on nasal continuous positive airway pressure, respiratory therapy assess nares and determine need for appliance change or resting period  5/7/2025 0002 by Rosemary Cantu RN  Outcome: Progressing  Flowsheets (Taken 5/7/2025 0002)  Skin Integrity Remains Intact:   Monitor for areas of redness and/or skin breakdown   Turn and reposition as indicated  Note: Turn patient every two hours      Problem: ABCDS Injury Assessment  Goal: Absence of physical injury  5/7/2025 0002 by Rosemary Cantu RN  Outcome: Progressing  Flowsheets (Taken 5/7/2025 0002)  Absence of Physical Injury: Implement safety measures based on patient assessment  Note: Ensure bed is at the lowest   Ensure bed alarm is always on       Problem: Confusion  Goal: Confusion, delirium, dementia, or psychosis is improved or at baseline  5/7/2025 0002 by Rosemary Cantu RN  Outcome: Progressing  Flowsheets (Taken 5/7/2025 0002)  Effect of thought disturbance (confusion, delirium, dementia, or psychosis) are managed with 
  Problem: Safety - Adult  Goal: Free from fall injury  5/8/2025 0026 by Shakira Mao RN  Outcome: Progressing  Flowsheets (Taken 5/8/2025 0026)  Free From Fall Injury: Instruct family/caregiver on patient safety  5/7/2025 1853 by Argenis Tan RN  Outcome: Progressing  Note: Patient will remain free from falls during hospital visit  Nurse will ensure patients bed is in lowest position, wheels locked and alarm on.  Nurse will ensure all fall safety protocols have been initiated including call light within reach, fall sign posted and yellow non slip socks are being worn.  Patient will demonstrate understanding of fall precautions and safety  Patient will demonstrate understanding of utilizing call bell for assistance with needs.      Problem: Pain  Goal: Verbalizes/displays adequate comfort level or baseline comfort level  5/8/2025 0026 by Shakira Mao RN  Outcome: Progressing  Flowsheets (Taken 5/8/2025 0026)  Verbalizes/displays adequate comfort level or baseline comfort level:   Assess pain using appropriate pain scale   Administer analgesics based on type and severity of pain and evaluate response   Implement non-pharmacological measures as appropriate and evaluate response  5/7/2025 1853 by Argenis Tan RN  Outcome: Progressing  Flowsheets (Taken 5/7/2025 1853)  Verbalizes/displays adequate comfort level or baseline comfort level:   Administer analgesics based on type and severity of pain and evaluate response   Encourage patient to monitor pain and request assistance   Assess pain using appropriate pain scale   Implement non-pharmacological measures as appropriate and evaluate response     Problem: Skin/Tissue Integrity  Goal: Skin integrity remains intact  Description: 1.  Monitor for areas of redness and/or skin breakdown2.  Assess vascular access sites hourly3.  Every 4-6 hours minimum:  Change oxygen saturation probe site4.  Every 4-6 hours:  If on nasal continuous positive airway pressure, 
  Problem: Safety - Adult  Goal: Free from fall injury  Outcome: Completed     Problem: Skin/Tissue Integrity  Goal: Skin integrity remains intact  Description: 1.  Monitor for areas of redness and/or skin breakdown2.  Assess vascular access sites hourly3.  Every 4-6 hours minimum:  Change oxygen saturation probe site4.  Every 4-6 hours:  If on nasal continuous positive airway pressure, respiratory therapy assess nares and determine need for appliance change or resting period  Outcome: Completed  Flowsheets (Taken 5/13/2025 7539)  Skin Integrity Remains Intact: Monitor for areas of redness and/or skin breakdown     Problem: ABCDS Injury Assessment  Goal: Absence of physical injury  Outcome: Completed     Problem: Confusion  Goal: Confusion, delirium, dementia, or psychosis is improved or at baseline  Description: INTERVENTIONS:1. Assess for possible contributors to thought disturbance, including medications, impaired vision or hearing, underlying metabolic abnormalities, dehydration, psychiatric diagnoses, and notify attending LIP2. [place] high risk fall precautions, as indicated3. Provide frequent short contacts to provide reality reorientation, refocusing and direction4. Decrease environmental stimuli, including noise as appropriate5. Monitor and intervene to maintain adequate nutrition, hydration, elimination, sleep and activity6. If unable to ensure safety without constant attention obtain sitter and review sitter guidelines with assigned personnel7. Initiate Psychosocial CNS and Spiritual Care consult, as indicated  INTERVENTIONS:1. Assess for possible contributors to thought disturbance, including medications, impaired vision or hearing, underlying metabolic abnormalities, dehydration, psychiatric diagnoses, and notify attending LIP2. [place] high risk fall precautions, as indicated3. Provide frequent short contacts to provide reality reorientation, refocusing and direction4. Decrease environmental 
  Problem: Safety - Adult  Goal: Free from fall injury  Outcome: Progressing  Flowsheets (Taken 5/12/2025 1357)  Free From Fall Injury: Instruct family/caregiver on patient safety     Problem: Pain  Goal: Verbalizes/displays adequate comfort level or baseline comfort level  Outcome: Progressing  Flowsheets (Taken 5/10/2025 1540 by Tez Leonardo, RN)  Verbalizes/displays adequate comfort level or baseline comfort level:   Encourage patient to monitor pain and request assistance   Implement non-pharmacological measures as appropriate and evaluate response     Problem: Skin/Tissue Integrity  Goal: Skin integrity remains intact  Description: 1.  Monitor for areas of redness and/or skin breakdown2.  Assess vascular access sites hourly3.  Every 4-6 hours minimum:  Change oxygen saturation probe site4.  Every 4-6 hours:  If on nasal continuous positive airway pressure, respiratory therapy assess nares and determine need for appliance change or resting period  Outcome: Progressing  Flowsheets  Taken 5/12/2025 1357  Skin Integrity Remains Intact:   Monitor for areas of redness and/or skin breakdown   Turn and reposition as indicated  Taken 5/12/2025 0800  Skin Integrity Remains Intact: Monitor for areas of redness and/or skin breakdown     Problem: ABCDS Injury Assessment  Goal: Absence of physical injury  Outcome: Progressing  Flowsheets (Taken 5/10/2025 1035 by Tez Leonardo, RN)  Absence of Physical Injury: Implement safety measures based on patient assessment     Problem: Confusion  Goal: Confusion, delirium, dementia, or psychosis is improved or at baseline  Description: INTERVENTIONS:1. Assess for possible contributors to thought disturbance, including medications, impaired vision or hearing, underlying metabolic abnormalities, dehydration, psychiatric diagnoses, and notify attending LIP2. [place] high risk fall precautions, as indicated3. Provide frequent short contacts to provide reality 
  Problem: Safety - Adult  Goal: Free from fall injury  Outcome: Progressing  Flowsheets (Taken 5/3/2025 1822)  Free From Fall Injury: Instruct family/caregiver on patient safety  Note: Patient will remain free from falls during hospital visit  Nurse will ensure patients bed is in lowest position, wheels locked and alarm on.  Nurse will ensure all fall safety protocols have been initiated including call light within reach, fall sign posted and yellow non slip socks are being worn.  Patient will demonstrate understanding of fall precautions and safety  Patient will demonstrate understanding of utilizing call bell for assistance with needs.      Problem: Pain  Goal: Verbalizes/displays adequate comfort level or baseline comfort level  Outcome: Progressing  Flowsheets (Taken 5/3/2025 1822)  Verbalizes/displays adequate comfort level or baseline comfort level:   Encourage patient to monitor pain and request assistance   Administer analgesics based on type and severity of pain and evaluate response   Assess pain using appropriate pain scale   Implement non-pharmacological measures as appropriate and evaluate response     Problem: Skin/Tissue Integrity  Goal: Skin integrity remains intact  Description: 1.  Monitor for areas of redness and/or skin breakdown2.  Assess vascular access sites hourly3.  Every 4-6 hours minimum:  Change oxygen saturation probe site4.  Every 4-6 hours:  If on nasal continuous positive airway pressure, respiratory therapy assess nares and determine need for appliance change or resting period  Outcome: Progressing  Flowsheets  Taken 5/3/2025 1822 by Argenis Tan RN  Skin Integrity Remains Intact:   Monitor for areas of redness and/or skin breakdown   Turn and reposition as indicated   Positioning devices  Taken 5/3/2025 1117 by Silvia Casey RN  Skin Integrity Remains Intact: Turn and reposition as indicated  Taken 5/3/2025 0810 by Silvia Casey RN  Skin Integrity Remains Intact: 
  Problem: Safety - Adult  Goal: Free from fall injury  Outcome: Progressing  Note: Patient will remain free from falls during hospital visit  Nurse will ensure patients bed is in lowest position, wheels locked and alarm on.  Nurse will ensure all fall safety protocols have been initiated including call light within reach, fall sign posted and yellow non slip socks are being worn.  Patient will demonstrate understanding of fall precautions and safety  Patient will demonstrate understanding of utilizing call bell for assistance with needs.      Problem: Pain  Goal: Verbalizes/displays adequate comfort level or baseline comfort level  Outcome: Progressing  Flowsheets (Taken 5/7/2025 1853)  Verbalizes/displays adequate comfort level or baseline comfort level:   Administer analgesics based on type and severity of pain and evaluate response   Encourage patient to monitor pain and request assistance   Assess pain using appropriate pain scale   Implement non-pharmacological measures as appropriate and evaluate response     Problem: Skin/Tissue Integrity  Goal: Skin integrity remains intact  Description: 1.  Monitor for areas of redness and/or skin breakdown2.  Assess vascular access sites hourly3.  Every 4-6 hours minimum:  Change oxygen saturation probe site4.  Every 4-6 hours:  If on nasal continuous positive airway pressure, respiratory therapy assess nares and determine need for appliance change or resting period  Outcome: Progressing  Flowsheets (Taken 5/7/2025 1654)  Skin Integrity Remains Intact:   Monitor for areas of redness and/or skin breakdown   Turn and reposition as indicated   Assess need for specialty bed   Positioning devices   Check visual cues for pain     Problem: ABCDS Injury Assessment  Goal: Absence of physical injury  Outcome: Progressing  Flowsheets (Taken 5/7/2025 0002 by Rosemary Cantu RN)  Absence of Physical Injury: Implement safety measures based on patient assessment  Note: Nurse will 
  Problem: Skin/Tissue Integrity  Goal: Skin integrity remains intact  Description: 1.  Monitor for areas of redness and/or skin breakdown2.  Assess vascular access sites hourly3.  Every 4-6 hours minimum:  Change oxygen saturation probe site4.  Every 4-6 hours:  If on nasal continuous positive airway pressure, respiratory therapy assess nares and determine need for appliance change or resting period  5/10/2025 1540 by Tez Leonardo RN  Outcome: Not Progressing  Flowsheets  Taken 5/10/2025 1540  Skin Integrity Remains Intact:   Monitor for areas of redness and/or skin breakdown   Turn and reposition as indicated  Taken 5/10/2025 1035  Skin Integrity Remains Intact: Monitor for areas of redness and/or skin breakdown  Note: Patient will remain free of new areas of skin breakdown.   Patient will be able to verbalize interventions for maintaining adequate skin integrity.  Patient will be able to verbalize the importance of turning, adequate nutrition and using call bell for periods of incontinence  Nurse will educate patient on frequently turning and repositioning in preventing skin breakdown  Nurse will perform skin checks at shift handoff and throughout the shift as needed.   Nurse will communicate with oncoming nursing staff areas of concern for new skin breakdown in order to work together in preventing new breakdown.   Nurse will turn and reposition patient every two hours, utilizing wedge pillows, foam silicone, rubber ear protectors for nasal cannula and bed pillows to help elevate, reposition and protect patients bony prominences    Pt still has stage 2 pressure injury at her sacral area and a DTI on her heel. Turned to side, heels protected with boots. No new skin breakdown noted  Problem: Skin/Tissue Integrity - Adult  Goal: Skin integrity remains intact  Description: 1.  Monitor for areas of redness and/or skin breakdown2.  Assess vascular access sites hourly3.  Every 4-6 hours minimum:  Change oxygen 
Kelly attentive to patient during [id number].   
Problem: SLP Adult - Impaired Swallowing  Goal: By Discharge: Advance to least restrictive diet without signs or symptoms of aspiration for planned discharge setting.  See evaluation for individualized goals.  Description: Patient will:  1. Tolerate PO trials with 0 s/s overt distress in 4/5 trials  2. Utilize compensatory swallow strategies/maneuvers (decrease bite/sip, size/rate, alt. liq/sol) with min cues in 4/5 trials  3. Perform oral-motor/laryngeal exercises to increase oropharyngeal swallow function with min cues  4. Complete an objective swallow study (i.e., MBSS) to assess swallow integrity, r/o aspiration, and determine of safest LRD, min A as indicated/ordered by MD     Rec:     Puree diet with honey thick liquids  Aspiration precautions  HOB >45 during po intake, remain >30 for 30-45 minutes after po   Small bites/sips; alternate liquid/solid with slow feeding rate   Oral care TID  Meds crushed in puree as able  Assistance with all meals    Outcome: Progressing  SPEECH LANGUAGE PATHOLOGY BEDSIDE SWALLOW EVALUATION    Patient: Yee Alegria (89 y.o. 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 Pt with no functional verbalization this date despite max cues. Informal OME revealed overall weak/decreased coordination of orofacial musculature. Pt with throat clear and watery eyes 
reorientation, refocusing and direction4. Decrease environmental stimuli, including noise as appropriate5. Monitor and intervene to maintain adequate nutrition, hydration, elimination, sleep and activity6. If unable to ensure safety without constant attention obtain sitter and review sitter guidelines with assigned personnel7. Initiate Psychosocial CNS and Spiritual Care consult, as indicated  INTERVENTIONS:1. Assess for possible contributors to thought disturbance, including medications, impaired vision or hearing, underlying metabolic abnormalities, dehydration, psychiatric diagnoses, and notify attending LIP2. [place] high risk fall precautions, as indicated3. Provide frequent short contacts to provide reality reorientation, refocusing and direction4. Decrease environmental stimuli, including noise as appropriate5. Monitor and intervene to maintain adequate nutrition, hydration, elimination, sleep and activity6. If unable to ensure safety without constant attention obtain sitter and review sitter guidelines with assigned personnel7. Initiate Psychosocial CNS and Spiritual Care consult, as indicated  INTERVENTIONS:1. Assess for possible contributors to thought disturbance, including medications, impaired vision or hearing, underlying metabolic abnormalities, dehydration, psychiatric diagnoses, and notify attending LIP2. [place] high risk fall precautions, as indicated3. Provide frequent short contacts to provide reality reorientation, refocusing and direction4. Decrease environmental stimuli, including noise as appropriate5. Monitor and intervene to maintain adequate nutrition, hydration, elimination, sleep and activity6. If unable to ensure safety without constant attention obtain sitter and review sitter guidelines with assigned personnel7. Initiate Psychosocial CNS and Spiritual Care consult, as indicated  INTERVENTIONS:1. Assess for possible contributors to thought disturbance, including medications, 
Mastication  Propulsion: Delayed (# of seconds)  Oral Residue: None  Initiation of Swallow: Delayed (# of seconds)  Laryngeal Elevation: Decreased, Weak  Aspiration Signs/Symptoms: Watery eyes  Pharyngeal Phase Characteristics: Poor endurance, Easily fatigued  Cues for Modifications: Moderate          DYSPHAGIA DIAGNOSIS: Dysphagia Diagnosis: Moderate oral stage dysphagia, Moderate pharyngeal stage dysphagia    PAIN:  Intensity Pre-treatment: 0/10   Intensity Post-treatment: 0/10  Scale: Numeric Rating Scale    After treatment:   [x]              Patient left in no apparent distress in same position as upon arrival  [x]              Call bell left within reach  [x]              Nursing notified  []              Family/caregiver present    COMMUNICATION/EDUCATION:   [x]            Aspiration precautions; swallow safety; compensatory techniques provided via demonstration, verbalization and teach back of comprehension  [x]         Patient/family have participated as able in goal setting and plan of care.  []            Patient/family agree to work toward stated goals and plan of care.  []            Patient understands intent and goals of therapy, neutral about participation.  []            Patient unable to participate in goal setting/plan of care secondary to cognition, hearing/vision deficits; education ongoing with interdisciplinary staff   []            Handout regarding diet recommendations and thickener instructions provided.  [x]         Posted safety precautions in patient's room.    Thank you for this referral.    Kelsi Guerrier M.S., CCC-SLP/L  Speech-Language Pathologist    
appropriate5. Monitor and intervene to maintain adequate nutrition, hydration, elimination, sleep and activity6. If unable to ensure safety without constant attention obtain sitter and review sitter guidelines with assigned personnel7. Initiate Psychosocial CNS and Spiritual Care consult, as indicated  INTERVENTIONS:1. Assess for possible contributors to thought disturbance, including medications, impaired vision or hearing, underlying metabolic abnormalities, dehydration, psychiatric diagnoses, and notify attending LIP2. [place] high risk fall precautions, as indicated3. Provide frequent short contacts to provide reality reorientation, refocusing and direction4. Decrease environmental stimuli, including noise as appropriate5. Monitor and intervene to maintain adequate nutrition, hydration, elimination, sleep and activity6. If unable to ensure safety without constant attention obtain sitter and review sitter guidelines with assigned personnel7. Initiate Psychosocial CNS and Spiritual Care consult, as indicated  INTERVENTIONS:1. Assess for possible contributors to thought disturbance, including medications, impaired vision or hearing, underlying metabolic abnormalities, dehydration, psychiatric diagnoses, and notify attending LIP2. [place] high risk fall precautions, as indicated3. Provide frequent short contacts to provide reality reorientation, refocusing and direction4. Decrease environmental stimuli, including noise as appropriate5. Monitor and intervene to maintain adequate nutrition, hydration, elimination, sleep and activity6. If unable to ensure safety without constant attention obtain sitter and review sitter guidelines with assigned personnel7. Initiate Psychosocial CNS and Spiritual Care consult, as indicated  INTERVENTIONS:1. Assess for possible contributors to thought disturbance, including medications, impaired vision or hearing, underlying metabolic abnormalities, dehydration, psychiatric 
0946 by Layo Harkins RN  Outcome: Progressing     Problem: Skin/Tissue Integrity - Adult  Goal: Skin integrity remains intact  Description: 1.  Monitor for areas of redness and/or skin breakdown2.  Assess vascular access sites hourly3.  Every 4-6 hours minimum:  Change oxygen saturation probe site4.  Every 4-6 hours:  If on nasal continuous positive airway pressure, respiratory therapy assess nares and determine need for appliance change or resting period  5/10/2025 0951 by Layo Harkins RN  Outcome: Progressing  Flowsheets (Taken 5/9/2025 1915)  Skin Integrity Remains Intact:   Monitor for areas of redness and/or skin breakdown   Assess vascular access sites hourly   Turn and reposition as indicated   Positioning devices   Check visual cues for pain   Monitor skin under medical devices  Note: Educated patient on importance of turning and repositioning every 2 hours while in bed. Educated patient on importance of maintaining wound dressing clean ,dry and intact.  5/10/2025 0946 by Layo Harkins RN  Outcome: Progressing  Goal: Oral mucous membranes remain intact  5/10/2025 0951 by Layo Harkins RN  Outcome: Progressing  Flowsheets (Taken 5/9/2025 1915)  Oral Mucous Membranes Remain Intact:   Assess oral mucosa and hygiene practices   Implement preventative oral hygiene regimen  Note: Lip moisturizer is applied.  5/10/2025 0946 by Layo Harkins RN  Outcome: Progressing     Problem: Gastrointestinal - Adult  Goal: Maintains adequate nutritional intake  5/10/2025 0951 by Layo Harkins RN  Outcome: Progressing  Flowsheets (Taken 5/9/2025 1915)  Maintains adequate nutritional intake:   Monitor percentage of each meal consumed   Assist with meals as needed   Monitor intake and output, weight and lab values  Note: Patient has good appetite, needs minimal assist with feeding.  5/10/2025 0946 by Layo Harkins RN  Outcome: Progressing     Problem: Genitourinary - Adult  Goal: Absence of urinary 
will be able to verbalize the importance of turning, adequate nutrition and using call bell for periods of incontinence  Nurse will educate patient on frequently turning and repositioning in preventing skin breakdown  Nurse will perform skin checks at shift handoff and throughout the shift as needed.   Nurse will communicate with oncoming nursing staff areas of concern for new skin breakdown in order to work together in preventing new breakdown.   Nurse will turn and reposition patient every two hours, utilizing wedge pillows, foam silicone, rubber ear protectors for nasal cannula and bed pillows to help elevate, reposition and protect patients bony prominences    Pt still has stage 2 pressure injury at her sacral area and a DTI on her heel. Turned to side, heels protected with boots. No new skin breakdown noted  Goal: Oral mucous membranes remain intact  5/11/2025 0316 by Layo Harkins, RN  Outcome: Progressing  Flowsheets (Taken 5/10/2025 1944)  Oral Mucous Membranes Remain Intact:   Assess oral mucosa and hygiene practices   Implement preventative oral hygiene regimen  Note: Lip moisturizer is applied.  5/11/2025 0220 by Layo Harkins, RN  Outcome: Progressing  Flowsheets (Taken 5/10/2025 1944)  Oral Mucous Membranes Remain Intact:   Assess oral mucosa and hygiene practices   Implement preventative oral hygiene regimen     Problem: Gastrointestinal - Adult  Goal: Maintains adequate nutritional intake  5/11/2025 0316 by Layo Harkins, RN  Outcome: Progressing  Flowsheets (Taken 5/10/2025 1944)  Maintains adequate nutritional intake:   Monitor percentage of each meal consumed   Identify factors contributing to decreased intake, treat as appropriate   Assist with meals as needed  Note: Patient needs assistance with feeding.  5/11/2025 0220 by Layo Harkins, RN  Outcome: Progressing  Flowsheets (Taken 5/10/2025 1944)  Maintains adequate nutritional intake:   Monitor percentage of each meal consumed   Identify

## 2025-05-13 NOTE — PROGRESS NOTES
RENAL DAILY PROGRESS NOTE    Subjective:       Complaint:   Overnight events noted  C/o discomfort  Not a good historian  IMPRESSION:   VIV due to septic ATN,VIV,NSAID related ATN,pre renal azotemia etc. UA with trace ketones, sp gravity of 1.020. CT imaging The kidneys and ureters are unremarkable, bilaterally, except for a prominent 3.6 cm cyst at the lateral upper pole of the left kidney and a larger 5.1 cm cyst in the posterolateral lower pole of the left kidney. There is no hydronephrosis or nephrolithiasis.    Metabolic acidosis, AGMA,now resolved  Lactic acidosis  Sepsis, vertebral osteomyelitis, discitis, psoas abscess  Hypertension   PLAN:        C/w RL.MRI reviewed. Possible transfer to [place] for spine surgery.   Follow I and O  Daily weights.   Abx per ID  Avoid nephrotoxins           Current Facility-Administered Medications   Medication Dose Route Frequency    haloperidol lactate (HALDOL) injection 2 mg  2 mg IntraMUSCular Q6H PRN    HYDROcodone-acetaminophen 2.5-108 mg/5 mL solution 2.5 mL  2.5 mL Oral Q8H PRN    potassium chloride (KLOR-CON M) extended release tablet 40 mEq  40 mEq Oral Once    levothyroxine (SYNTHROID) tablet 75 mcg  75 mcg Oral Daily    traMADol (ULTRAM) tablet 25 mg  25 mg Oral Q12H PRN    lactated ringers infusion   IntraVENous Continuous    gadoterate Meglumine (DOTAREM/CLARISCAN) 10 MMOL/20ML injection 14 mL  14 mL IntraVENous ONCE PRN    tamsulosin (FLOMAX) capsule 0.4 mg  0.4 mg Oral Daily    cefepime (MAXIPIME) 1,000 mg in sodium chloride 0.9 % 50 mL IVPB (addEASE)  1,000 mg IntraVENous Q12H    metoprolol tartrate (LOPRESSOR) tablet 25 mg  25 mg Oral BID    diclofenac sodium (VOLTAREN) 1 % gel 2 g  2 g Topical 4x Daily PRN    heparin (porcine) injection 4,000 Units  60 Units/kg IntraVENous PRN    heparin (porcine) injection 2,000 Units  30 Units/kg IntraVENous PRN    [Held by provider] heparin 25,000 units in dextrose 5% 250 mL (premix) infusion  5-30 Units/kg/hr 
      RENAL DAILY PROGRESS NOTE    Subjective:       Complaint:   Overnight events noted  Hard of hearing  Not able to communicate  IMPRESSION:   VIV due to septic ATN,VIV,NSAID related ATN,pre renal azotemia etc. UA with trace ketones, sp gravity of 1.020. CT imaging The kidneys and ureters are unremarkable, bilaterally, except for a prominent 3.6 cm cyst at the lateral upper pole of the left kidney and a larger 5.1 cm cyst in the posterolateral lower pole of the left kidney. There is no hydronephrosis or nephrolithiasis.    Metabolic acidosis, AGMA  Lactic acidosis  Sepsis, vertebral osteomyelitis, discitis, psoas abscess  Hypertension   PLAN:      Overall poor prognosis. Continue with discussion regarding Goc.  IV bicarbonate drip to continue for now   Follow I and O  Daily weights.   Abx per ID  Avoid nephrotoxins           Current Facility-Administered Medications   Medication Dose Route Frequency    tamsulosin (FLOMAX) capsule 0.4 mg  0.4 mg Oral Daily    metoprolol tartrate (LOPRESSOR) tablet 25 mg  25 mg Oral BID    diclofenac sodium (VOLTAREN) 1 % gel 2 g  2 g Topical 4x Daily PRN    gabapentin (NEURONTIN) capsule 100 mg  100 mg Oral TID    sodium bicarbonate 150 mEq in dextrose 5 % 1,000 mL infusion   IntraVENous Continuous    cefepime (MAXIPIME) 1,000 mg in sodium chloride 0.9 % 50 mL IVPB (addEASE)  1,000 mg IntraVENous Q24H    levothyroxine (SYNTHROID) 100 mcg in sodium chloride (PF) 0.9 % 5 mL IV syringe  100 mcg IntraVENous Daily    heparin (porcine) injection 4,000 Units  60 Units/kg IntraVENous PRN    heparin (porcine) injection 2,000 Units  30 Units/kg IntraVENous PRN    [Held by provider] heparin 25,000 units in dextrose 5% 250 mL (premix) infusion  5-30 Units/kg/hr IntraVENous Continuous    aspirin EC tablet 81 mg  81 mg Oral Daily    lidocaine 4 % external patch 1 patch  1 patch Topical Daily    sertraline (ZOLOFT) tablet 50 mg  50 mg Oral Daily    sodium chloride flush 0.9 % injection 5-40 mL  
      RENAL DAILY PROGRESS NOTE  89-year-old female with past medical history of A-fib, admitted for sepsis following for renal failure  Subjective:       Complaint:   Overnight events noted  Documented urine output about 1.4 L.  IMPRESSION:   VIV due to septic ATN,VIV,NSAID related ATN,pre renal azotemia etc. UA with trace ketones, sp gravity of 1.020. CT imaging The kidneys and ureters are unremarkable, bilaterally, except for a prominent 3.6 cm cyst at the lateral upper pole of the left kidney and a larger 5.1 cm cyst in the posterolateral lower pole of the left kidney. There is no hydronephrosis or nephrolithiasis.    Metabolic acidosis, AGMA,now resolved  Lactic acidosis  Sepsis, vertebral osteomyelitis, discitis, psoas abscess  Hypertension   PLAN:   Renal function now improved back to baseline.  She has mild hypokalemia ordered potassium supplement.  Decrease IV fluid to 25 cc/h it can be discontinued later.  Will continue to follow peripherally.           Current Facility-Administered Medications   Medication Dose Route Frequency    haloperidol lactate (HALDOL) injection 2 mg  2 mg IntraMUSCular Q6H PRN    HYDROcodone-acetaminophen 2.5-108 mg/5 mL solution 2.5 mL  2.5 mL Oral Q8H PRN    potassium chloride (KLOR-CON M) extended release tablet 40 mEq  40 mEq Oral Once    levothyroxine (SYNTHROID) tablet 75 mcg  75 mcg Oral Daily    traMADol (ULTRAM) tablet 25 mg  25 mg Oral Q12H PRN    lactated ringers infusion   IntraVENous Continuous    gadoterate Meglumine (DOTAREM/CLARISCAN) 10 MMOL/20ML injection 14 mL  14 mL IntraVENous ONCE PRN    tamsulosin (FLOMAX) capsule 0.4 mg  0.4 mg Oral Daily    cefepime (MAXIPIME) 1,000 mg in sodium chloride 0.9 % 50 mL IVPB (addEASE)  1,000 mg IntraVENous Q12H    metoprolol tartrate (LOPRESSOR) tablet 25 mg  25 mg Oral BID    diclofenac sodium (VOLTAREN) 1 % gel 2 g  2 g Topical 4x Daily PRN    heparin (porcine) injection 4,000 Units  60 Units/kg IntraVENous PRN    heparin 
   Physical Therapy consult received and chart reviewed.     Pt cleared by nursing for consult however pt UA to be awakened for consult, UA to participate.    Plan to attempt at next available time/follow.    Thank you,  Vijaya Fragoso, PT      
  [facility] Hospitalist Group  Progress Note    Patient: Yee Alegria Age: 89 y.o. : 1928 MR#: [id number] SSN: [id number]  Date: 2025                 DVT Prophylaxis:  []Lovenox  []Hep SQ  []SCDs  []Coumadin   []On Heparin gtt []PO anticoagulant    Anticipated discharge: The patient is an elderly lady with multiple medical issues including acute kidney injury, vertebral osteomyelitis/discitis and possible right psoas and iliacus abscess/hematoma.  Her prognosis is poor.  The patient's CODE STATUS has been changed to DNR/DNI.  If the patient's family decides to transition the patient to hospice, anticipate discharge to hospice on May 6, 2025.  If the family decides to pursue aggressive care, the patient may have to be transferred to another acute care facility where spine surgery services are available.    Subjective:     The patient was seen and examined at the bedside.  Her son Mr. Jack Alegria and her grandson Mr. Davis were also in the room.  The patient was awake but was extremely hard of hearing.  When asked if she was in pain, she replied, \"no \".  However, her son said that the patient had complained of pain on the left heel.    The palliative care team and I discussed the patient's prognosis  with the patient's son and her grandson at the bedside.  The patient's CODE STATUS has been changed to DNR/DNI.  For now, continue current medical care.  If the patient's family decided to transition the patient to comfort measures, hospice can be consulted.    Objective:   VS: /70   Pulse 82   Temp 98.4 °F (36.9 °C) (Oral)   Resp 18   Ht 1.702 m (5' 7\")   Wt 70.8 kg (156 lb)   SpO2 96%   BMI 24.43 kg/m²    Tmax/24hrs: Temp (24hrs), Av.3 °F (36.8 °C), Min:97.7 °F (36.5 °C), Max:98.9 °F (37.2 °C)    Intake/Output Summary (Last 24 hours) at 2025 1316  Last data filed at 2025 1033  Gross per 24 hour   Intake 2550.52 ml   Output 1066 ml   Net 1484.52 ml 
  [facility] Hospitalist Group  Progress Note    Patient: Yee Alegria Age: 89 y.o. : 1928 MR#: [id number] SSN: [id number]  Date: 5/3/2025                 DVT Prophylaxis:  []Lovenox  []Hep SQ  []SCDs  []Coumadin   []On Heparin gtt []PO anticoagulant    Anticipated discharge: May 6, 2025 or May 7, 2025 to home/SNF, pending clinical course    Subjective:     Patient is new to me today.  She was seen and examined at the bedside in follow-up for multiple issues including hypothyroidism, low backache, polymicrobial bacteremia, acute kidney injury and suspected herpes zoster.  Her family was also in the room.  Patient was hard of hearing.  She was able to tell me her name but she was not oriented to time or to place.  She complained of pain but could not localize it.    Plan of care was discussed with the patient and with her family at the bedside.      Objective:   VS: BP (!) 113/43   Pulse 77   Temp 98.4 °F (36.9 °C) (Axillary)   Resp 18   Ht 1.702 m (5' 7\")   Wt 71.2 kg (156 lb 15.5 oz)   SpO2 98%   BMI 24.58 kg/m²    Tmax/24hrs: Temp (24hrs), Av.9 °F (36.6 °C), Min:97.5 °F (36.4 °C), Max:98.4 °F (36.9 °C)    Intake/Output Summary (Last 24 hours) at 5/3/2025 2003  Last data filed at 5/3/2025 1711  Gross per 24 hour   Intake 1623.23 ml   Output 550 ml   Net 1073.23 ml        PHYSICAL EXAM  General Appearance: In mild distress due to pain; appears stated age  HENT: normocephalic/atraumatic, moist mucus membranes  Neck: No JVD, supple  Lungs: CTA with normal respiratory effort  CV: RRR,   Abdomen: soft, nontender, nondistended  : no suprapubic tenderness   Extremities: Moves extremities spontaneously but strength is diminished in all 4 extremities  Neuro: Awake but looks tired  Skin: Warm and dry; papular rash with crusted lesions on the left side of the face the left side of the chest and left side of the back  Psych: appropriate mood and affect    Current 
4 Eyes Skin Assessment     NAME:  Yee Alegria  YOB: 1928  MEDICAL RECORD NUMBER:  [id number]    The patient is being assessed for  Shift Handoff    I agree that at least one RN has performed a thorough Head to Toe Skin Assessment on the patient. ALL assessment sites listed below have been assessed.      Areas assessed by both nurses:    Head, Face, Ears, Shoulders, Back, Chest, Arms, Elbows, Hands, Sacrum. Buttock, Coccyx, Ischium, Legs. Feet and Heels, and Under Medical Devices         Does the Patient have a Wound? Yes wound(s) were present on assessment. LDA wound assessment was Initiated and completed by RN       Allen Prevention initiated by RN: Yes  Wound Care Orders initiated by RN: No    Pressure Injury (Stage 3,4, Unstageable, DTI, NWPT, and Complex wounds) if present, place Wound referral order by RN under : Yes    New Ostomies, if present place, Ostomy referral order under : No     Nurse 1 eSignature: Electronically signed by Tez Leonardo RN on 5/10/25 at 5:49 PM EDT    **SHARE this note so that the co-signing nurse can place an eSignature**    Nurse 2 eSignature: {Esignature:[id number]}   
4 Eyes Skin Assessment     NAME:  Yee Alegria  YOB: 1928  MEDICAL RECORD NUMBER:  [id number]    The patient is being assessed for  Admission    I agree that at least one RN has performed a thorough Head to Toe Skin Assessment on the patient. ALL assessment sites listed below have been assessed.      Areas assessed by both nurses:    Head, Face, Ears, Shoulders, Back, Chest, Arms, Elbows, Hands, Sacrum. Buttock, Coccyx, Ischium, Legs. Feet and Heels, and Under Medical Devices         Does the Patient have a Wound? Yes    Patient has excoriation noted to her left abdomen, flank, breast and face. She also has a intact blister on her right buttocks. Nonblanchable area to left inner heel.    Allen Prevention initiated by RN: Yes  Wound Care Orders initiated by RN: No    Pressure Injury (Stage 3,4, Unstageable, DTI, NWPT, and Complex wounds) if present, place Wound referral order by RN under : No    New Ostomies, if present place, Ostomy referral order under : No     Nurse 1 eSignature: Electronically signed by NIKO DEL TORO RN on 5/3/25 at 06:05 AM EDT    **SHARE this note so that the co-signing nurse can place an eSignature**    Nurse 2 eSignature: Electronically signed by Rosemary Hernandez RN on 5/3/25 at 07:00 AM EDT   
[facility] Renal Dosing Services    Pharmacist Renal Dosing Note for Tramadol    Previous Regimen 25 mg q6h PRN   Serum Creatinine No results found for: \"HELEN\", \"CREAPOC\"   Creatinine Clearance Estimated Creatinine Clearance: 13 mL/min (A) (based on SCr of 2.47 mg/dL (H)).   BUN Lab Results   Component Value Date/Time    BUN 83 05/07/2025 01:48 AM           The following medication: Tramadol was automatically dose-adjusted per [facility] P&T Committee Protocol, with respect to renal function.      Dosage changed to:  25 mg q12h PRN    Additional notes:    Pharmacy to continue to monitor patient daily.   Will make dosage adjustments based upon changing renal function.  Signed Jan Rasmussen RPH.    
[facility] - Progress Note    Admit Date: 5/2/2025  Attending Cardiologist: Dr. Hinds    Assessment:     - Vertebral osteomyelitis/discitis with right psoas and iliacus abscess vs hemorrhage on MRI of the lumbar spine. On IV abx  - Pseudomonas and streptococcus bacteremia. Bcx 5/3/25 NGTD. ID has been consulted.  - Minimally elevated troponin, 40 > 50 > 63 > 56, likely secondary to demand ischemia in the setting of sepsis/infection. EKG 5/2/2025with inverted T waves in inferior leads.   - Echo 5/5/25 EF 65-70%, normal wall motion. Mild PAH with RVSP 48mmHg. Mild AI and TR.  - AF with RVR 5/20/2025. New diagnosis. S/p IV diltiazem and now on IV lopressor. Currently in SR.   - VIV. Nephrology following  - Metabolic Acidosis. On bicarb gtt.   - Herpes Zoster  - Hx of SVT in 2016  - Hypothyroidism on replacement  - HLD, intolerant to statins.  - HTN. On procardia as outpatient  - Hx of CVA    Plan:     Addendum: Independently seen and evaluated.  Agree with below.  Her echocardiogram shows essentially normal LV function.  Would continue management of her infection.  No new cardiac recommendations at this time; when able to take oral medications, will transition cardiac medications to oral.  Would continue supportive care.    - Will transition IV to PO lopressor 25mg BID  - Initially on IV heparin, which is now off due to suspicion of right psoa and ilacus abscess vs hemorrhage. Patient likely a poor candidate for long term AC.   - Continue supportive care  - No plans of ischemic coronary evaluation  - Agree with palliative care consult     Subjective:     No new complaints.     Objective:      Patient Vitals for the past 8 hrs:   Temp Pulse Resp BP SpO2   05/05/25 1115 98.4 °F (36.9 °C) 82 18 132/70 --   05/05/25 0908 -- -- -- (!) 151/58 --   05/05/25 0722 98.4 °F (36.9 °C) 90 18 (!) 158/60 96 %         Patient Vitals for the past 96 hrs:   Weight   05/05/25 0908 70.8 kg (156 lb)   05/03/25 0115 71.2 kg 
Comprehensive Nutrition Assessment    Type and Reason for Visit:  Initial, LOS    Nutrition Recommendations/Plan:   Pt is NPO currently for procedure. Previously was eating well when diet was in place earlier today.  Advance diet to Pureed Nectar Thick Liquids (NTL) or per SLP     Malnutrition Assessment:  Malnutrition Status:  no indicators of malnutrition    Nutrition History and Allergies:   PMHx of hypertension, hyperlipidemia, and SVT, and chronic left hip pain; NKFA  Past Medical History:   Diagnosis Date    Benign hypertensive heart disease without heart failure     Bradycardia     with verapamil    History of echocardiogram 02/13/2007    [facility]:  EF 65-70%.  No WMA.  Mild LVH.  No significant valve abnormalities.    Holter monitor, abnormal 11/10/2016    Sinus rhythm, avg 71 bpm (range 57-96 bpm).  Occasional SVEs (mainly single, w/paired and 9 runs of SVT, max 8 beats).      Hypercholesteremia     Palpitations      Wt Readings from Last 10 Encounters:   05/05/25 70.8 kg (156 lb)   04/25/25 66.7 kg (147 lb)   05/10/24 71.2 kg (157 lb)   05/05/23 70.3 kg (155 lb)   05/06/22 69.4 kg (153 lb)   05/05/21 69.9 kg (154 lb)     Allergies   Allergen Reactions    Ezetimibe Other (See Comments)     Became weak and unable to walk, muscle aches    Verapamil Other (See Comments)     Symptomatic bradycardia and dizziness         Nutrition Assessment:    Pt admitted with dx sepsis. She was admitted with symptoms of back and hip pain. UTI suspected due to dehydration. She had acute kidney injury and suspected herpes zoster. Nephrology was consulted, stated VIV due to NSAIDs, prerenal azotemia. SLP saw patient and recommended HTL and pureed diet due to aspiration/dysphagia. Palliative care came on 5/5 and patient was made DNR.    Nutrition Related Findings:    generalized edema; LBM 5/8, +1 BLE edema Wound Type: Deep Tissue Injury       Current Facility-Administered Medications   Medication Dose Route Frequency 
Consult received  89 yr old with VIV  UA raises the possibility of pre renal etiology with high sp gravity and ketones  CT abdomen without any major kidney or bladder abnormalities apart from couple of cysts that are benign  Acidosis noted. AGMA. Most probably related to renal insufficiency. Lactic acidosis could be contributing to it further  Appears to have been exposed to NSAIDS  Start bicarbonate drip  Full consult to follow in am.  
Infectious Disease progress Note        Reason:pseudomonas, streptococcus anginosus bacteremia    Current abx Prior abx   Cefepime since 5/2   Vancomycin 5/2-5/5  Levofloxacin  5/4-5/5     Lines:       Assessment :  89 y.o. female with a PMHx of hypertension, hyperlipidemia, and SVT, and chronic left hip pain who presented to the ED on 5/2/25 with worsening low back pain.       Clinical presentation consistent with sepsis (present on admission-as evidenced by leukocytosis, tachypnea, tachycardia, altered mentation) due to Pseudomonas, Streptococcus anginosus bacteremia- positive blood culture 5/2, 5/3, negative blood culture 5/7; lumbar spine discitis/osteomyelitis, right psoas/iliacus muscle abscess      Streptococcus anginosus, Pseudomonas bacteremia could be due to respiratory tract illness couple weeks ago with subsequent seeding of the lumbar spine    Worsening mental status-likely metabolic encephalopathy secondary to sepsis versus medication (gabapentin induced).      Acute kidney injury-likely due to sepsis    Persistent leukocytosis-likely due to undrained  lumbar spine prevertebral/right iliopsoas abscess (as seen on MRI lumbar spine 5/8)  [facility] spine surgery declined transfer.  Recommendations for IR guided drainage noted.    Persistent low back pain noted on today's exam.  Some improvement in leukocytosis.    Recommendations:    continue cefepime.    Await IR guided drainage of psoas abscess.  Please send fluid for cultures.  Monitor clinically    Above plan was discussed in details with dr. Knight.Please call me if any further questions or concerns. Will continue to participate in the care of this patient.  HPI:    patient  did not answer questions asked. Moaning in pain Detailed review of systems not feasible    Past Medical History:   Diagnosis Date    Benign hypertensive heart disease without heart failure     Bradycardia     with verapamil    History of echocardiogram 02/13/2007    [facility]: 
Infectious Disease progress Note        Reason:pseudomonas, streptococcus anginosus bacteremia    Current abx Prior abx   Cefepime since 5/2   Vancomycin 5/2-5/5  Levofloxacin  5/4-5/5     Lines:       Assessment :  89 y.o. female with a PMHx of hypertension, hyperlipidemia, and SVT, and chronic left hip pain who presented to the ED on 5/2/25 with worsening low back pain.       Clinical presentation consistent with sepsis (present on admission-as evidenced by leukocytosis, tachypnea, tachycardia, altered mentation) due to Pseudomonas, Streptococcus anginosus bacteremia- positive blood culture 5/2, 5/3, negative blood culture 5/7; lumbar spine discitis/osteomyelitis, right psoas/iliacus muscle abscess      Streptococcus anginosus, Pseudomonas bacteremia could be due to respiratory tract illness couple weeks ago with subsequent seeding of the lumbar spine    Worsening mental status-likely metabolic encephalopathy secondary to sepsis versus medication (gabapentin induced).      Acute kidney injury-likely due to sepsis    Persistent leukocytosis-likely due to undrained  lumbar spine prevertebral/right iliopsoas abscess (as seen on MRI lumbar spine 5/8)  Ongoing discussions with [facility] spine surgery about transfer    Improved mentation noted today    Recommendations:    continue cefepime.    Follow-up repeat blood culture 5/7 to determine clearance of bacteremia  Agree with ongoing attempts transfer patient to tertiary care center for spine surgery evaluation since family wishes to continue aggressive measures. D/w dr. Knight. He is in touch with [facility] spine surgeon who has recommended MRI cervical/thoracic/lumbar spine.   Monitor clinically    Above plan was discussed in details with dr. Knight.Please call me if any further questions or concerns. Will continue to participate in the care of this patient.  HPI:    patient  did not answer questions asked.  Detailed review of systems not feasible    Past Medical 
Infectious Disease progress Note        Reason:pseudomonas, streptococcus anginosus bacteremia    Current abx Prior abx   Cefepime since 5/2   Vancomycin 5/2-5/5  Levofloxacin  5/4-5/5     Lines:       Assessment :  89 y.o. female with a PMHx of hypertension, hyperlipidemia, and SVT, and chronic left hip pain who presented to the ED on 5/2/25 with worsening low back pain.       Clinical presentation consistent with sepsis (present on admission-as evidenced by leukocytosis, tachypnea, tachycardia, altered mentation) due to Pseudomonas, Streptococcus anginosus bacteremia- positive blood culture 5/2, 5/3; lumbar spine discitis/osteomyelitis, right psoas/iliacus muscle abscess      Streptococcus anginosus, Pseudomonas bacteremia could be due to respiratory tract illness couple weeks ago with subsequent seeding of the lumbar spine    Worsening mental status-likely metabolic encephalopathy secondary to sepsis versus medication induced.  No improvement noted on today's exam-     Acute kidney injury-likely due to sepsis    Improved leukocytosis-  Menta status remains altered.    Recommendations:    continue cefepime.    Repeat blood culture to determine clearance of bacteremia  Agree with ongoing attempts transfer patient to tertiary [facility] center for spine surgery evaluation isince family wishes to continue aggressive measures. D/w dr. Knight. He is in touch with [place] spine surgeon who has recommended MRI cervical/thoracic/lumbar spine.   D/c gabapentin since it can contribute to drowsiness    Above plan was discussed in details with dr. Knight.Please call me if any further questions or concerns. Will continue to participate in the care of this patient.  HPI:    patient is very lethargic at the time of my evaluation and did not answer questions asked.  Detailed review of systems not feasible    Past Medical History:   Diagnosis Date    Benign hypertensive heart disease without heart failure     Bradycardia     with 
Informational endocrinology progress note    Chart reviewed.  Free T4 is slightly low at 0.8 today.  FSH/LH 14.2/10.0, prolactin 79.4, IGF-I pending.     Latest Reference Range & Units 05/02/25 07:27 05/02/25 08:30 05/03/25 04:46 05/04/25 00:30   Follicle Stimulating Hormone mIU/mL   14.2    LH mIU/mL   10.0    Prolactin ng/mL   79.4    Thyroxine (T4) 4.8 - 13.9 ug/dL 5.3      TSH, 3rd Generation 0.27 - 4.20 uIU/mL  0.029 (L)     T4 Free 0.9 - 1.7 NG/DL  <0.2 (L) 0.9 0.8 (L)     Impression/recommendations  This is a 89-year-old woman admitted with acute on chronic back pain, found to have VIV with concerns for UTI. During workup, patient was found to have low, but not suppressed TSH with an undetectable free T4. Patient has a documentation of hypothyroidism and taking levothyroxine 50 mcg p.o. daily. This could be related to a nonthyroidal illness (sick euthyroid syndrome) versus a central hypothyroidism or noncompliance with her outpatient levothyroxine (however TSH is discordant with her low free T4 if this were to be the case). Patient had some labs drawn as part of a pituitary workup; however, cortisol and ACTH were not ordered and a.m. cortisol levels can help to distinguish if there is a concern for a pituitary issue versus a sick euthyroid syndrome.  FSH/LH are appropriate for a postmenopausal woman; however, prolactin is elevated and this can be concerning for a pituitary issue.  ACTH/and cortisol were drawn this morning, which can help with determining if there is a central adrenal insufficiency as well as a central hypothyroidism.  If there are 3 pituitary axes that are affected, then this can determine a diagnosis of panhypopituitarism.    Next step, would be to obtain MRI with pituitary protocol to see if there is any mass or lesion in the pituitary that could be the etiology for her central pituitary issues.  Generally this is to see if surgery would be appropriate since that would be first-line 
Informational endocrinology progress note    Chart reviewed.  Free T4 stable and in reference range on p.o. levothyroxine.  IGF-I is low for age, prolactin is slightly elevated, FSH/LH and ACTH/cortisol appropriate for age and condition.  TPO antibodies negative.     Latest Reference Range & Units 05/03/25 04:46 05/04/25 07:39 05/05/25 09:42   Cortisol - AM 4.30 - 22.45 ug/dL  28.2 (H) 34.9 (H)   ACTH 7.2 - 63.3 pg/mL  15.8 17.6   Follicle Stimulating Hormone mIU/mL 14.2     LH mIU/mL 10.0     Prolactin ng/mL 79.4        Latest Reference Range & Units 05/03/25 04:46   IGF-1 (INSULIN-LIKE GROWTH I) Not Estab. ng/mL 50      Latest Reference Range & Units 05/02/25 07:27 05/02/25 08:30 05/03/25 04:46 05/04/25 00:30 05/05/25 04:47   T3, Total 71 - 180 ng/dL 51 (L)       Thyroxine (T4) 4.8 - 13.9 ug/dL 5.3       TSH, 3rd Generation 0.27 - 4.20 uIU/mL  0.029 (L)      Thyroid Peroxidase (TPO) Abs 0 - 34 IU/mL   12     T4 Free 0.9 - 1.7 NG/DL  <0.2 (L) 0.9 0.8 (L) 1.0      Latest Reference Range & Units 05/03/25 04:46   Thyroid Peroxidase (TPO) Abs 0 - 34 IU/mL 12     Impression/recommendations  This is a 89-year-old woman admitted with acute on chronic back pain, found to have VIV with concerns for UTI. During workup, patient was found to have low, but not suppressed TSH with an undetectable free T4. Patient has a documentation of hypothyroidism and taking levothyroxine 50 mcg p.o. daily. This could be related to a nonthyroidal illness (sick euthyroid syndrome) versus a central hypothyroidism or noncompliance with her outpatient levothyroxine (however TSH is discordant with her low free T4 if this were to be the case). 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  ACTH/a.m. cortisol are appropriate given that patient is critically ill and she is having an appropriate cortisol response, so 
Interim events noted.  Patient transition to comfort measures only.  Will sign off.  Discussed with family at bedside.  Thanks  
Kramer catheter inserted per order. Patient instructed to not pull at lines. Patient nodded her head \"yes\" in response. Patient is oriented to self and place and follows simple commands.  at bedside. Patient is calm, not showing any indication of restlessness or line pulling. Will continue to assess need for more restrictive measures if needed. Plan of care ongoing.   
MD made aware that pt's urine output since 8 am is only 100 ml. He said he increased the IV fluid rate to 75 ml and to monitor output. He further said to keep the floyd in place for now due to retention.  
MRI screening form needs to be filled out and faxed to [phone] BEFORE MRI can be scheduled.  If unable to obtain information from patient , MPOA needs to be contacted . If patient is claustrophobic or will needs pain meds, please have ordered in advance in order to facilitate exam.   
NUTRITION FOLLOW UP NOTE    Admitted for sepsis, suspected UTI, VIV. S/p right iliopsoas fluid collection 5/9. Per SLP last eval 5/9 rec'd puree with mildly (nectar) thick liquids. Pt NPO 5/9 for procedure; diet advanced same day to dysphagia diet. Average po intake during admission 50% including NPO days. Plan to order ONS to increase opportunity for protein-energy intake. Will continue to monitor per protocol.      Current Nutrition Therapies:  Average Meal Intake: 51-75%  Average Supplements Intake: None Ordered  ADULT DIET; Dysphagia - Pureed; Mildly Thick (Nectar)  ADULT ORAL NUTRITION SUPPLEMENT; Lunch, Dinner; Frozen Oral Supplement   Meal Intake: Provides on average ~66% kcal, 75% protein of estimated needs  Patient Vitals for the past 168 hrs:   PO Meals Eaten (%)   05/09/25 1400 0%   05/09/25 1148 0%   05/09/25 0930 76 - 100%   05/08/25 1652 76 - 100%   05/08/25 1233 76 - 100%   05/08/25 0840 76 - 100%   05/04/25 1418 0%   05/04/25 0955 0%       Nutrition Related Findings:   Pertinent Meds:   Synthroid  Flomax  LR @ 25 mL/hr Pertinent Labs: K 3.0 L, GFR 56  Recent Labs     05/08/25  0223 05/09/25  0329 05/10/25  0147   GLUCOSE 122* 157* 136*   BUN 74* 62* 49*   CREATININE 1.70* 1.18 0.94    142 142   K 3.5 3.3* 3.0*    106 106   CO2 22 24 23   CALCIUM 8.9 8.7 8.9   PHOS 3.8 3.1  --      No results for input(s): \"POCGLU\" in the last 72 hours.    Last BM: 05/10/25    Skin: Wound Type: Deep Tissue Injury     Edema: Generalized; BLE +1      Estimated Nutrition Needs: 66.7 kg   Energy Requirements Based On: Kcal/kg  Weight Used for Energy Requirements: Admission  Energy (kcal/day): 4083-4730 (25-30 kcal/kg)  Weight Used for Protein Requirements: Current  Protein (g/day): 71-85 g (1-1.2 g/kg)  Method Used for Fluid Requirements: 1 ml/kcal  Fluid (ml/day): 7934-2310 mL or per MD    Nutrition Recommendations/Plan:   Continue current diet as tolerated.  Order Magic Cup (each provides 290 kcal, 9g 
Occupational Therapy    Orders received, chart reviewed. OT attempts evaluation for third day in a row. Pt opens eyes to tactile and verbal stimuli, but is nonverbal and only moans in response to Ot's questions and cries out when OT attempts UE ROM. Pt unable to actively participate in meaningful OT evaluation at this time. OT to sign off at this time. Please re-consult if pt status changes.     Thank you for this referral.   Marcial Greenwood MS, OTR/L  
Occupational Therapy  OT orders received and chart reviewed. OT evaluation attempted however, pt unable to open eyes and made moaning sounds when asked a question. Pt's R hand pushed up against the bed rail and pt yelled out when OT touched her. Unable to participate in OT eval at this time. Will follow up as schedule allows.     Thank you,   Marcela Harris OTD, OTR/L  
Occupational Therapy  Unable to wake sufficiently for OT evaluation. Will follow up as appropriate for this patient. She Black, OTR/L  
Patient bladder scanned 547mL retaining. Straight cath completed with 500mL drained. Post cath bladder scan resulted 0mL. MD notified. Plan of care ongoing.   
Patient had not voided on this shift by midnight and was bladder scanned and was retaining 95 mls. At 0337 she still hadn't voided. She was bladder scanned and is now retaining 557 mls. Unable to straight cath x 2 and another nurse tried and was unsuccessful due to her anatomy. On call doctor notified.  
Patient rested without screaming and restlessness with family member at bedside. Morphine and ativan administered earlier in the shift with patient quiet and resting  throughout entire shift.  
Patient very lethargic today. PT unable to work with patient due to patient sleeping. Patient aroused to stimuli and repositioning.  Provider is aware, medication changed.   
Physical Therapy    PHYSICAL THERAPY TREATMENT/DISCHARGE    Patient: Yee Alegria (89 y.o. 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 Pt unable to open eyes. Primarily moans. Speaks unintelligibly intermittently. Pt requires total A for rolling, scooting at bed level. Does not follow commands. Pt found lying on L side scrunched down in bed. Pt positioned to midline and wedged on the L for opposite side pressure relief to maintain joint integrity. Pt donned with prevalon boots in order to prevent skin break down on heel as pt has heel wound present. Pt is not appropriate for PT at this time, unable to skillfully participate. Will sign off.        PLAN:  Maximum therapeutic gains met at current level of care and patient will be discharged from physical therapy at this time.  Rationale for discharge:  []     Goals Achieved  []     Plateau Reached  [x]     Patient not able to participate in therapy  []     Other:    Further Equipment Recommendations for Discharge: hospital bed and wheelchair 20 inch    [facility]: AM-PAC Inpatient Mobility Raw Score : 6      At this time and based on an AM-PAC score, no further PT is recommended upon discharge.  Recommend patient returns to prior setting with prior services.    This [facility] score should be considered in conjunction with interdisciplinary team recommendations to determine the most appropriate discharge setting. 
Physical Therapy  Attempted to see pt for PT treatment at 1036 AM, unable to awaken even with noxious stimuli.  Will continue to follow pt and see as appropriate.     
REINALDO drain was not flushed as ordered because it is not  connected to a 3-way stopcock. MD made aware.  
[facility] Renal Dosing Services    Pharmacist Renal Dosing Note for Cefepime    Previous Regimen 1 gm q12h   Serum Creatinine No results found for: \"HELEN\", \"CREAPOC\"   Creatinine Clearance Estimated Creatinine Clearance: 34 mL/min (based on SCr of 0.94 mg/dL).   BUN Lab Results   Component Value Date/Time    BUN 49 05/10/2025 01:47 AM           The following medication: Cefepime was automatically dose-adjusted per [facility] P&T Committee Protocol, with respect to renal function.      Dosage changed to:  2 gm q12h    Additional notes:    Pharmacy to continue to monitor patient daily.   Will make dosage adjustments based upon changing renal function.  Signed Jan Rasmussen RPH.    
Speech Pathology    Pt currently off the the unit. Will follow up for dysphagia management per plan of care.     Katia Boyle M.S. CCC-SLP  Speech Language Pathologist    
Spiritual Health History and Assessment/Progress Note  [facility]    Initial Encounter,  ,  ,      Name: Yee Alegria MRN: [id number]    Age: 89 y.o.     Sex: female   Language: English   Hoahaoism: Uatsdin   Sepsis (HCC)     Date: 5/3/2025            Total Time Calculated: 3 min              Spiritual Assessment began in [address] MEDICAL        Referral/Consult From: Rounding   Encounter Overview/Reason: Initial Encounter  Service Provided For: Patient    Rekha, Belief, Meaning:   Patient unable to assess at this time  Family/Friends No family/friends present      Importance and Influence:  Patient unable to assess at this time  Family/Friends No family/friends present    Community:  Patient Other: unknown  Family/Friends No family/friends present    Assessment and Plan of Care:     Patient Interventions include: Provided sacramental/Scientology ritual  Family/Friends Interventions include: No family/friends present    Patient Plan of Care: Spiritual Care available upon further referral  Family/Friends Plan of Care: No family/friends present    Electronically signed by Chaplain Yanet on 5/3/2025 at 11:21 AM   
TRANSFER - OUT REPORT:    Verbal report given to Ann DEJESUS on Yee Alegria  being transferred to  for routine post-op       Report consisted of patient's Situation, Background, Assessment and   Recommendations(SBAR).     Information from the following report(s) Nurse Handoff Report was reviewed with the receiving nurse.           Lines:   Peripheral IV 05/08/25 Left;Posterior Hand (Active)   Site Assessment Clean, dry & intact 05/09/25 1556   Line Status Flushed 05/09/25 1556   Line Care Connections checked and tightened 05/09/25 1556   Phlebitis Assessment No symptoms 05/09/25 1556   Infiltration Assessment 0 05/09/25 1556   Alcohol Cap Used Yes 05/09/25 1152   Dressing Status Clean, dry & intact 05/09/25 1556   Dressing Type Transparent 05/09/25 1556   Dressing Intervention New 05/08/25 2000        Opportunity for questions and clarification was provided.      Patient transported with:  Tech       
[facility] Renal Dosing Services    Pharmacist Renal Dosing Note for Cefepime    Previous Regimen 1 gm q24h   Serum Creatinine No results found for: \"HELEN\", \"CREAPOC\"   Creatinine Clearance Estimated Creatinine Clearance: 12 mL/min (A) (based on SCr of 2.61 mg/dL (H)).   BUN Lab Results   Component Value Date/Time    BUN 83 05/06/2025 02:06 AM           The following medication: Cefepime was automatically dose-adjusted per [facility] P&T Committee Protocol, with respect to renal function.      Dosage changed to:  1 gm q12h    Additional notes:    Pharmacy to continue to monitor patient daily.   Will make dosage adjustments based upon changing renal function.  Signed Jan Rasmussen RPH.    
[facility]   Pharmacy Pharmacokinetic Monitoring Service - Vancomycin    Indication: Bone and Joint Infection, Skin and Soft Tissue Infection  Target Concentration: Dosing based on anticipated concentration <15 mg/L due to renal impairment/insufficiency  Day of Therapy: 2 (restarting, received one dose on 5/2/25)  Additional Antimicrobials: Cefepime, Levaquin    Pertinent Laboratory Values:   Temp: 97.7 °F (36.5 °C), Weight - Scale: 71.2 kg (156 lb 15.5 oz)  Recent Labs     05/03/25  0446 05/04/25  0030 05/05/25  0447   CREATININE 3.19* 3.54* 2.97*   BUN 72* 84* 84*   WBC 33.8* 25.0* 26.3*   PROCAL 40.10  --   --        Estimated Creatinine Clearance: 11 mL/min (A) (based on SCr of 2.97 mg/dL (H)).    Pertinent Cultures:  Culture Date Source Results   5/2 Blood  Possible Pseudomonas, Streptococcus spp.   MRSA Nasal Swab: N/A. Non-respiratory infection    Assessment:  Date/Time Current Dose Concentration Timing of Concentration (h)   5/2 1500 mg x1 - -   5/3 - 15.7 17.5   5/4 750 mg x1 - -   5/5 750 mg x1 15.2 8   Note: Serum concentrations collected for AUC dosing may appear elevated if collected in close proximity to the dose administered, this is not necessarily an indication of toxicity    Plan:  Concentration-guided dosing due to renal impairment  Vancomycin 750 mg x 1 today  (patient may be a candidate for 750 mg IV Q48hr, however renal fx sill changing daily)  Renal labs as indicated   Vancomycin concentration ordered for AM labs tomorrow  Pharmacy will continue to monitor patient and adjust therapy as indicated    Thank you for the consult,  PATSY BERGER [facility]  5/5/2025      
[facility] Hospitalist Group  Progress Note    Patient: Yee Alegria Age: 89 y.o. : 1928 MR#: [id number] SSN: [id number]  Date/Time: 2025     Subjective:   Patient seen and examined bedside.  Patient appears very comfortable.  Family pleased that patient is not in any pain any longer.  Patient continues on comfort care and hospice and palliative care and  will see patient tomorrow.      Disposition likely hospice  Discharge 2025    Review of systems  General: No fevers or chills.  Cardiovascular: No chest pain or pressure. No palpitations.   Pulmonary: No shortness of breath, cough or wheeze.   Gastrointestinal: No abdominal pain, nausea, vomiting or diarrhea.   Genitourinary: No urinary frequency, urgency, hesitancy or dysuria.   Musculoskeletal: No joint or muscle pain, no back pain, no recent trauma.    Neurologic: No headache, numbness, tingling or weakness.   Assessment/Plan:   Vertebral osteomyelitis/discitis  MRI of the lumbar spine reported findings concerning for L5-S1 osteomyelitis/discitis.  Continue cefepime and vancomycin  There is no spine surgery coverage at [facility] until May 9, 2025.  Moreover, patient is likely not a candidate for spine surgery.  Her prognosis is poor.   - Reviewed MRI C-spine, T-spine, L-spine.  Incidental C-spine lesion.  MRI lumbar spine shows slightly increasing psoas abscess.  Transfer denied by [facility] and [facility].  - IR placed a drain on 2025, follow culture results  -Significantly escalating pain medicine today given patient's large amount of uncontrolled pain.  Poor prognosis overall  -Patient made comfort care  -Comfort care orders placed and hospice consult made     2.  Right psoas and iliacus abscesses  MRI of the spine reported 2 cm intramuscular collections within the right psoas and right iliacus muscles which could represent abscesses or intramuscular hemorrhage.  Discontinue 
[facility] Hospitalist Group  Progress Note    Patient: Yee Alegria Age: 89 y.o. : 1928 MR#: [id number] SSN: [id number]  Date/Time: 5/10/2025     Subjective:   Patient in significant pain this morning.  Diffuse whole body pain.  Will escalate pain medicine and have goals of care discussions with family    Update 1:30 pm: Family agreeable to hospice. Spoke to SALVADOR alegria and Tawny alegria who both confirmed hospice/comfort care.    Disposition likely hospice  Discharge 2025    Review of systems  General: No fevers or chills.  Cardiovascular: No chest pain or pressure. No palpitations.   Pulmonary: No shortness of breath, cough or wheeze.   Gastrointestinal: No abdominal pain, nausea, vomiting or diarrhea.   Genitourinary: No urinary frequency, urgency, hesitancy or dysuria.   Musculoskeletal: No joint or muscle pain, no back pain, no recent trauma.    Neurologic: No headache, numbness, tingling or weakness.   Assessment/Plan:   Vertebral osteomyelitis/discitis  MRI of the lumbar spine reported findings concerning for L5-S1 osteomyelitis/discitis.  Continue cefepime and vancomycin  There is no spine surgery coverage at [facility] until May 9, 2025.  Moreover, patient is likely not a candidate for spine surgery.  Her prognosis is poor.   - Reviewed MRI C-spine, T-spine, L-spine.  Incidental C-spine lesion.  MRI lumbar spine shows slightly increasing psoas abscess.  Transfer denied by [facility] [facility].  - IR placed a drain on 2025, follow culture results  -Significantly escalating pain medicine today given patient's large amount of uncontrolled pain.  Poor prognosis overall  -Patient made comfort care  -Comfort care orders placed and hospice consult made     2.  Right psoas and iliacus abscesses  MRI of the spine reported 2 cm intramuscular collections within the right psoas and right iliacus muscles which could represent abscesses or intramuscular 
[facility] Hospitalist Group  Progress Note    Patient: Yee Alegria Age: 89 y.o. : 1928 MR#: [id number] SSN: [id number]  Date/Time: 2025     Subjective:   Patient is still in a lot of pain from osteo pain meds adjusted as per palliative care started on fentanyl patch;    Final discharge would be hospice await  to figure out the disposition with the family      Assessment/Plan: Patient has been transition to hospice   Vertebral osteomyelitis/discitis  MRI of the lumbar spine reported findings concerning for L5-S1 osteomyelitis/discitis.  Continue cefepime and vancomycin  There is no spine surgery coverage at [facility] until May 9, 2025.  Moreover, patient is likely not a candidate for spine surgery.  Her prognosis is poor.   - Reviewed MRI C-spine, T-spine, L-spine.  Incidental C-spine lesion.  MRI lumbar spine shows slightly increasing psoas abscess.  Transfer denied by [facility] and [facility].  - IR placed a drain on 2025, follow culture results  -Significantly escalating pain medicine today given patient's large amount of uncontrolled pain.  Poor prognosis overall  -Patient made comfort care  -Comfort care orders placed and hospice consult made     2.  Right psoas and iliacus abscesses  MRI of the spine reported 2 cm intramuscular collections within the right psoas and right iliacus muscles which could represent abscesses or intramuscular hemorrhage.  Discontinue antibiotics due to Comfort Care status     3. Polymicrobial bacteremia  Blood cultures are growing Streptococcus and Pseudomonas.  Repeat blood cultures from 8/3/2025 are negative to date  - stop antibiotics given comfort care     4.  Acute kidney injury  Per her son, the patient does not have a history of prior kidney disease.  If this is truly acute kidney injury, it is likely prerenal and secondary to hypovolemia and underlying infection.      CT angiogram of the abdomen 
[facility] Hospitalist Group  Progress Note    Patient: Yee Alegria Age: 89 y.o. : 1928 MR#: [id number] SSN: [id number]  Date/Time: 2025     Subjective:   Patient more awake today. Intermittently complains of pain. MRI with contrast requested by [facility] spine surgery.    Dispo: Hospice vs transfer  DC 25    Review of systems  General: No fevers or chills.  Cardiovascular: No chest pain or pressure. No palpitations.   Pulmonary: No shortness of breath, cough or wheeze.   Gastrointestinal: No abdominal pain, nausea, vomiting or diarrhea.   Genitourinary: No urinary frequency, urgency, hesitancy or dysuria.   Musculoskeletal: No joint or muscle pain, no back pain, no recent trauma.    Neurologic: No headache, numbness, tingling or weakness.   Assessment/Plan:   Vertebral osteomyelitis/discitis  MRI of the lumbar spine reported findings concerning for L5-S1 osteomyelitis/discitis.  Continue cefepime and vancomycin  There is no spine surgery coverage at [facility] until May 9, 2025.  Moreover, patient is likely not a candidate for spine surgery.  Her prognosis is poor.   -transfer in progress, will reach out to [facility]  - Per spine surgery at [facility] they need MRI C,T,L spine with contrast.  Palliative care input appreciated  Continue supportive care with multimodal pain control.  Patient has been started on gabapentin and topical analgesics.     2.  Right psoas and iliacus abscesses  MRI of the spine reported 2 cm intramuscular collections within the right psoas and right iliacus muscles which could represent abscesses or intramuscular hemorrhage.  Continue patient on IV antibiotics  IV heparin has been placed on hold     3. Polymicrobial bacteremia  Blood cultures are growing Streptococcus and Pseudomonas.  Repeat blood cultures from 8/3/2025 are negative to date  Continue cefepime and levofloxacin  ID input appreciated-discussed with Dr. Lyon   
[facility] Hospitalist Group  Progress Note    Patient: Yee Alegria Age: 89 y.o. : 1928 MR#: [id number] SSN: [id number]  Date/Time: 2025     Subjective:   Patient seen and examined bedside.  Transferred to [facility] in [place] declined.  Will proceed with placing a drain into psoas abscess via interventional radiology.  Patient more awake and alert and is denying pain today.    Disposition Home health versus SNF  Discharge 2025    Review of systems  General: No fevers or chills.  Cardiovascular: No chest pain or pressure. No palpitations.   Pulmonary: No shortness of breath, cough or wheeze.   Gastrointestinal: No abdominal pain, nausea, vomiting or diarrhea.   Genitourinary: No urinary frequency, urgency, hesitancy or dysuria.   Musculoskeletal: No joint or muscle pain, no back pain, no recent trauma.    Neurologic: No headache, numbness, tingling or weakness.   Assessment/Plan:   Vertebral osteomyelitis/discitis  MRI of the lumbar spine reported findings concerning for L5-S1 osteomyelitis/discitis.  Continue cefepime and vancomycin  There is no spine surgery coverage at [facility] until May 9, 2025.  Moreover, patient is likely not a candidate for spine surgery.  Her prognosis is poor.   - Reviewed MRI C-spine, T-spine, L-spine.  Incidental C-spine lesion.  MRI lumbar spine shows slightly increasing psoas abscess.  Transfer denied by [facility] and [facility].  -IR consulted to place drain in abscess, culture order placed as well  Palliative care input appreciated  Continue supportive care with multimodal pain control.       2.  Right psoas and iliacus abscesses  MRI of the spine reported 2 cm intramuscular collections within the right psoas and right iliacus muscles which could represent abscesses or intramuscular hemorrhage.  Continue patient on IV antibiotics  IV heparin has been placed on hold     3. Polymicrobial bacteremia  Blood 
[facility] Hospitalist Group  Progress Note    Patient: Yee Alegria Age: 89 y.o. : 1928 MR#: [id number] SSN: [id number]  Date/Time: 2025     Subjective:   Patient difficult to arouse this AM. Overall poor prognosis. Per palliative care team family wanting transfer, unable to reach them via phone. Will reach out to transfer facilities.    Update. [facility] declined transfer.    Dispo; transfer vs hospice  DC 25    Review of systems  General: No fevers or chills.  Cardiovascular: No chest pain or pressure. No palpitations.   Pulmonary: No shortness of breath, cough or wheeze.   Gastrointestinal: No abdominal pain, nausea, vomiting or diarrhea.   Genitourinary: No urinary frequency, urgency, hesitancy or dysuria.   Musculoskeletal: No joint or muscle pain, no back pain, no recent trauma.    Neurologic: No headache, numbness, tingling or weakness.   Assessment/Plan:   Vertebral osteomyelitis/discitis  MRI of the lumbar spine reported findings concerning for L5-S1 osteomyelitis/discitis.  Continue cefepime and vancomycin  There is no spine surgery coverage at [facility] until May 9, 2025.  Moreover, patient is likely not a candidate for spine surgery.  Her prognosis is poor. The patient's CODE STATUS has been changed to DNR/DNI.  For now, continue current medical care.  If the patient's family decides to transition the patient to hospice, anticipate discharge to hospice today or tomorrow.  If the family decides to pursue aggressive care, the patient may have to be transferred to another acute care facility where spine surgery services are available.  Another facility where spine surgery is available.  -transfer in progress, will reach out to [place] and [place]  Palliative [facility] input appreciated  Continue supportive care with multimodal pain control.  Patient has been started on gabapentin and topical analgesics.     2.  Right psoas and iliacus 
[facility]   Pharmacy Pharmacokinetic Monitoring Service - Vancomycin    Indication: Sepsis of Unknown Etiology  Target Concentration: Dosing based on anticipated concentration <15 mg/L due to renal impairment/insufficiency  Day of Therapy: 1  Additional Antimicrobials: Cefepime    Pertinent Laboratory Values:      Recent Labs     05/02/25  0727   CREATININE 3.08*   BUN 69*   WBC 28.9*       Estimated Creatinine Clearance: 10 mL/min (A) (based on SCr of 3.08 mg/dL (H)).    Pertinent Cultures:  Culture Date Source Results   5/2 blood pending   5/2 urine pending   MRSA Nasal Swab: N/A. Non-respiratory infection    Assessment:  Date/Time Current Dose Concentration Timing of Concentration (h) AUC   5/2 1,500mg - - -     Note: Serum concentrations collected for AUC dosing may appear elevated if collected in close proximity to the dose administered, this is not necessarily an indication of toxicity    Plan:  Concentration-guided dosing due to renal impairment  1,500 mg x 1 today  Renal labs as indicated   Vancomycin concentration ordered for AM labs tomorrow  Pharmacy will continue to monitor patient and adjust therapy as indicated    Thank you for the consult,  XAVIER IYER RPH  5/2/2025      
[facility]   Pharmacy Pharmacokinetic Monitoring Service - Vancomycin    Indication: Bone and Joint Infection, Skin and Soft Tissue Infection  Target Concentration: Dosing based on anticipated concentration <15 mg/L due to renal impairment/insufficiency  Day of Therapy: 1 (restarting, received one dose on 5/2/25)  Additional Antimicrobials: Cefepime, Levaquin    Pertinent Laboratory Values:   Temp: 98.9 °F (37.2 °C), Weight - Scale: 71.2 kg (156 lb 15.5 oz)  Recent Labs     05/02/25  0830 05/02/25  0850 05/03/25  0446 05/04/25  0030   CREATININE  --    < > 3.19* 3.54*   BUN  --    < > 72* 84*   WBC  --   --  33.8* 25.0*   PROCAL 1.78  --  40.10  --     < > = values in this interval not displayed.       Estimated Creatinine Clearance: 9 mL/min (A) (based on SCr of 3.54 mg/dL (H)).    Pertinent Cultures:  Culture Date Source Results   5/2 Blood  Possible Pseudomonas, Streptococcus spp.   MRSA Nasal Swab: N/A. Non-respiratory infection    Assessment:  Date/Time Current Dose Concentration Timing of Concentration (h)   5/2 1500 mg x1 - -   5/3 - 15.7 17.5   5/4 750 mg x1 - -   Note: Serum concentrations collected for AUC dosing may appear elevated if collected in close proximity to the dose administered, this is not necessarily an indication of toxicity    Plan:  Concentration-guided dosing due to renal impairment  Vancomycin 750 mg x 1 today  Renal labs as indicated   Vancomycin concentration ordered for AM labs tomorrow  Pharmacy will continue to monitor patient and adjust therapy as indicated    Thank you for the consult,  Vanna Samuels RPH  5/4/2025    
  HCT 25.9* 26.5* 24.1*     Chemistry:   Recent Labs     05/05/25  0447 05/06/25  0206 05/07/25  0148   BUN 84* 83* 83*   K 3.9 3.8 3.2*   * 136 138    101 101   CO2 15* 17* 22   PHOS 5.8* 5.0* 4.8            Procedures/imaging: see electronic medical records for all procedures, Xrays and details which were not copied into this note but were reviewed prior to creation of Plan          Assessment & Plan:   See above          SALMA ACEVEDO MD  5/7/2025  2:23 PM              
Primary Care  [] Hospice       ADVANCE CARE PLANNING:   [] [facility] Interdisciplinary Team has updated the ACP Navigator with Health Care Decision Maker and Patient Capacity      Primary Decision Maker: Jack Alegria - Child - [phone]  Confirm Advance Directive: None  Patient Would Like to Complete Advance Directive: No/refused    Current Code Status: DNR     Please refer to Palliative Medicine ACP notes for further details.    PALLIATIVE ASSESSMENT:      Palliative Performance Scale (PPS): 20  PPS: 20  Modified ESAS:  Modified-[place]ti Symptom Assessment Scale (ESAS)  Depression Score: Not depressed  Pain Score: Worst possible pain (screams with movement)  Anxiety Score: Not anxious  Dyspnea Score: No shortness of breath    Clinical Pain Assessment (nonverbal scale for severity on nonverbal patients):   Clinical Pain Assessment  Severity: 10       NVPS:  Adult Nonverbal Pain Scale (NVPS)  Face: Frequent grimace, tearing, frowning, wrinkled forehead (with movement)  Activity (Movement): Laid quietly, normal position  Guarding: Lying quietly, no positioning of hands over areas of bod  Physiology (Vital Signs): Stable vital signs  Respiratory: Baseline RR/SpO2 compliant with ventilator  NVPS Score : 2    Vital Signs: Blood pressure (!) 134/57, pulse 82, temperature 98.6 °F (37 °C), temperature source Axillary, resp. rate 18, height 1.702 m (5' 7\"), weight 70.8 kg (156 lb), SpO2 96%.    PHYSICAL ASSESSMENT:   General: [] Oriented x3  [] Well appearing  [] Intubated  []Ill appearing  [x]Other: eyes closed, screamed out in pain when legs moved  Mental Status: [] Normal mental status exam  [x] Drowsy  [x] Confused  []Other:  Cardiovascular: [x] Regular rate/rhythm  [] Arrhythmia  [] Other:  Chest: [x] Effort normal  []Lungs clear  [] Respiratory distress  []Tachypnea  [] Other:  Abdomen: [] Soft/non-tender  [x] Normal appearance  [] Distended  [] Ascites  [] Other:  Neurological: [] Normal speech  [] Normal 
tablet 81 mg  81 mg Oral Daily    lidocaine 4 % external patch 1 patch  1 patch Topical Daily    sertraline (ZOLOFT) tablet 50 mg  50 mg Oral Daily    sodium chloride flush 0.9 % injection 5-40 mL  5-40 mL IntraVENous 2 times per day    sodium chloride flush 0.9 % injection 5-40 mL  5-40 mL IntraVENous PRN    0.9 % sodium chloride infusion   IntraVENous PRN    ondansetron (ZOFRAN-ODT) disintegrating tablet 4 mg  4 mg Oral Q8H PRN    Or    ondansetron (ZOFRAN) injection 4 mg  4 mg IntraVENous Q6H PRN    melatonin tablet 3 mg  3 mg Oral Nightly PRN    polyethylene glycol (GLYCOLAX) packet 17 g  17 g Oral Daily PRN    acetaminophen (TYLENOL) tablet 650 mg  650 mg Oral Q6H PRN    Or    acetaminophen (TYLENOL) suppository 650 mg  650 mg Rectal Q6H PRN       Labs:    Recent Results (from the past 24 hours)   T4, Free    Collection Time: 05/09/25  3:29 AM   Result Value Ref Range    T4 Free 1.1 0.9 - 1.7 NG/DL   Phosphorus    Collection Time: 05/09/25  3:29 AM   Result Value Ref Range    Phosphorus 3.1 2.5 - 4.9 MG/DL   Comprehensive Metabolic Panel    Collection Time: 05/09/25  3:29 AM   Result Value Ref Range    Sodium 142 136 - 145 mmol/L    Potassium 3.3 (L) 3.5 - 5.5 mmol/L    Chloride 106 98 - 107 mmol/L    CO2 24 21 - 32 mmol/L    Anion Gap 13 3.0 - 18.0 mmol/L    Glucose 157 (H) 74 - 108 mg/dL    BUN 62 (H) 6 - 23 MG/DL    Creatinine 1.18 0.6 - 1.3 MG/DL    BUN/Creatinine Ratio 53 (H) 12 - 20      Est, Glom Filt Rate 42 (L) >60 ml/min/1.73m2    Calcium 8.7 8.5 - 10.1 MG/DL    Total Bilirubin 0.4 0.2 - 1.0 MG/DL    ALT 95 (H) 10 - 35 U/L     (H) 10 - 38 U/L    Alk Phosphatase 155 (H) 45 - 117 U/L    Total Protein 5.0 (L) 6.4 - 8.2 g/dL    Albumin 1.7 (L) 3.4 - 5.0 g/dL    Globulin 3.3 2.0 - 4.0 g/dL    Albumin/Globulin Ratio 0.5 (L) 0.8 - 1.7     CBC with Auto Differential    Collection Time: 05/09/25  3:29 AM   Result Value Ref Range    WBC 13.8 (H) 4.6 - 13.2 K/uL    RBC 2.47 (L) 4.20 - 5.30 M/uL    
detected        Strep pneumoniae Not detected        Strep pyogenes,(Grp. A) Not detected        Acinetobacter calcoac baumannii complex by PCR Not detected        Bacteroides fragilis by PCR Not detected        Enterobacteriaceae by PCR Not detected        Enterobacter cloacae complex by PCR Not detected        Escherichia Coli Not detected        Klebsiella aerogenes by PCR Not detected        Klebsiella oxytoca by PCR Not detected        Klebsiella pneumoniae group by PCR Not detected        Proteus by PCR Not detected        Salmonella species by PCR Not detected        Serratia marcescens by PCR Not detected        Haemophilus Influenzae by PCR Not detected        Neisseria meningitidis by PCR Not detected        Pseudomonas aeruginosa Detected        Stenotrophomonas maltophilia by PCR Not detected        Candida albicans by PCR Not detected        Candida auris by PCR Not detected        Candida glabrata Not detected        Candida krusei by PCR Not detected        Candida parapsilosis by PCR Not detected        Candida tropicalis by PCR Not detected        Cryptococcus neoformans/gattii by PCR Not detected        Resistant gene targets          Resistant gene ctx-m by PCR Not detected        Resistant gene imp by PCR Not detected        KPC (Carbapenem resistance gene) Not detected        Resistant gene ndm by PCR Not detected        Resistant gene vim by PCR Not detected        [employer] test comment       False positive results may rarely occur. Correlate with clinical,epidemiologic, and other laboratory findings           Comment: Please see BCID Interpretation Guide in EPIC Links                   RADIOLOGY:    All available imaging studies/reports in [facility] for this admission were reviewed    High complexity decision making was performed during the evaluation of this patient at high risk for decompensation      Above mentioned total time spent on reviewing the case/medical 
Dr. Lyon  Palliative care consulted to discuss goals of care    4.  Acute kidney injury  Versus stage IV chronic kidney disease; the patient's renal function at baseline is unknown.  Renal function has deteriorated furthe  CT angiogram of the abdomen and pelvis is negative for any hydronephrosis or renal stones  Monitor intake and output.  Minimize use of nephrotoxins.  Continue IV fluids  Nephrology consulted-await recommendations; patient is a poor candidate for dialysis       5.  Elevated troponin  Could be secondary to demand ischemia [type II NSTEMI]  Cardiology input appreciated    6.  Paroxysmal atrial fibrillation  Currently rate controlled; continue IV metoprolol  Heparin has been placed on hold as there is suspicion of intramuscular hemorrhage    7.  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  Check ACTH and cortisol levels  Continue patient on levothyroxine  Continue to monitor free T4 levels  Endocrinology input appreciated-discussed with Dr. Santacruz    8.  Lactic acidemia  Likely secondary to underlying infection  Continue IV fluids and recheck lactic acid levels    9.  Herpes zoster  Patient has a papular rash on the left side of the body that seems to have crusted over.  No fluid-filled vesicles were noted.  Continue supportive care for now    10.  Normocytic anemia  Could be secondary to acute blood loss and renal dysfunction  Continue to monitor hemoglobin and hematocrit.  Transfuse to a hemoglobin goal of 7.0.    Please contact Dr. Gilman if there are any questions. Greater than 50 minutes were spent reviewing the patient's chart, obtaining a thorough history, performing a physical exam, placing orders and dictating this document.  Findings and plan were also discussed with the patient/patient's family and with RN.  Greater than 50% of the time was spent on direct patient care.             Signed By: [unfilled]     May 4, 2025 2:50 PM

## 2025-05-13 NOTE — WOUND CARE
Room #: 455      ARMANDO: [id number]      Situation: Wound Care Consult    Background:    PMH:   Active Ambulatory Problems     Diagnosis Date Noted    Fatigue 11/06/2012    Palpitations     Hyperlipidemia 05/02/2011    Intolerance of drug 05/02/2011    Benign hypertensive heart disease without heart failure     Bradycardia      Resolved Ambulatory Problems     Diagnosis Date Noted    No Resolved Ambulatory Problems     Past Medical History:   Diagnosis Date    History of echocardiogram 02/13/2007    Holter monitor, abnormal 11/10/2016    Hypercholesteremia         Allen Score: 12/23   BMI: Body mass index is 24.43 kg/m².   Preventive measures in place: limited layers, glide sheet, heels floated on pillows, wedges to left side    Assessment:   Patient found  wedges to left side .  Patient is Oriented to person only and Sleepy. Screams in pain with movement, shouts \"STIFF\", but unable to elaborate further. Patient is [facility] with hospice today.      Wound(s) Description:           Wound 05/05/25 Heel Left DTI interior heel (Active)   Wound Image   05/13/25 1310   Wound Etiology Deep tissue/Injury 05/13/25 1310   Dressing Status Clean;Dry;Intact 05/12/25 2026   Wound Cleansed Soap and water 05/12/25 2026   Dressing/Treatment Open to air 05/13/25 0845   Offloading for Diabetic Foot Ulcers Offloading boot 05/13/25 0845   Wound Length (cm) 1 cm 05/13/25 1310   Wound Width (cm) 3 cm 05/13/25 1310   Wound Surface Area (cm^2) 3 cm^2 05/13/25 1310   Wound Assessment Ruptured blister 05/13/25 1310   Drainage Amount None (dry) 05/13/25 1310   Odor None 05/13/25 1310   Shara-wound Assessment Intact 05/13/25 1310   Number of days: 8       Wound 05/09/25 Sacrum Posterior Stage 2 (Active)   Wound Image   05/13/25 1309   Wound Etiology Pressure Stage 2 05/13/25 1309   Dressing Status Reinforced dressing 05/13/25 1309   Wound Cleansed Soap and water 05/12/25 2026   Dressing/Treatment Silicone border 05/13/25 0845

## 2025-05-13 NOTE — DISCHARGE SUMMARY
Hospitalist Discharge Summary     Patient ID:  Yee Alegria  [id number]  89 y.o.  8/22/1928    PCP on record: Adrianna Byrd MD    Admit date: 5/2/2025  Discharge date and time: 5/13/2025    Discharge Diagnoses:                                             Discharge with comfort measures/hospice care;    Vertebral osteomyelitis/discitis  MRI of the lumbar spine reported findings concerning for L5-S1 osteomyelitis/discitis.  Continue cefepime and vancomycin    There is no spine surgery coverage at [facility] until May 9, 2025.  Moreover, patient is likely not a candidate for spine surgery.  Her prognosis is poor.     - Reviewed MRI C-spine, T-spine, L-spine.  Incidental C-spine lesion.  MRI lumbar spine shows slightly increasing psoas abscess.  Transfer denied by [facility] and [facility].    - IR placed a drain on 5/9/2025;    -Significantly escalating pain medicine today given patient's large amount of uncontrolled pain.  Poor prognosis overall;    -Patient made comfort care  -Comfort care orders placed and hospice consult made     2.  Right psoas and iliacus abscesses  MRI of the spine reported 2 cm intramuscular collections within the right psoas and right iliacus muscles which could represent abscesses or intramuscular hemorrhage.  Discontinue antibiotics due to Comfort Care status     3. Polymicrobial bacteremia  Blood cultures are growing Streptococcus and Pseudomonas.  Repeat blood cultures from 8/3/2025 are negative to date  - stop antibiotics given comfort care     4.  Acute kidney injury  Per her son, the patient does not have a history of prior kidney disease.  If this is truly acute kidney injury, it is likely prerenal and secondary to hypovolemia and underlying infection.       CT angiogram of the abdomen and pelvis is negative for any hydronephrosis or renal stones  Monitor intake and output.  Minimize use of nephrotoxins.     Nephrology input appreciated-

## 2025-05-13 NOTE — CARE COORDINATION
Called [facility] spoke to Laura confirmed patient will be leaving at 1200 to [facility].     Renee EDWARDSN, RN   Case Management   [phone]    
Called Doris [phone] finance coordinator at [facility] who confirmed patient's son made payment and they can accept patient tomorrow.     Renee PAGE, RN   Case Management   [phone]    
Called [facility] outpatient pharmacy for hospice medications, Sangeetha stated they are not going through.     Called [facility] spoke to Annemarie who stated she will call the son and arrange for medications to be sent to another pharmacy the family is able to go to.     Renee EDWARDSN, RN   Case Management   [phone]    
Called [facility] [phone], spoke to Khanh who arranged transport to [facility] ( [address] [place] [address]) for tomorrow 5/13 at 1200. Trip number is [id number].     Renee EDWARDSN, RN   Case Management   [phone]    
Family has decided on [facility]. Called [facility] admissions liaison [phone], who stated she will call back with the contact information for the family to call and arrange payment.     Renee PAGE, RN   Case Management   [phone]    
Freedom of Choice  5/12/2025, 11:33 AM    Patient Name: Yee Alegria                   YOB: 1928    Patient's son and daughter in law offered star rated [place] of Choice for hospice.     They chose [facility].     Called hospice [phone], spoke to Laura who will reach out to the family.     Per family patient will need to go on hospice at a facility. Son is aware of cost and agreeable.     Renee EDWARDSN, RN   Case Management   [phone]    
Patient is unable to answer questions at this time. Called patient's son Jack [phone], no answer. Left HIPAA voicemail to return call. No other contacts on file.     Renee PAGE, RN   Case Management   [phone]    
Transition of Care Plan to SNF/Rehab      Patient Name: Yee Alegria                   YOB: 1928    SNF/Rehab Transition:  Patient has been accepted to [facility] SNF/Rehab and meets criteria for admission. Confirmed with Isabel that bed is available for today.   Patient will transported by medical transport and expected to leave at 1200.  Met with patient and patient's son  and they are agreeable to the transition plan.    Medicaid Long-term Services and Supports(LTSS) screening completion: NA    Three Inpatient Midnights for Medicare: NA    Current Code Status:   Code Status: DNR     Last Weight:   Wt Readings from Last 1 Encounters:   05/05/25 70.8 kg (156 lb)       Weightbearing Status:     IV Medication, IV Site, Device Type: No    Dialysis: No      O2 Needs (including O2, Bipap, Cpap, ect.): No    Covid Vaccine Dates/ if known:    Internal Administration   First Dose      Second Dose           Last COVID Lab SARS-CoV-2, PCR ( )   Date Value   05/02/2025 Not detected            Last COVID Lab SARS-CoV-2, PCR ( )   Date Value   05/02/2025 Not detected              Current Diet: ADULT DIET; Dysphagia - Pureed; Mildly Thick (Nectar)  ADULT ORAL NUTRITION SUPPLEMENT; Lunch, Dinner; Frozen Oral Supplement    Wound Vac or Other Equipment Needs:     Patient requires Isolation: No    Follow-up Appointment needed or scheduled: No    Communication to SNF/Rehab:  Bedside RNRachelle, has been notified to update the transition plan to the facility and call report (phone number).  Discharge information has been updated on the AVS and communicated to facility via CarePort.    Nursing Please include all hard scripts for controlled substances, med rec and dc summary, and AVS in packet.     Renee Heredia RN  Case Management Department  Ph: [phone]       
Admission None   Potential Assistance Needed Skilled Nursing Facility;Home Care   DME Ordered? No   Potential Assistance Purchasing Medications No   Type of Home Care Services OT;PT;Skilled Therapy;Nursing Services   Patient expects to be discharged to: Unknown   Follow Up Appointment: Best Day/Time  Monday AM   One/Two Story Residence One story   History of falls? 0   Services At/After Discharge   Transition of Care Consult (CM Consult) Home Health;SNF   Internal Home Health Yes   Partner SNF Yes   Services At/After Discharge Home Health;Skilled Nursing Facility (SNF)   [place] Resource Information Provided? No   Mode of Transport at Discharge Other (see comment)  (Family to transport)   Hospital Transport Time of Discharge   (to be determined on day of discharge)   Confirm Follow Up Transport Family   Condition of Participation: Discharge Planning   The Plan for Transition of Care is related to the following treatment goals: Disposition is home with home health versus skilled nursing facility   The Patient and/or Patient Representative was provided with a Choice of Provider? Patient Representative   Name of the Patient Representative who was provided with the Choice of Provider and agrees with the Discharge Plan?  Kandice toro   The Patient and/Or Patient Representative agree with the Discharge Plan? Yes   Freedom of Choice list was provided with basic dialogue that supports the patient's individualized plan of care/goals, treatment preferences, and shares the quality data associated with the providers?  Yes     Patient is unable to answer questions. Ryan toro at bedside who completed initial assessment and verified demographics. Patient's son Jack phone number updated.     Freedom of Choice  5/5/2025, 11:30 AM    Patient Name: Yee Alegria                   YOB: 1928    Patient's grandson offered star rated [place] of Choice for home health and skilled nursing facilities.